# Patient Record
Sex: FEMALE | Race: WHITE | Employment: OTHER | URBAN - METROPOLITAN AREA
[De-identification: names, ages, dates, MRNs, and addresses within clinical notes are randomized per-mention and may not be internally consistent; named-entity substitution may affect disease eponyms.]

---

## 2018-04-13 RX ORDER — ERGOCALCIFEROL (VITAMIN D2) 1250 MCG
50000 CAPSULE ORAL WEEKLY
Qty: 12 CAPSULE | Refills: 3 | Status: SHIPPED | OUTPATIENT
Start: 2018-04-13 | End: 2019-03-15 | Stop reason: SDUPTHER

## 2018-04-30 ENCOUNTER — TELEPHONE (OUTPATIENT)
Dept: ADMINISTRATIVE | Age: 68
End: 2018-04-30

## 2018-05-09 ENCOUNTER — OFFICE VISIT (OUTPATIENT)
Dept: INTERNAL MEDICINE CLINIC | Age: 68
End: 2018-05-09

## 2018-05-09 VITALS
SYSTOLIC BLOOD PRESSURE: 132 MMHG | DIASTOLIC BLOOD PRESSURE: 80 MMHG | RESPIRATION RATE: 14 BRPM | BODY MASS INDEX: 32.02 KG/M2 | HEIGHT: 65 IN | WEIGHT: 192.2 LBS | HEART RATE: 78 BPM

## 2018-05-09 DIAGNOSIS — G89.29 CHRONIC PAIN OF BOTH KNEES: ICD-10-CM

## 2018-05-09 DIAGNOSIS — E55.9 VITAMIN D DEFICIENCY: ICD-10-CM

## 2018-05-09 DIAGNOSIS — F32.A ANXIETY AND DEPRESSION: Primary | ICD-10-CM

## 2018-05-09 DIAGNOSIS — R73.03 PRE-DIABETES: ICD-10-CM

## 2018-05-09 DIAGNOSIS — F41.9 ANXIETY AND DEPRESSION: Primary | ICD-10-CM

## 2018-05-09 DIAGNOSIS — G47.33 OSA (OBSTRUCTIVE SLEEP APNEA): ICD-10-CM

## 2018-05-09 DIAGNOSIS — M25.562 CHRONIC PAIN OF BOTH KNEES: ICD-10-CM

## 2018-05-09 DIAGNOSIS — M79.10 MUSCLE PAIN: ICD-10-CM

## 2018-05-09 DIAGNOSIS — I10 ESSENTIAL HYPERTENSION: ICD-10-CM

## 2018-05-09 DIAGNOSIS — M25.561 CHRONIC PAIN OF BOTH KNEES: ICD-10-CM

## 2018-05-09 DIAGNOSIS — E78.5 HYPERLIPIDEMIA LDL GOAL <100: ICD-10-CM

## 2018-05-09 DIAGNOSIS — M51.36 DDD (DEGENERATIVE DISC DISEASE), LUMBAR: ICD-10-CM

## 2018-05-09 PROBLEM — M51.369 DDD (DEGENERATIVE DISC DISEASE), LUMBAR: Status: ACTIVE | Noted: 2018-05-09

## 2018-05-09 PROCEDURE — G8417 CALC BMI ABV UP PARAM F/U: HCPCS | Performed by: INTERNAL MEDICINE

## 2018-05-09 PROCEDURE — 1123F ACP DISCUSS/DSCN MKR DOCD: CPT | Performed by: INTERNAL MEDICINE

## 2018-05-09 PROCEDURE — 99215 OFFICE O/P EST HI 40 MIN: CPT | Performed by: INTERNAL MEDICINE

## 2018-05-09 PROCEDURE — 1090F PRES/ABSN URINE INCON ASSESS: CPT | Performed by: INTERNAL MEDICINE

## 2018-05-09 PROCEDURE — G8400 PT W/DXA NO RESULTS DOC: HCPCS | Performed by: INTERNAL MEDICINE

## 2018-05-09 PROCEDURE — 1036F TOBACCO NON-USER: CPT | Performed by: INTERNAL MEDICINE

## 2018-05-09 PROCEDURE — 4040F PNEUMOC VAC/ADMIN/RCVD: CPT | Performed by: INTERNAL MEDICINE

## 2018-05-09 PROCEDURE — G8428 CUR MEDS NOT DOCUMENT: HCPCS | Performed by: INTERNAL MEDICINE

## 2018-05-09 PROCEDURE — 3017F COLORECTAL CA SCREEN DOC REV: CPT | Performed by: INTERNAL MEDICINE

## 2018-05-09 RX ORDER — TELMISARTAN 80 MG/1
80 TABLET ORAL DAILY
COMMUNITY
End: 2018-08-15 | Stop reason: SDUPTHER

## 2018-05-09 RX ORDER — AMLODIPINE BESYLATE 10 MG/1
10 TABLET ORAL DAILY
Qty: 30 TABLET | Refills: 3 | Status: SHIPPED | OUTPATIENT
Start: 2018-05-09 | End: 2018-05-09 | Stop reason: SDUPTHER

## 2018-05-09 RX ORDER — AMLODIPINE BESYLATE 10 MG/1
10 TABLET ORAL DAILY
Qty: 90 TABLET | Refills: 0 | Status: SHIPPED | OUTPATIENT
Start: 2018-05-09 | End: 2018-12-10 | Stop reason: SDUPTHER

## 2018-05-09 RX ORDER — ESCITALOPRAM OXALATE 20 MG/1
20 TABLET ORAL DAILY
Qty: 90 TABLET | Refills: 2 | Status: SHIPPED | OUTPATIENT
Start: 2018-05-09 | End: 2018-12-04 | Stop reason: ALTCHOICE

## 2018-05-09 RX ORDER — AMLODIPINE BESYLATE AND ATORVASTATIN CALCIUM 10; 20 MG/1; MG/1
1 TABLET, FILM COATED ORAL DAILY
COMMUNITY
End: 2018-05-09 | Stop reason: ALTCHOICE

## 2018-05-09 RX ORDER — LANOLIN ALCOHOL/MO/W.PET/CERES
500 CREAM (GRAM) TOPICAL NIGHTLY
COMMUNITY

## 2018-05-09 RX ORDER — METHOCARBAMOL 750 MG/1
750 TABLET, FILM COATED ORAL 2 TIMES DAILY PRN
Qty: 30 TABLET | Refills: 0 | Status: SHIPPED | OUTPATIENT
Start: 2018-05-09 | End: 2018-05-19

## 2018-05-09 RX ORDER — NICOTINE POLACRILEX 4 MG/1
40 GUM, CHEWING ORAL DAILY
COMMUNITY
End: 2018-07-06 | Stop reason: SDUPTHER

## 2018-05-09 RX ORDER — ESCITALOPRAM OXALATE 20 MG/1
20 TABLET ORAL DAILY
COMMUNITY
End: 2018-05-09 | Stop reason: SDUPTHER

## 2018-05-09 ASSESSMENT — PATIENT HEALTH QUESTIONNAIRE - PHQ9
2. FEELING DOWN, DEPRESSED OR HOPELESS: 1
SUM OF ALL RESPONSES TO PHQ9 QUESTIONS 1 & 2: 2
SUM OF ALL RESPONSES TO PHQ QUESTIONS 1-9: 2
1. LITTLE INTEREST OR PLEASURE IN DOING THINGS: 1

## 2018-05-10 PROBLEM — M79.10 MUSCLE PAIN: Status: ACTIVE | Noted: 2018-05-10

## 2018-05-10 PROBLEM — G89.29 CHRONIC PAIN OF BOTH KNEES: Status: ACTIVE | Noted: 2018-05-10

## 2018-05-10 PROBLEM — M25.562 CHRONIC PAIN OF BOTH KNEES: Status: ACTIVE | Noted: 2018-05-10

## 2018-05-10 PROBLEM — M25.561 CHRONIC PAIN OF BOTH KNEES: Status: ACTIVE | Noted: 2018-05-10

## 2018-05-10 ASSESSMENT — ENCOUNTER SYMPTOMS
BACK PAIN: 1
CONSTIPATION: 0
CHEST TIGHTNESS: 0
DIARRHEA: 0
SHORTNESS OF BREATH: 0

## 2018-06-22 ENCOUNTER — TELEPHONE (OUTPATIENT)
Dept: RHEUMATOLOGY | Age: 68
End: 2018-06-22

## 2018-06-22 DIAGNOSIS — M25.561 CHRONIC PAIN OF RIGHT KNEE: Primary | ICD-10-CM

## 2018-06-22 DIAGNOSIS — G89.29 CHRONIC PAIN OF RIGHT KNEE: Primary | ICD-10-CM

## 2018-07-06 ENCOUNTER — OFFICE VISIT (OUTPATIENT)
Dept: INTERNAL MEDICINE CLINIC | Age: 68
End: 2018-07-06

## 2018-07-06 VITALS
HEART RATE: 68 BPM | BODY MASS INDEX: 32.38 KG/M2 | WEIGHT: 194.6 LBS | SYSTOLIC BLOOD PRESSURE: 134 MMHG | RESPIRATION RATE: 14 BRPM | DIASTOLIC BLOOD PRESSURE: 72 MMHG

## 2018-07-06 DIAGNOSIS — G47.33 OSA (OBSTRUCTIVE SLEEP APNEA): ICD-10-CM

## 2018-07-06 DIAGNOSIS — G89.29 CHRONIC PAIN OF BOTH KNEES: Primary | ICD-10-CM

## 2018-07-06 DIAGNOSIS — M25.561 CHRONIC PAIN OF BOTH KNEES: Primary | ICD-10-CM

## 2018-07-06 DIAGNOSIS — E78.5 HYPERLIPIDEMIA LDL GOAL <100: ICD-10-CM

## 2018-07-06 DIAGNOSIS — E55.9 VITAMIN D DEFICIENCY: ICD-10-CM

## 2018-07-06 DIAGNOSIS — R73.03 PRE-DIABETES: ICD-10-CM

## 2018-07-06 DIAGNOSIS — I10 ESSENTIAL HYPERTENSION: ICD-10-CM

## 2018-07-06 DIAGNOSIS — K21.9 GASTROESOPHAGEAL REFLUX DISEASE WITHOUT ESOPHAGITIS: ICD-10-CM

## 2018-07-06 DIAGNOSIS — M25.562 CHRONIC PAIN OF BOTH KNEES: Primary | ICD-10-CM

## 2018-07-06 PROCEDURE — 1036F TOBACCO NON-USER: CPT | Performed by: INTERNAL MEDICINE

## 2018-07-06 PROCEDURE — G8427 DOCREV CUR MEDS BY ELIG CLIN: HCPCS | Performed by: INTERNAL MEDICINE

## 2018-07-06 PROCEDURE — G8417 CALC BMI ABV UP PARAM F/U: HCPCS | Performed by: INTERNAL MEDICINE

## 2018-07-06 PROCEDURE — 4040F PNEUMOC VAC/ADMIN/RCVD: CPT | Performed by: INTERNAL MEDICINE

## 2018-07-06 PROCEDURE — 3017F COLORECTAL CA SCREEN DOC REV: CPT | Performed by: INTERNAL MEDICINE

## 2018-07-06 PROCEDURE — G8400 PT W/DXA NO RESULTS DOC: HCPCS | Performed by: INTERNAL MEDICINE

## 2018-07-06 PROCEDURE — 1090F PRES/ABSN URINE INCON ASSESS: CPT | Performed by: INTERNAL MEDICINE

## 2018-07-06 PROCEDURE — 1123F ACP DISCUSS/DSCN MKR DOCD: CPT | Performed by: INTERNAL MEDICINE

## 2018-07-06 PROCEDURE — 99214 OFFICE O/P EST MOD 30 MIN: CPT | Performed by: INTERNAL MEDICINE

## 2018-07-06 RX ORDER — NICOTINE POLACRILEX 4 MG/1
20 GUM, CHEWING ORAL DAILY
Qty: 90 TABLET | Refills: 3 | Status: SHIPPED | OUTPATIENT
Start: 2018-07-06 | End: 2019-06-14 | Stop reason: SDUPTHER

## 2018-07-06 ASSESSMENT — ENCOUNTER SYMPTOMS
DIARRHEA: 0
CHEST TIGHTNESS: 0
BACK PAIN: 1
CONSTIPATION: 0
SHORTNESS OF BREATH: 0

## 2018-07-06 NOTE — PROGRESS NOTES
Patient: Jose Padron is a 76 y.o. female who presents today with the following Chief Complaint(s):  Chief Complaint   Patient presents with    Follow-up     2 month f/u    Results     labs and xray       Here today to for follow up:  Labs done and reviewed; cre 1.2, fasting glucose 100 , HbA1c was 5.8%, Vit D 58.7, TSH 1.180, Total Cholesterol  199, , HDL 44,     Saw back surgeon recently d/t increased back pain and not responding to epidural injections, did not really feel that she would benefit from another surgery. Recommendation to was to get a \"good brace\" which has helped a little. Is wondering if she should get a second opinion. Does not want surgery. Does not want pain medication. Did not try Robaxin. Did have an x-ray of her knee done. Done at the end of June at [facility]. Is going to try Hemp [place]v on her knee. Feeling well. HTN- bp usually runs in 130's/70's. No lightheadedness  CANDELARIA-does wear her CPAP. Does wake up with a headache. Does see her sleep specialist about q 6 months. Pre-diabetes- HbA1C 5.8%. No low sugars; no high sugars. CKD 3- creatinine 1.2, no NSAID's  GERD- controlled with omperazole 40 mg po qd.          Allergies   Allergen Reactions    Compazine [Prochlorperazine Maleate] Shortness Of Breath     dyspnea    Morphine Nausea And Vomiting     N&V      Past Medical History:   Diagnosis Date    Back pain, chronic     Depression     Joint pain     Sleep apnea       Past Surgical History:   Procedure Laterality Date    LUMBAR FUSION  2016    PARTIAL HYSTERECTOMY  2002      Social History     Social History    Marital status:      Spouse name: N/A    Number of children: N/A    Years of education: N/A     Occupational History    retired      Social History Main Topics    Smoking status: Never Smoker    Smokeless tobacco: Never Used    Alcohol use No    Drug use: No    Sexual activity: Not on file     Other Topics Concern    Not on file Social History Narrative    No narrative on file     Family History   Problem Relation Age of Onset    Heart Failure Mother     COPD Father         Outpatient Medications Prior to Visit   Medication Sig Dispense Refill    escitalopram (LEXAPRO) 20 MG tablet Take 1 tablet by mouth daily 90 tablet 2    telmisartan (MICARDIS) 80 MG tablet Take 80 mg by mouth daily      niacin 500 MG extended release capsule Take 500 mg by mouth nightly      Magnesium Hydroxide (MAGNESIA PO) Take by mouth      CPAP Machine MISC by Does not apply route      amLODIPine (NORVASC) 10 MG tablet Take 1 tablet by mouth daily 90 tablet 0    ergocalciferol (ERGOCALCIFEROL) 05641 units capsule Take 1 capsule by mouth once a week 12 capsule 3    omeprazole 20 MG EC tablet Take 40 mg by mouth daily       No facility-administered medications prior to visit. Patient's past medical history, surgical history, family history, medications,  and allergies  were all reviewed and updated as appropriate today. Review of Systems   Constitutional: Negative for appetite change, fatigue and fever. Respiratory: Negative for chest tightness and shortness of breath. Cardiovascular: Negative for chest pain. Gastrointestinal: Negative for constipation and diarrhea. Musculoskeletal: Positive for back pain, gait problem (related to back pain) and joint swelling. Skin: Negative for rash. Psychiatric/Behavioral: Negative for dysphoric mood. The patient is not nervous/anxious. /72 (Site: Left Arm, Position: Sitting)   Pulse 68   Resp 14   Wt 194 lb 9.6 oz (88.3 kg)   BMI 32.38 kg/m²   Physical Exam   Constitutional: She is oriented to person, place, and time. She appears well-developed and well-nourished. She is cooperative. She does not appear ill. No distress. HENT:   Head: Normocephalic.    Right Ear: Tympanic membrane, external ear and ear canal normal.   Left Ear: Tympanic membrane, external ear and ear canal normal.   Nose: Nose normal. No mucosal edema or rhinorrhea. Mouth/Throat: Oropharynx is clear and moist and mucous membranes are normal.   Eyes: Conjunctivae and EOM are normal. Pupils are equal, round, and reactive to light. Right eye exhibits no discharge. Left eye exhibits no discharge. No scleral icterus. Neck: Normal range of motion. Neck supple. Carotid bruit is not present. No thyroid mass and no thyromegaly present. Cardiovascular: Normal rate, regular rhythm, normal heart sounds and intact distal pulses. No murmur heard. Trace edema b/l ankles   Pulmonary/Chest: Effort normal. She has no wheezes. She has no rales. She exhibits no tenderness. Abdominal: Soft. Bowel sounds are normal. She exhibits no mass. There is no tenderness. Musculoskeletal: She exhibits no edema. Right knee: She exhibits normal range of motion and no swelling. Tenderness (generalized) found. Left knee: She exhibits decreased range of motion. She exhibits no swelling and no effusion. Tenderness (generalized) found. Lumbar back: She exhibits decreased range of motion, tenderness, pain and spasm. She exhibits no swelling. Increased muscle tone b/l paraspinals. Has several tender points in upper arms and upper back. Lymphadenopathy:     She has no cervical adenopathy. Neurological: She is alert and oriented to person, place, and time. Skin: Skin is warm and dry. No pallor. Psychiatric: She has a normal mood and affect. Her behavior is normal. Judgment and thought content normal.       ASSESSMENT/PLAN:    Problem List Items Addressed This Visit     Chronic pain of both knees - Primary     Had x-rays done at [facility] last week. No results available. Suspect arthritis. Is going to try [employer]. Would be willing to see ortho (Dr. Von Ozuna) if does not work. Essential hypertension     Well controlled. Continue Micardis 80 mg po qd and amlodipine 10 mg po qd.           Gastroesophageal reflux

## 2018-07-07 NOTE — ASSESSMENT & PLAN NOTE
Borderline. May benefit from statin. Will see what her labs look like in 6 months. Would try Crestor secondary to fewer side effects. Lipitor was discontinued in the spring to see if that helped her pain.

## 2018-07-07 NOTE — ASSESSMENT & PLAN NOTE
Continues to be compliant with CPAP. Does wake up with headaches frequently. Advised to discuss with her sleep specialist and make sure that her settings do not need to be adjusted.

## 2018-08-15 ENCOUNTER — TELEPHONE (OUTPATIENT)
Dept: INTERNAL MEDICINE CLINIC | Age: 68
End: 2018-08-15

## 2018-08-15 DIAGNOSIS — I10 ESSENTIAL HYPERTENSION: Primary | ICD-10-CM

## 2018-08-15 RX ORDER — METOPROLOL SUCCINATE 100 MG/1
100 TABLET, EXTENDED RELEASE ORAL DAILY
Qty: 90 TABLET | Refills: 3 | Status: SHIPPED | OUTPATIENT
Start: 2018-08-15 | End: 2018-12-10 | Stop reason: SDUPTHER

## 2018-08-15 RX ORDER — METOPROLOL SUCCINATE 100 MG/1
100 TABLET, EXTENDED RELEASE ORAL 2 TIMES DAILY
COMMUNITY
End: 2018-08-15 | Stop reason: SDUPTHER

## 2018-08-15 RX ORDER — TELMISARTAN 80 MG/1
80 TABLET ORAL DAILY
Qty: 90 TABLET | Refills: 3 | Status: SHIPPED | OUTPATIENT
Start: 2018-08-15 | End: 2019-07-28 | Stop reason: SDUPTHER

## 2018-11-07 ENCOUNTER — OFFICE VISIT (OUTPATIENT)
Dept: INTERNAL MEDICINE CLINIC | Age: 68
End: 2018-11-07
Payer: MEDICARE

## 2018-11-07 VITALS
BODY MASS INDEX: 32.15 KG/M2 | HEART RATE: 64 BPM | OXYGEN SATURATION: 96 % | TEMPERATURE: 97.6 F | SYSTOLIC BLOOD PRESSURE: 138 MMHG | DIASTOLIC BLOOD PRESSURE: 80 MMHG | WEIGHT: 193.2 LBS

## 2018-11-07 DIAGNOSIS — R51.9 LEFT TEMPORAL HEADACHE: ICD-10-CM

## 2018-11-07 DIAGNOSIS — F32.A ANXIETY AND DEPRESSION: ICD-10-CM

## 2018-11-07 DIAGNOSIS — F41.9 ANXIETY AND DEPRESSION: ICD-10-CM

## 2018-11-07 DIAGNOSIS — R53.83 OTHER FATIGUE: ICD-10-CM

## 2018-11-07 DIAGNOSIS — R06.09 DYSPNEA ON EXERTION: ICD-10-CM

## 2018-11-07 DIAGNOSIS — R06.02 SHORTNESS OF BREATH: Primary | ICD-10-CM

## 2018-11-07 DIAGNOSIS — Z23 NEED FOR INFLUENZA VACCINATION: ICD-10-CM

## 2018-11-07 DIAGNOSIS — R06.02 SHORTNESS OF BREATH: ICD-10-CM

## 2018-11-07 DIAGNOSIS — M51.36 DDD (DEGENERATIVE DISC DISEASE), LUMBAR: ICD-10-CM

## 2018-11-07 DIAGNOSIS — F41.9 ANXIETY AND DEPRESSION: Primary | ICD-10-CM

## 2018-11-07 DIAGNOSIS — F32.A ANXIETY AND DEPRESSION: Primary | ICD-10-CM

## 2018-11-07 LAB
BASOPHILS ABSOLUTE: 0.1 K/UL (ref 0–0.2)
BASOPHILS RELATIVE PERCENT: 0.5 %
EOSINOPHILS ABSOLUTE: 0.3 K/UL (ref 0–0.6)
EOSINOPHILS RELATIVE PERCENT: 2.3 %
HCT VFR BLD CALC: 39.9 % (ref 36–48)
HEMOGLOBIN: 13.1 G/DL (ref 12–16)
LYMPHOCYTES ABSOLUTE: 2.9 K/UL (ref 1–5.1)
LYMPHOCYTES RELATIVE PERCENT: 23.7 %
MCH RBC QN AUTO: 28.3 PG (ref 26–34)
MCHC RBC AUTO-ENTMCNC: 33 G/DL (ref 31–36)
MCV RBC AUTO: 85.7 FL (ref 80–100)
MONOCYTES ABSOLUTE: 1.2 K/UL (ref 0–1.3)
MONOCYTES RELATIVE PERCENT: 9.7 %
NEUTROPHILS ABSOLUTE: 7.7 K/UL (ref 1.7–7.7)
NEUTROPHILS RELATIVE PERCENT: 63.8 %
PDW BLD-RTO: 14.2 % (ref 12.4–15.4)
PLATELET # BLD: 339 K/UL (ref 135–450)
PMV BLD AUTO: 8.4 FL (ref 5–10.5)
RBC # BLD: 4.65 M/UL (ref 4–5.2)
SEDIMENTATION RATE, ERYTHROCYTE: 34 MM/HR (ref 0–30)
WBC # BLD: 12.1 K/UL (ref 4–11)

## 2018-11-07 PROCEDURE — G8484 FLU IMMUNIZE NO ADMIN: HCPCS | Performed by: INTERNAL MEDICINE

## 2018-11-07 PROCEDURE — G8400 PT W/DXA NO RESULTS DOC: HCPCS | Performed by: INTERNAL MEDICINE

## 2018-11-07 PROCEDURE — 93000 ELECTROCARDIOGRAM COMPLETE: CPT | Performed by: INTERNAL MEDICINE

## 2018-11-07 PROCEDURE — 1101F PT FALLS ASSESS-DOCD LE1/YR: CPT | Performed by: INTERNAL MEDICINE

## 2018-11-07 PROCEDURE — G8427 DOCREV CUR MEDS BY ELIG CLIN: HCPCS | Performed by: INTERNAL MEDICINE

## 2018-11-07 PROCEDURE — 4040F PNEUMOC VAC/ADMIN/RCVD: CPT | Performed by: INTERNAL MEDICINE

## 2018-11-07 PROCEDURE — 1090F PRES/ABSN URINE INCON ASSESS: CPT | Performed by: INTERNAL MEDICINE

## 2018-11-07 PROCEDURE — 1123F ACP DISCUSS/DSCN MKR DOCD: CPT | Performed by: INTERNAL MEDICINE

## 2018-11-07 PROCEDURE — 1036F TOBACCO NON-USER: CPT | Performed by: INTERNAL MEDICINE

## 2018-11-07 PROCEDURE — G8417 CALC BMI ABV UP PARAM F/U: HCPCS | Performed by: INTERNAL MEDICINE

## 2018-11-07 PROCEDURE — 99214 OFFICE O/P EST MOD 30 MIN: CPT | Performed by: INTERNAL MEDICINE

## 2018-11-07 PROCEDURE — 3017F COLORECTAL CA SCREEN DOC REV: CPT | Performed by: INTERNAL MEDICINE

## 2018-11-07 RX ORDER — DULOXETIN HYDROCHLORIDE 30 MG/1
30 CAPSULE, DELAYED RELEASE ORAL DAILY
Qty: 30 CAPSULE | Refills: 0 | Status: SHIPPED | OUTPATIENT
Start: 2018-11-07 | End: 2018-12-04 | Stop reason: SDUPTHER

## 2018-11-07 RX ORDER — DULOXETIN HYDROCHLORIDE 30 MG/1
30 CAPSULE, DELAYED RELEASE ORAL DAILY
Qty: 30 CAPSULE | Refills: 0 | Status: SHIPPED | OUTPATIENT
Start: 2018-11-07 | End: 2018-11-07 | Stop reason: SDUPTHER

## 2018-11-07 ASSESSMENT — ENCOUNTER SYMPTOMS
SHORTNESS OF BREATH: 1
CONSTIPATION: 0
CHEST TIGHTNESS: 0
DIARRHEA: 0

## 2018-11-07 NOTE — PATIENT INSTRUCTIONS
Decrease Lexapro to 1/2 pill daily for 1 week then stop. In 1 week start Cymbalta 30 mg daily. This dose may need to be increased.

## 2018-11-07 NOTE — PROGRESS NOTES
light. Conjunctivae and EOM are normal. Right eye exhibits no discharge. Left eye exhibits no discharge. No scleral icterus. Neck: Normal range of motion. Neck supple. Carotid bruit is not present. No thyroid mass and no thyromegaly present. Cardiovascular: Normal rate, regular rhythm, normal heart sounds and intact distal pulses. No murmur heard. Pulses:       Dorsalis pedis pulses are 2+ on the right side, and 2+ on the left side. No LE edema   Pulmonary/Chest: Effort normal. She has no wheezes. She has no rales. She exhibits no tenderness. Lymphadenopathy:     She has no cervical adenopathy. Neurological: She is alert and oriented to person, place, and time. Skin: Skin is warm and dry. No pallor. Psychiatric: She has a normal mood and affect. Her behavior is normal. Judgment and thought content normal.       ASSESSMENT/PLAN:    Problem List Items Addressed This Visit     Anxiety and depression - Primary     Has done very well on Lexapro for many years now but no longer seems to be working for her. Will try changing Lexapro to Cymbalta (after Thanksgiving) as Cymbalta may also help with her pain. Decrease Lexapro to 10 mg qd x 1 week then stop and start Cymbalta 30 mg po qd. DDD (degenerative disc disease), lumbar     Pain is interfering the her quality of life. Does not want to take pain medication d/t her son's history of narcotics addiction. Is no longer able to take NSAID's d/t CKD. Discussed referral to pain management for possible procedures/spine stimulator to see if that will help with her low back pain. She is followed by [facility]. Advised to make appointment with them to see if she may benefit from spinal cord stimulator. Dyspnea on exertion     Likely related to deconditioning but does have risk factors for CAD (HTN, Hyperlipidemia, pre-diabetes- all controlled).  EKG in office today is normal. Check stress test.          Relevant Orders    CBC Auto Differential

## 2018-12-04 ENCOUNTER — OFFICE VISIT (OUTPATIENT)
Dept: INTERNAL MEDICINE CLINIC | Age: 68
End: 2018-12-04
Payer: MEDICARE

## 2018-12-04 VITALS
RESPIRATION RATE: 14 BRPM | BODY MASS INDEX: 32.48 KG/M2 | SYSTOLIC BLOOD PRESSURE: 128 MMHG | HEART RATE: 64 BPM | DIASTOLIC BLOOD PRESSURE: 68 MMHG | WEIGHT: 195.2 LBS

## 2018-12-04 DIAGNOSIS — F32.A ANXIETY AND DEPRESSION: Primary | ICD-10-CM

## 2018-12-04 DIAGNOSIS — M51.36 DDD (DEGENERATIVE DISC DISEASE), LUMBAR: ICD-10-CM

## 2018-12-04 DIAGNOSIS — F41.9 ANXIETY AND DEPRESSION: Primary | ICD-10-CM

## 2018-12-04 DIAGNOSIS — R51.9 LEFT TEMPORAL HEADACHE: ICD-10-CM

## 2018-12-04 DIAGNOSIS — R41.3 MEMORY LOSS OR IMPAIRMENT: ICD-10-CM

## 2018-12-04 PROCEDURE — 3017F COLORECTAL CA SCREEN DOC REV: CPT | Performed by: INTERNAL MEDICINE

## 2018-12-04 PROCEDURE — G8484 FLU IMMUNIZE NO ADMIN: HCPCS | Performed by: INTERNAL MEDICINE

## 2018-12-04 PROCEDURE — 99214 OFFICE O/P EST MOD 30 MIN: CPT | Performed by: INTERNAL MEDICINE

## 2018-12-04 PROCEDURE — 1123F ACP DISCUSS/DSCN MKR DOCD: CPT | Performed by: INTERNAL MEDICINE

## 2018-12-04 PROCEDURE — 4040F PNEUMOC VAC/ADMIN/RCVD: CPT | Performed by: INTERNAL MEDICINE

## 2018-12-04 PROCEDURE — 1101F PT FALLS ASSESS-DOCD LE1/YR: CPT | Performed by: INTERNAL MEDICINE

## 2018-12-04 PROCEDURE — G8427 DOCREV CUR MEDS BY ELIG CLIN: HCPCS | Performed by: INTERNAL MEDICINE

## 2018-12-04 PROCEDURE — G8417 CALC BMI ABV UP PARAM F/U: HCPCS | Performed by: INTERNAL MEDICINE

## 2018-12-04 PROCEDURE — 1090F PRES/ABSN URINE INCON ASSESS: CPT | Performed by: INTERNAL MEDICINE

## 2018-12-04 PROCEDURE — G8400 PT W/DXA NO RESULTS DOC: HCPCS | Performed by: INTERNAL MEDICINE

## 2018-12-04 PROCEDURE — 1036F TOBACCO NON-USER: CPT | Performed by: INTERNAL MEDICINE

## 2018-12-04 NOTE — PROGRESS NOTES
Patient: Macy Shaw is a 76 y.o. female who presents today with the following Chief Complaint(s):  Chief Complaint   Patient presents with    1 Month Follow-Up       Here today for follow up on Cymbalta. Feels well on at this dose. Does not feel like it needs to be increased. Has not really helped with pain. Depression is much better. Is sweating more since starting Cymbalta. Is planning on seeing pain management after the first of the year. Did try CBD oil, did not help her. Has been advised to see someone else and try to dose it differently. Has also been taking Tylenol Arthritis and ibuprofen 600 mg. Will set up her stress test soon as well. Is very worried about her memory- notices that she has difficulty with word finding. Put something in the microwave that she should not have. Couldn't remember what went on the table when her  removed a plant to water it. Noticed if over the past few months. Is compliant with CPAP and has been checked recently. Does not feel fatigued. No family h/o dementia. Good long term memory. Headaches have improved. Has only had a few episodes since her last appointment. 25 minutes were spent with patient today in face to face encounter, more than 50% of it was counseling regarding depression/memory loss/pain options .            Allergies   Allergen Reactions    Compazine [Prochlorperazine Maleate] Shortness Of Breath     dyspnea    Morphine Nausea And Vomiting     N&V      Past Medical History:   Diagnosis Date    Back pain, chronic     Depression     Joint pain     Sleep apnea       Past Surgical History:   Procedure Laterality Date    LUMBAR FUSION  2016    PARTIAL HYSTERECTOMY  2002      Social History     Social History    Marital status:      Spouse name: N/A    Number of children: N/A    Years of education: N/A     Occupational History    retired      Social History Main Topics    Smoking status: Never Smoker    Smokeless tobacco: Never Used    Alcohol use No    Drug use: No    Sexual activity: Not on file     Other Topics Concern    Not on file     Social History Narrative    No narrative on file     Family History   Problem Relation Age of Onset    Heart Failure Mother     COPD Father         Outpatient Medications Prior to Visit   Medication Sig Dispense Refill    DULoxetine (CYMBALTA) 30 MG extended release capsule Take 1 capsule by mouth daily 30 capsule 0    telmisartan (MICARDIS) 80 MG tablet Take 1 tablet by mouth daily 90 tablet 3    metoprolol succinate (TOPROL XL) 100 MG extended release tablet Take 1 tablet by mouth daily 90 tablet 3    omeprazole 20 MG EC tablet Take 1 tablet by mouth daily 90 tablet 3    niacin 500 MG extended release capsule Take 500 mg by mouth nightly      Magnesium Hydroxide (MAGNESIA PO) Take by mouth      CPAP Machine MISC by Does not apply route      amLODIPine (NORVASC) 10 MG tablet Take 1 tablet by mouth daily 90 tablet 0    ergocalciferol (ERGOCALCIFEROL) 93300 units capsule Take 1 capsule by mouth once a week 12 capsule 3    escitalopram (LEXAPRO) 20 MG tablet Take 1 tablet by mouth daily 90 tablet 2     No facility-administered medications prior to visit. Patient'spast medical history, surgical history, family history, medications,  and allergies  were all reviewed and updated as appropriate today. Review of Systems   Constitutional: Negative for appetite change, fatigue and fever. Respiratory: Positive for shortness of breath. Negative for chest tightness. Cardiovascular: Negative for chest pain. Gastrointestinal: Negative for constipation and diarrhea. Skin: Negative for rash. /68 (Site: Left Upper Arm)   Pulse 64   Resp 14   Wt 195 lb 3.2 oz (88.5 kg)   BMI 32.48 kg/m²   Physical Exam   Constitutional: She is oriented to person, place, and time. She appears well-developed and well-nourished. She is cooperative. She does not appear ill.

## 2018-12-10 ENCOUNTER — HOSPITAL ENCOUNTER (OUTPATIENT)
Dept: CT IMAGING | Age: 68
Discharge: HOME | End: 2018-12-10
Payer: MEDICARE

## 2018-12-10 ENCOUNTER — TELEPHONE (OUTPATIENT)
Dept: INTERNAL MEDICINE CLINIC | Age: 68
End: 2018-12-10

## 2018-12-10 DIAGNOSIS — R51.9 LEFT TEMPORAL HEADACHE: ICD-10-CM

## 2018-12-10 DIAGNOSIS — I10 ESSENTIAL HYPERTENSION: ICD-10-CM

## 2018-12-10 DIAGNOSIS — R41.3 MEMORY LOSS OR IMPAIRMENT: ICD-10-CM

## 2018-12-10 PROCEDURE — 70450 CT HEAD/BRAIN W/O DYE: CPT

## 2018-12-10 RX ORDER — AMLODIPINE BESYLATE 10 MG/1
10 TABLET ORAL DAILY
Qty: 90 TABLET | Refills: 3 | Status: SHIPPED | OUTPATIENT
Start: 2018-12-10 | End: 2019-11-13 | Stop reason: SDUPTHER

## 2018-12-10 RX ORDER — METOPROLOL SUCCINATE 100 MG/1
100 TABLET, EXTENDED RELEASE ORAL DAILY
Qty: 90 TABLET | Refills: 3 | Status: SHIPPED | OUTPATIENT
Start: 2018-12-10 | End: 2019-03-04 | Stop reason: SDUPTHER

## 2018-12-11 ASSESSMENT — ENCOUNTER SYMPTOMS
DIARRHEA: 0
CHEST TIGHTNESS: 0
SHORTNESS OF BREATH: 1
CONSTIPATION: 0

## 2018-12-27 DIAGNOSIS — F32.A ANXIETY AND DEPRESSION: ICD-10-CM

## 2018-12-27 DIAGNOSIS — F41.9 ANXIETY AND DEPRESSION: ICD-10-CM

## 2018-12-27 RX ORDER — DULOXETIN HYDROCHLORIDE 30 MG/1
CAPSULE, DELAYED RELEASE ORAL
Qty: 30 CAPSULE | Refills: 0 | Status: SHIPPED | OUTPATIENT
Start: 2018-12-27 | End: 2019-01-08 | Stop reason: SDUPTHER

## 2019-02-05 ENCOUNTER — OFFICE VISIT (OUTPATIENT)
Dept: INTERNAL MEDICINE CLINIC | Age: 69
End: 2019-02-05
Payer: MEDICARE

## 2019-02-05 VITALS
HEART RATE: 80 BPM | BODY MASS INDEX: 31.95 KG/M2 | WEIGHT: 192 LBS | RESPIRATION RATE: 12 BRPM | SYSTOLIC BLOOD PRESSURE: 142 MMHG | DIASTOLIC BLOOD PRESSURE: 82 MMHG

## 2019-02-05 DIAGNOSIS — F32.A ANXIETY AND DEPRESSION: Primary | ICD-10-CM

## 2019-02-05 DIAGNOSIS — F32.A DEPRESSIVE DISORDER: ICD-10-CM

## 2019-02-05 DIAGNOSIS — E55.9 VITAMIN D DEFICIENCY: ICD-10-CM

## 2019-02-05 DIAGNOSIS — M25.562 CHRONIC PAIN OF BOTH KNEES: ICD-10-CM

## 2019-02-05 DIAGNOSIS — F41.9 ANXIETY AND DEPRESSION: Primary | ICD-10-CM

## 2019-02-05 DIAGNOSIS — G47.33 OSA (OBSTRUCTIVE SLEEP APNEA): ICD-10-CM

## 2019-02-05 DIAGNOSIS — G89.29 CHRONIC PAIN OF BOTH KNEES: ICD-10-CM

## 2019-02-05 DIAGNOSIS — R73.03 PRE-DIABETES: ICD-10-CM

## 2019-02-05 DIAGNOSIS — M25.561 CHRONIC PAIN OF BOTH KNEES: ICD-10-CM

## 2019-02-05 DIAGNOSIS — M51.36 DDD (DEGENERATIVE DISC DISEASE), LUMBAR: ICD-10-CM

## 2019-02-05 DIAGNOSIS — E78.5 HYPERLIPIDEMIA LDL GOAL <100: ICD-10-CM

## 2019-02-05 DIAGNOSIS — R41.3 MEMORY LOSS OR IMPAIRMENT: ICD-10-CM

## 2019-02-05 DIAGNOSIS — I10 ESSENTIAL HYPERTENSION: ICD-10-CM

## 2019-02-05 DIAGNOSIS — Z78.0 MENOPAUSE: ICD-10-CM

## 2019-02-05 PROCEDURE — 1036F TOBACCO NON-USER: CPT | Performed by: INTERNAL MEDICINE

## 2019-02-05 PROCEDURE — 1123F ACP DISCUSS/DSCN MKR DOCD: CPT | Performed by: INTERNAL MEDICINE

## 2019-02-05 PROCEDURE — G8427 DOCREV CUR MEDS BY ELIG CLIN: HCPCS | Performed by: INTERNAL MEDICINE

## 2019-02-05 PROCEDURE — 1101F PT FALLS ASSESS-DOCD LE1/YR: CPT | Performed by: INTERNAL MEDICINE

## 2019-02-05 PROCEDURE — 99214 OFFICE O/P EST MOD 30 MIN: CPT | Performed by: INTERNAL MEDICINE

## 2019-02-05 PROCEDURE — G8417 CALC BMI ABV UP PARAM F/U: HCPCS | Performed by: INTERNAL MEDICINE

## 2019-02-05 PROCEDURE — 3017F COLORECTAL CA SCREEN DOC REV: CPT | Performed by: INTERNAL MEDICINE

## 2019-02-05 PROCEDURE — 4040F PNEUMOC VAC/ADMIN/RCVD: CPT | Performed by: INTERNAL MEDICINE

## 2019-02-05 PROCEDURE — G8484 FLU IMMUNIZE NO ADMIN: HCPCS | Performed by: INTERNAL MEDICINE

## 2019-02-05 PROCEDURE — 1090F PRES/ABSN URINE INCON ASSESS: CPT | Performed by: INTERNAL MEDICINE

## 2019-02-05 PROCEDURE — G8400 PT W/DXA NO RESULTS DOC: HCPCS | Performed by: INTERNAL MEDICINE

## 2019-02-05 ASSESSMENT — ENCOUNTER SYMPTOMS
CHEST TIGHTNESS: 0
DIARRHEA: 0
CONSTIPATION: 0
SHORTNESS OF BREATH: 0
BACK PAIN: 1

## 2019-02-14 DIAGNOSIS — E78.5 HYPERLIPIDEMIA LDL GOAL <100: Primary | ICD-10-CM

## 2019-02-14 DIAGNOSIS — E78.5 HYPERLIPIDEMIA LDL GOAL <100: ICD-10-CM

## 2019-02-14 DIAGNOSIS — I10 ESSENTIAL HYPERTENSION: ICD-10-CM

## 2019-02-14 DIAGNOSIS — R73.03 PRE-DIABETES: ICD-10-CM

## 2019-02-14 DIAGNOSIS — E55.9 VITAMIN D DEFICIENCY: ICD-10-CM

## 2019-02-14 LAB
A/G RATIO: 1.1 (ref 1.1–2.2)
ALBUMIN SERPL-MCNC: 4.1 G/DL (ref 3.4–5)
ALP BLD-CCNC: 72 U/L (ref 40–129)
ALT SERPL-CCNC: 12 U/L (ref 10–40)
ANION GAP SERPL CALCULATED.3IONS-SCNC: 17 MMOL/L (ref 3–16)
AST SERPL-CCNC: 17 U/L (ref 15–37)
BILIRUB SERPL-MCNC: 0.5 MG/DL (ref 0–1)
BUN BLDV-MCNC: 14 MG/DL (ref 7–20)
CALCIUM SERPL-MCNC: 9.7 MG/DL (ref 8.3–10.6)
CHLORIDE BLD-SCNC: 100 MMOL/L (ref 99–110)
CHOLESTEROL, TOTAL: 245 MG/DL (ref 0–199)
CO2: 20 MMOL/L (ref 21–32)
CREAT SERPL-MCNC: 0.8 MG/DL (ref 0.6–1.2)
ESTIMATED AVERAGE GLUCOSE: 114 MG/DL
GFR AFRICAN AMERICAN: >60
GFR NON-AFRICAN AMERICAN: >60
GLOBULIN: 3.6 G/DL
GLUCOSE BLD-MCNC: 111 MG/DL (ref 70–99)
HBA1C MFR BLD: 5.6 %
HDLC SERPL-MCNC: 40 MG/DL (ref 40–60)
LDL CHOLESTEROL CALCULATED: ABNORMAL MG/DL
LDL CHOLESTEROL DIRECT: 165 MG/DL
POTASSIUM SERPL-SCNC: 4 MMOL/L (ref 3.5–5.1)
SODIUM BLD-SCNC: 137 MMOL/L (ref 136–145)
TOTAL PROTEIN: 7.7 G/DL (ref 6.4–8.2)
TRIGL SERPL-MCNC: 353 MG/DL (ref 0–150)
VLDLC SERPL CALC-MCNC: ABNORMAL MG/DL

## 2019-02-14 RX ORDER — ROSUVASTATIN CALCIUM 10 MG/1
10 TABLET, COATED ORAL NIGHTLY
Qty: 90 TABLET | Refills: 1 | Status: SHIPPED | OUTPATIENT
Start: 2019-02-14 | End: 2019-07-28 | Stop reason: SDUPTHER

## 2019-03-01 DIAGNOSIS — F32.A ANXIETY AND DEPRESSION: ICD-10-CM

## 2019-03-01 DIAGNOSIS — F41.9 ANXIETY AND DEPRESSION: ICD-10-CM

## 2019-03-01 RX ORDER — DULOXETIN HYDROCHLORIDE 30 MG/1
CAPSULE, DELAYED RELEASE ORAL
Qty: 30 CAPSULE | Refills: 0 | Status: SHIPPED | OUTPATIENT
Start: 2019-03-01 | End: 2019-07-26 | Stop reason: SDUPTHER

## 2019-03-04 DIAGNOSIS — I10 ESSENTIAL HYPERTENSION: ICD-10-CM

## 2019-03-04 RX ORDER — METOPROLOL SUCCINATE 100 MG/1
100 TABLET, EXTENDED RELEASE ORAL DAILY
Qty: 30 TABLET | Refills: 0 | Status: SHIPPED | OUTPATIENT
Start: 2019-03-04 | End: 2019-04-01 | Stop reason: SDUPTHER

## 2019-03-15 RX ORDER — ERGOCALCIFEROL 1.25 MG/1
CAPSULE ORAL
Qty: 12 CAPSULE | Refills: 3 | Status: SHIPPED | OUTPATIENT
Start: 2019-03-15 | End: 2020-02-14

## 2019-03-19 ENCOUNTER — TELEPHONE (OUTPATIENT)
Dept: RHEUMATOLOGY | Age: 69
End: 2019-03-19

## 2019-04-01 DIAGNOSIS — I10 ESSENTIAL HYPERTENSION: ICD-10-CM

## 2019-04-01 RX ORDER — METOPROLOL SUCCINATE 100 MG/1
TABLET, EXTENDED RELEASE ORAL
Qty: 30 TABLET | Refills: 0 | Status: SHIPPED | OUTPATIENT
Start: 2019-04-01 | End: 2019-05-10 | Stop reason: SDUPTHER

## 2019-05-10 ENCOUNTER — TELEPHONE (OUTPATIENT)
Dept: INTERNAL MEDICINE CLINIC | Age: 69
End: 2019-05-10

## 2019-05-10 DIAGNOSIS — I10 ESSENTIAL HYPERTENSION: ICD-10-CM

## 2019-05-10 RX ORDER — METOPROLOL SUCCINATE 100 MG/1
TABLET, EXTENDED RELEASE ORAL
Qty: 10 TABLET | Refills: 0 | Status: SHIPPED | OUTPATIENT
Start: 2019-05-10 | End: 2019-06-21 | Stop reason: SDUPTHER

## 2019-05-10 RX ORDER — METOPROLOL SUCCINATE 100 MG/1
TABLET, EXTENDED RELEASE ORAL
Qty: 90 TABLET | Refills: 3 | Status: SHIPPED | OUTPATIENT
Start: 2019-05-10 | End: 2019-05-10 | Stop reason: SDUPTHER

## 2019-06-14 RX ORDER — OMEPRAZOLE 20 MG/1
CAPSULE, DELAYED RELEASE ORAL
Qty: 90 CAPSULE | Refills: 3 | Status: SHIPPED | OUTPATIENT
Start: 2019-06-14 | End: 2020-10-09 | Stop reason: SDUPTHER

## 2019-06-21 ENCOUNTER — TELEPHONE (OUTPATIENT)
Dept: INTERNAL MEDICINE CLINIC | Age: 69
End: 2019-06-21

## 2019-06-21 DIAGNOSIS — I10 ESSENTIAL HYPERTENSION: ICD-10-CM

## 2019-06-21 RX ORDER — METOPROLOL SUCCINATE 100 MG/1
200 TABLET, EXTENDED RELEASE ORAL DAILY
Qty: 180 TABLET | Refills: 3 | Status: SHIPPED | OUTPATIENT
Start: 2019-06-21 | End: 2019-06-27 | Stop reason: SDUPTHER

## 2019-06-21 NOTE — TELEPHONE ENCOUNTER
I have sent an updated rx to [facility] for 200 mg qd.  If she does not get a refill in time, I will send in a local supply as well!  saw

## 2019-06-21 NOTE — TELEPHONE ENCOUNTER
Pt has been taking 2 metoprolol succinate (TOPROL XL) 100 MG extended release tablet everyday b/c that is how her previous PCP prescribed them. She only has 18 pills left and [facility] will not send her anymore until July. She wants to clarify how she is supposed to take the med and what she should do regarding a refill.

## 2019-06-27 ENCOUNTER — TELEPHONE (OUTPATIENT)
Dept: INTERNAL MEDICINE CLINIC | Age: 69
End: 2019-06-27

## 2019-06-27 DIAGNOSIS — I10 ESSENTIAL HYPERTENSION: ICD-10-CM

## 2019-06-27 RX ORDER — METOPROLOL SUCCINATE 100 MG/1
200 TABLET, EXTENDED RELEASE ORAL DAILY
Qty: 180 TABLET | Refills: 3 | Status: SHIPPED | OUTPATIENT
Start: 2019-06-27 | End: 2019-06-28 | Stop reason: SDUPTHER

## 2019-06-27 NOTE — TELEPHONE ENCOUNTER
Pt calling about Metoprolol script---[facility] said they got it but it was pretty much blank---can you resend this script to them? Please let pt know when sent again. Thanks.

## 2019-06-28 ENCOUNTER — TELEPHONE (OUTPATIENT)
Dept: INTERNAL MEDICINE CLINIC | Age: 69
End: 2019-06-28

## 2019-06-28 DIAGNOSIS — I10 ESSENTIAL HYPERTENSION: ICD-10-CM

## 2019-06-28 RX ORDER — METOPROLOL SUCCINATE 100 MG/1
100 TABLET, EXTENDED RELEASE ORAL 2 TIMES DAILY
Qty: 180 TABLET | Refills: 3 | Status: SHIPPED | OUTPATIENT
Start: 2019-06-28 | End: 2020-05-28

## 2019-06-28 RX ORDER — METOPROLOL TARTRATE 100 MG/1
100 TABLET ORAL 2 TIMES DAILY
Qty: 20 TABLET | Refills: 0 | Status: SHIPPED | OUTPATIENT
Start: 2019-06-28 | End: 2019-07-08 | Stop reason: SDUPTHER

## 2019-06-28 NOTE — TELEPHONE ENCOUNTER
Script corrected and sent to [facility] and 10 day supply sent to [facility] in [place]. Left message for pt. Pharmacy will call pt when the script is ready.

## 2019-06-28 NOTE — TELEPHONE ENCOUNTER
Pt calling about the Metoprolol script---2 sets of directions on one line--needs fixed and resent to [facility]---won't get in time please send a 10 day supply to [facility] in [address] and let pt know when sent. Thanks.

## 2019-07-08 ENCOUNTER — OFFICE VISIT (OUTPATIENT)
Dept: INTERNAL MEDICINE CLINIC | Age: 69
End: 2019-07-08
Payer: MEDICARE

## 2019-07-08 VITALS
SYSTOLIC BLOOD PRESSURE: 118 MMHG | BODY MASS INDEX: 31.62 KG/M2 | HEART RATE: 60 BPM | HEIGHT: 65 IN | WEIGHT: 189.8 LBS | DIASTOLIC BLOOD PRESSURE: 72 MMHG

## 2019-07-08 DIAGNOSIS — M51.36 DDD (DEGENERATIVE DISC DISEASE), LUMBAR: ICD-10-CM

## 2019-07-08 DIAGNOSIS — G47.33 OSA (OBSTRUCTIVE SLEEP APNEA): ICD-10-CM

## 2019-07-08 DIAGNOSIS — I10 ESSENTIAL HYPERTENSION: ICD-10-CM

## 2019-07-08 DIAGNOSIS — R73.03 PRE-DIABETES: ICD-10-CM

## 2019-07-08 DIAGNOSIS — F41.9 ANXIETY AND DEPRESSION: ICD-10-CM

## 2019-07-08 DIAGNOSIS — E78.5 HYPERLIPIDEMIA LDL GOAL <100: ICD-10-CM

## 2019-07-08 DIAGNOSIS — F32.A ANXIETY AND DEPRESSION: ICD-10-CM

## 2019-07-08 DIAGNOSIS — Z01.818 PRE-OP EXAMINATION: Primary | ICD-10-CM

## 2019-07-08 PROCEDURE — G8417 CALC BMI ABV UP PARAM F/U: HCPCS | Performed by: NURSE PRACTITIONER

## 2019-07-08 PROCEDURE — 1036F TOBACCO NON-USER: CPT | Performed by: NURSE PRACTITIONER

## 2019-07-08 PROCEDURE — 93000 ELECTROCARDIOGRAM COMPLETE: CPT | Performed by: NURSE PRACTITIONER

## 2019-07-08 PROCEDURE — 3017F COLORECTAL CA SCREEN DOC REV: CPT | Performed by: NURSE PRACTITIONER

## 2019-07-08 PROCEDURE — 1123F ACP DISCUSS/DSCN MKR DOCD: CPT | Performed by: NURSE PRACTITIONER

## 2019-07-08 PROCEDURE — G8400 PT W/DXA NO RESULTS DOC: HCPCS | Performed by: NURSE PRACTITIONER

## 2019-07-08 PROCEDURE — 99214 OFFICE O/P EST MOD 30 MIN: CPT | Performed by: NURSE PRACTITIONER

## 2019-07-08 PROCEDURE — 4040F PNEUMOC VAC/ADMIN/RCVD: CPT | Performed by: NURSE PRACTITIONER

## 2019-07-08 PROCEDURE — 1090F PRES/ABSN URINE INCON ASSESS: CPT | Performed by: NURSE PRACTITIONER

## 2019-07-08 PROCEDURE — G8427 DOCREV CUR MEDS BY ELIG CLIN: HCPCS | Performed by: NURSE PRACTITIONER

## 2019-07-08 ASSESSMENT — ENCOUNTER SYMPTOMS
RHINORRHEA: 0
APNEA: 0
EYE PAIN: 0
CONSTIPATION: 0
VOMITING: 0
NAUSEA: 0
SHORTNESS OF BREATH: 0
SINUS PRESSURE: 0
CHEST TIGHTNESS: 0
WHEEZING: 0
EYE REDNESS: 0
COUGH: 0
BLOOD IN STOOL: 0
BACK PAIN: 0
DIARRHEA: 0
ABDOMINAL DISTENTION: 0
ABDOMINAL PAIN: 0

## 2019-07-09 LAB
A/G RATIO: 1.5 (ref 1.1–2.2)
ALBUMIN SERPL-MCNC: 4.6 G/DL (ref 3.4–5)
ALP BLD-CCNC: 73 U/L (ref 40–129)
ALT SERPL-CCNC: 15 U/L (ref 10–40)
ANION GAP SERPL CALCULATED.3IONS-SCNC: 16 MMOL/L (ref 3–16)
AST SERPL-CCNC: 15 U/L (ref 15–37)
BILIRUB SERPL-MCNC: 0.5 MG/DL (ref 0–1)
BUN BLDV-MCNC: 20 MG/DL (ref 7–20)
CALCIUM SERPL-MCNC: 10.4 MG/DL (ref 8.3–10.6)
CHLORIDE BLD-SCNC: 104 MMOL/L (ref 99–110)
CHOLESTEROL, TOTAL: 185 MG/DL (ref 0–199)
CO2: 22 MMOL/L (ref 21–32)
CREAT SERPL-MCNC: 1 MG/DL (ref 0.6–1.2)
GFR AFRICAN AMERICAN: >60
GFR NON-AFRICAN AMERICAN: 55
GLOBULIN: 3 G/DL
GLUCOSE BLD-MCNC: 111 MG/DL (ref 70–99)
HDLC SERPL-MCNC: 53 MG/DL (ref 40–60)
LDL CHOLESTEROL CALCULATED: 82 MG/DL
POTASSIUM SERPL-SCNC: 4.4 MMOL/L (ref 3.5–5.1)
SODIUM BLD-SCNC: 142 MMOL/L (ref 136–145)
TOTAL PROTEIN: 7.6 G/DL (ref 6.4–8.2)
TRIGL SERPL-MCNC: 250 MG/DL (ref 0–150)
VLDLC SERPL CALC-MCNC: 50 MG/DL

## 2019-07-28 DIAGNOSIS — E78.5 HYPERLIPIDEMIA LDL GOAL <100: ICD-10-CM

## 2019-07-28 DIAGNOSIS — I10 ESSENTIAL HYPERTENSION: ICD-10-CM

## 2019-07-29 RX ORDER — TELMISARTAN 80 MG/1
TABLET ORAL
Qty: 90 TABLET | Refills: 3 | Status: SHIPPED | OUTPATIENT
Start: 2019-07-29 | End: 2020-07-22

## 2019-07-29 RX ORDER — ROSUVASTATIN CALCIUM 10 MG/1
TABLET, COATED ORAL
Qty: 90 TABLET | Refills: 1 | Status: SHIPPED | OUTPATIENT
Start: 2019-07-29 | End: 2020-01-24

## 2019-08-19 ENCOUNTER — OFFICE VISIT (OUTPATIENT)
Dept: INTERNAL MEDICINE CLINIC | Age: 69
End: 2019-08-19
Payer: MEDICARE

## 2019-08-19 VITALS
SYSTOLIC BLOOD PRESSURE: 132 MMHG | BODY MASS INDEX: 31.72 KG/M2 | HEART RATE: 60 BPM | DIASTOLIC BLOOD PRESSURE: 74 MMHG | OXYGEN SATURATION: 96 % | WEIGHT: 190.6 LBS

## 2019-08-19 DIAGNOSIS — E78.5 HYPERLIPIDEMIA LDL GOAL <100: ICD-10-CM

## 2019-08-19 DIAGNOSIS — F41.9 ANXIETY AND DEPRESSION: ICD-10-CM

## 2019-08-19 DIAGNOSIS — M51.36 DDD (DEGENERATIVE DISC DISEASE), LUMBAR: ICD-10-CM

## 2019-08-19 DIAGNOSIS — E55.9 VITAMIN D DEFICIENCY: ICD-10-CM

## 2019-08-19 DIAGNOSIS — R73.03 PRE-DIABETES: ICD-10-CM

## 2019-08-19 DIAGNOSIS — I10 ESSENTIAL HYPERTENSION: ICD-10-CM

## 2019-08-19 DIAGNOSIS — F32.A ANXIETY AND DEPRESSION: ICD-10-CM

## 2019-08-19 DIAGNOSIS — G44.209 TENSION HEADACHE: ICD-10-CM

## 2019-08-19 DIAGNOSIS — G47.33 OSA (OBSTRUCTIVE SLEEP APNEA): ICD-10-CM

## 2019-08-19 LAB
A/G RATIO: 1.4 (ref 1.1–2.2)
ALBUMIN SERPL-MCNC: 4.9 G/DL (ref 3.4–5)
ALP BLD-CCNC: 76 U/L (ref 40–129)
ALT SERPL-CCNC: 11 U/L (ref 10–40)
ANION GAP SERPL CALCULATED.3IONS-SCNC: 19 MMOL/L (ref 3–16)
AST SERPL-CCNC: 13 U/L (ref 15–37)
BASOPHILS ABSOLUTE: 0.1 K/UL (ref 0–0.2)
BASOPHILS RELATIVE PERCENT: 1 %
BILIRUB SERPL-MCNC: 0.4 MG/DL (ref 0–1)
BUN BLDV-MCNC: 26 MG/DL (ref 7–20)
CALCIUM SERPL-MCNC: 10.3 MG/DL (ref 8.3–10.6)
CHLORIDE BLD-SCNC: 100 MMOL/L (ref 99–110)
CO2: 22 MMOL/L (ref 21–32)
CREAT SERPL-MCNC: 1 MG/DL (ref 0.6–1.2)
EOSINOPHILS ABSOLUTE: 0.2 K/UL (ref 0–0.6)
EOSINOPHILS RELATIVE PERCENT: 3.3 %
GFR AFRICAN AMERICAN: >60
GFR NON-AFRICAN AMERICAN: 55
GLOBULIN: 3.5 G/DL
GLUCOSE BLD-MCNC: 110 MG/DL (ref 70–99)
HCT VFR BLD CALC: 39.9 % (ref 36–48)
HEMOGLOBIN: 13.2 G/DL (ref 12–16)
LYMPHOCYTES ABSOLUTE: 1.7 K/UL (ref 1–5.1)
LYMPHOCYTES RELATIVE PERCENT: 23.3 %
MCH RBC QN AUTO: 29.3 PG (ref 26–34)
MCHC RBC AUTO-ENTMCNC: 33.1 G/DL (ref 31–36)
MCV RBC AUTO: 88.5 FL (ref 80–100)
MONOCYTES ABSOLUTE: 0.7 K/UL (ref 0–1.3)
MONOCYTES RELATIVE PERCENT: 8.7 %
NEUTROPHILS ABSOLUTE: 4.8 K/UL (ref 1.7–7.7)
NEUTROPHILS RELATIVE PERCENT: 63.7 %
PDW BLD-RTO: 14.4 % (ref 12.4–15.4)
PLATELET # BLD: 283 K/UL (ref 135–450)
PMV BLD AUTO: 8.5 FL (ref 5–10.5)
POTASSIUM SERPL-SCNC: 4.8 MMOL/L (ref 3.5–5.1)
RBC # BLD: 4.51 M/UL (ref 4–5.2)
SODIUM BLD-SCNC: 141 MMOL/L (ref 136–145)
TOTAL PROTEIN: 8.4 G/DL (ref 6.4–8.2)
TSH REFLEX: 1.76 UIU/ML (ref 0.27–4.2)
VITAMIN D 25-HYDROXY: 45 NG/ML
WBC # BLD: 7.5 K/UL (ref 4–11)

## 2019-08-19 PROCEDURE — G8400 PT W/DXA NO RESULTS DOC: HCPCS | Performed by: INTERNAL MEDICINE

## 2019-08-19 PROCEDURE — 1123F ACP DISCUSS/DSCN MKR DOCD: CPT | Performed by: INTERNAL MEDICINE

## 2019-08-19 PROCEDURE — 1036F TOBACCO NON-USER: CPT | Performed by: INTERNAL MEDICINE

## 2019-08-19 PROCEDURE — G8417 CALC BMI ABV UP PARAM F/U: HCPCS | Performed by: INTERNAL MEDICINE

## 2019-08-19 PROCEDURE — 3017F COLORECTAL CA SCREEN DOC REV: CPT | Performed by: INTERNAL MEDICINE

## 2019-08-19 PROCEDURE — 1090F PRES/ABSN URINE INCON ASSESS: CPT | Performed by: INTERNAL MEDICINE

## 2019-08-19 PROCEDURE — G8427 DOCREV CUR MEDS BY ELIG CLIN: HCPCS | Performed by: INTERNAL MEDICINE

## 2019-08-19 PROCEDURE — 99214 OFFICE O/P EST MOD 30 MIN: CPT | Performed by: INTERNAL MEDICINE

## 2019-08-19 PROCEDURE — 4040F PNEUMOC VAC/ADMIN/RCVD: CPT | Performed by: INTERNAL MEDICINE

## 2019-08-19 ASSESSMENT — ENCOUNTER SYMPTOMS
DIARRHEA: 0
SHORTNESS OF BREATH: 0
CHEST TIGHTNESS: 0
CONSTIPATION: 0

## 2019-08-19 NOTE — PROGRESS NOTES
for constipation and diarrhea. Skin: Negative for rash. /74   Pulse 60   Wt 190 lb 9.6 oz (86.5 kg)   SpO2 96%   BMI 31.72 kg/m²   Physical Exam   Constitutional: She appears well-developed and well-nourished. She is cooperative. Non-toxic appearance. HENT:   Head: Normocephalic. Right Ear: Tympanic membrane, external ear and ear canal normal.   Left Ear: Tympanic membrane, external ear and ear canal normal.   Nose: Nose normal.   Mouth/Throat: Oropharynx is clear and moist and mucous membranes are normal.   Neck: Carotid bruit is not present. No thyroid mass and no thyromegaly present. Cardiovascular: Normal rate, regular rhythm, normal heart sounds and intact distal pulses. No murmur heard. Pulses:       Dorsalis pedis pulses are 2+ on the right side, and 2+ on the left side. No LE edema   Pulmonary/Chest: Effort normal and breath sounds normal.   Lymphadenopathy:     She has no cervical adenopathy. Neurological: She is alert. Psychiatric: She has a normal mood and affect. ASSESSMENT/PLAN:    Problem List Items Addressed This Visit     Anxiety and depression     Continues to do well on Cymbalta 30 mg qd. Does not feel that the dose needs to be increased at this time. DDD (degenerative disc disease), lumbar     Had spinal stimulator placed recently. Has had improvement in pain and increased activity. Is still working on adjusting the current. Essential hypertension     Well controlled. Continue Micardis 80 mg po qd and amlodipine 10 mg po qd. Relevant Orders    CBC Auto Differential    Hyperlipidemia LDL goal <100     Check labs. Is now on Crestor 10 mg qd. Did not tolerate Lipitor. Relevant Orders    CBC Auto Differential    Comprehensive Metabolic Panel    Lipid Panel    TSH with Reflex    CANDELARIA (obstructive sleep apnea)     Continues to be compliant with CPAP. Pre-diabetes     Check labs. No medication.           Relevant Orders

## 2019-08-20 LAB
ESTIMATED AVERAGE GLUCOSE: 125.5 MG/DL
HBA1C MFR BLD: 6 %

## 2019-08-24 PROBLEM — G44.209 TENSION HEADACHE: Status: ACTIVE | Noted: 2019-08-24

## 2019-08-25 NOTE — ASSESSMENT & PLAN NOTE
Possibly related to recent blepharoplasty. Will follow up with Dr. Cristiane Ellis who offered treatment at her post-op f/u.

## 2019-11-13 DIAGNOSIS — I10 ESSENTIAL HYPERTENSION: ICD-10-CM

## 2019-11-13 RX ORDER — AMLODIPINE BESYLATE 10 MG/1
TABLET ORAL
Qty: 90 TABLET | Refills: 4 | Status: SHIPPED | OUTPATIENT
Start: 2019-11-13 | End: 2021-02-05

## 2020-01-24 RX ORDER — ROSUVASTATIN CALCIUM 10 MG/1
TABLET, COATED ORAL
Qty: 90 TABLET | Refills: 4 | Status: SHIPPED | OUTPATIENT
Start: 2020-01-24 | End: 2021-02-26 | Stop reason: SDUPTHER

## 2020-02-14 RX ORDER — ERGOCALCIFEROL 1.25 MG/1
CAPSULE ORAL
Qty: 12 CAPSULE | Refills: 4 | Status: SHIPPED | OUTPATIENT
Start: 2020-02-14 | End: 2021-02-26 | Stop reason: SDUPTHER

## 2020-02-21 ENCOUNTER — OFFICE VISIT (OUTPATIENT)
Dept: INTERNAL MEDICINE CLINIC | Age: 70
End: 2020-02-21
Payer: MEDICARE

## 2020-02-21 VITALS
BODY MASS INDEX: 32.05 KG/M2 | OXYGEN SATURATION: 97 % | WEIGHT: 192.6 LBS | DIASTOLIC BLOOD PRESSURE: 82 MMHG | HEART RATE: 56 BPM | SYSTOLIC BLOOD PRESSURE: 132 MMHG

## 2020-02-21 PROCEDURE — 4040F PNEUMOC VAC/ADMIN/RCVD: CPT | Performed by: INTERNAL MEDICINE

## 2020-02-21 PROCEDURE — G8484 FLU IMMUNIZE NO ADMIN: HCPCS | Performed by: INTERNAL MEDICINE

## 2020-02-21 PROCEDURE — G8400 PT W/DXA NO RESULTS DOC: HCPCS | Performed by: INTERNAL MEDICINE

## 2020-02-21 PROCEDURE — 99214 OFFICE O/P EST MOD 30 MIN: CPT | Performed by: INTERNAL MEDICINE

## 2020-02-21 PROCEDURE — 3017F COLORECTAL CA SCREEN DOC REV: CPT | Performed by: INTERNAL MEDICINE

## 2020-02-21 PROCEDURE — G8427 DOCREV CUR MEDS BY ELIG CLIN: HCPCS | Performed by: INTERNAL MEDICINE

## 2020-02-21 PROCEDURE — G8417 CALC BMI ABV UP PARAM F/U: HCPCS | Performed by: INTERNAL MEDICINE

## 2020-02-21 PROCEDURE — 1036F TOBACCO NON-USER: CPT | Performed by: INTERNAL MEDICINE

## 2020-02-21 PROCEDURE — 1090F PRES/ABSN URINE INCON ASSESS: CPT | Performed by: INTERNAL MEDICINE

## 2020-02-21 PROCEDURE — 1123F ACP DISCUSS/DSCN MKR DOCD: CPT | Performed by: INTERNAL MEDICINE

## 2020-02-21 RX ORDER — PREDNISONE 10 MG/1
TABLET ORAL
Qty: 30 TABLET | Refills: 1 | Status: SHIPPED | OUTPATIENT
Start: 2020-02-21 | End: 2020-04-29 | Stop reason: ALTCHOICE

## 2020-02-21 RX ORDER — DULOXETIN HYDROCHLORIDE 30 MG/1
60 CAPSULE, DELAYED RELEASE ORAL DAILY
Qty: 90 CAPSULE | Refills: 3
Start: 2020-02-21 | End: 2020-07-06

## 2020-02-21 RX ORDER — METHOCARBAMOL 750 MG/1
750 TABLET, FILM COATED ORAL 2 TIMES DAILY PRN
Qty: 60 TABLET | Refills: 1 | Status: SHIPPED | OUTPATIENT
Start: 2020-02-21 | End: 2020-04-01

## 2020-02-21 ASSESSMENT — ENCOUNTER SYMPTOMS
CHEST TIGHTNESS: 0
SHORTNESS OF BREATH: 0
DIARRHEA: 0
CONSTIPATION: 0
BACK PAIN: 1

## 2020-02-21 NOTE — PROGRESS NOTES
02/14/2019     Lab Results   Component Value Date    LABVLDL 50 07/08/2019    LABVLDL see below 02/14/2019     No results found for: [facility]  Lab Results   Component Value Date    LABA1C 6.0 08/19/2019     Lab Results   Component Value Date    .5 08/19/2019     Lab Results   Component Value Date    LABA1C 6.0 08/19/2019    LABA1C 5.6 02/14/2019     Lab Results   Component Value Date    LDLCALC 82 07/08/2019    CREATININE 1.0 08/19/2019           Allergies   Allergen Reactions    Compazine [Prochlorperazine Maleate] Shortness Of Breath     dyspnea    Morphine Nausea And Vomiting     N&V      Past Medical History:   Diagnosis Date    Back pain, chronic     Depression     Joint pain     Sleep apnea       Past Surgical History:   Procedure Laterality Date    LUMBAR FUSION  2016    PARTIAL HYSTERECTOMY  2002      Social History     Socioeconomic History    Marital status:      Spouse name: Not on file    Number of children: Not on file    Years of education: Not on file    Highest education level: Not on file   Occupational History    Occupation: retired   Social Needs    Financial resource strain: Not on file    Food insecurity:     Worry: Not on file     Inability: Not on file   emere needs:     Medical: Not on file     Non-medical: Not on file   Tobacco Use    Smoking status: Never Smoker    Smokeless tobacco: Never Used   Substance and Sexual Activity    Alcohol use: No    Drug use: No    Sexual activity: Not on file   Lifestyle    Physical activity:     Days per week: Not on file     Minutes per session: Not on file    Stress: Not on file   Relationships    Social connections:     Talks on phone: Not on file     Gets together: Not on file     Attends [employer] service: Not on file     Active member of club or organization: Not on file     Attends meetings of clubs or organizations: Not on file     Relationship status: Not on file    Intimate partner violence: Hyperlipidemia LDL goal <100     Due for labs- check lipid panel and CMP. Remains on Crestor 10 mg qd. Relevant Orders    Lipid Panel    TSH with Reflex    CANDELARIA (obstructive sleep apnea)     Remains compliant with CPAP. Pre-diabetes     Due for labs. Check HbA1c. Relevant Orders    Hemoglobin A1C    Vitamin D deficiency     Check labs. Continue weekly Ergocalciferol. Relevant Orders    Vitamin D 25 Hydroxy          Current Outpatient Medications   Medication Sig Dispense Refill    predniSONE (DELTASONE) 10 MG tablet 1-2 po qd prn severe pain 30 tablet 1    methocarbamol (ROBAXIN) 750 MG tablet Take 1 tablet by mouth 2 times daily as needed (pain and spasm) 60 tablet 1    DULoxetine (CYMBALTA) 30 MG extended release capsule Take 2 capsules by mouth daily 90 capsule 3    vitamin D (ERGOCALCIFEROL) 1.25 MG (07148 UT) CAPS capsule TAKE 1 CAPSULE ONCE A WEEK 12 capsule 4    rosuvastatin (CRESTOR) 10 MG tablet TAKE 1 TABLET NIGHTLY 90 tablet 4    amLODIPine (NORVASC) 10 MG tablet TAKE 1 TABLET DAILY 90 tablet 4    telmisartan (MICARDIS) 80 MG tablet TAKE 1 TABLET DAILY 90 tablet 3    metoprolol succinate (TOPROL XL) 100 MG extended release tablet Take 1 tablet by mouth 2 times daily 180 tablet 3    omeprazole (PRILOSEC) 20 MG delayed release capsule TAKE 1 CAPSULE DAILY (NEW DOSE) 90 capsule 3    niacin 500 MG extended release capsule Take 500 mg by mouth nightly      Magnesium Hydroxide (MAGNESIA PO) Take by mouth      CPAP Machine MISC by Does not apply route       No current facility-administered medications for this visit. No follow-ups on file.

## 2020-03-10 DIAGNOSIS — E78.5 HYPERLIPIDEMIA LDL GOAL <100: ICD-10-CM

## 2020-03-10 DIAGNOSIS — I10 ESSENTIAL HYPERTENSION: ICD-10-CM

## 2020-03-10 DIAGNOSIS — E55.9 VITAMIN D DEFICIENCY: ICD-10-CM

## 2020-03-10 DIAGNOSIS — R73.03 PRE-DIABETES: ICD-10-CM

## 2020-03-10 LAB
A/G RATIO: 0.9 (ref 1.1–2.2)
ALBUMIN SERPL-MCNC: 3.7 G/DL (ref 3.4–5)
ALP BLD-CCNC: 97 U/L (ref 40–129)
ALT SERPL-CCNC: 14 U/L (ref 10–40)
ANION GAP SERPL CALCULATED.3IONS-SCNC: 14 MMOL/L (ref 3–16)
AST SERPL-CCNC: 16 U/L (ref 15–37)
BASOPHILS ABSOLUTE: 0.1 K/UL (ref 0–0.2)
BASOPHILS RELATIVE PERCENT: 0.9 %
BILIRUB SERPL-MCNC: 0.3 MG/DL (ref 0–1)
BUN BLDV-MCNC: 21 MG/DL (ref 7–20)
CALCIUM SERPL-MCNC: 9.8 MG/DL (ref 8.3–10.6)
CHLORIDE BLD-SCNC: 99 MMOL/L (ref 99–110)
CHOLESTEROL, TOTAL: 159 MG/DL (ref 0–199)
CO2: 24 MMOL/L (ref 21–32)
CREAT SERPL-MCNC: 1.2 MG/DL (ref 0.6–1.2)
EOSINOPHILS ABSOLUTE: 0.4 K/UL (ref 0–0.6)
EOSINOPHILS RELATIVE PERCENT: 4.9 %
ESTIMATED AVERAGE GLUCOSE: 145.6 MG/DL
GFR AFRICAN AMERICAN: 54
GFR NON-AFRICAN AMERICAN: 44
GLOBULIN: 4.1 G/DL
GLUCOSE BLD-MCNC: 122 MG/DL (ref 70–99)
HBA1C MFR BLD: 6.7 %
HCT VFR BLD CALC: 40.1 % (ref 36–48)
HDLC SERPL-MCNC: 37 MG/DL (ref 40–60)
HEMOGLOBIN: 13.3 G/DL (ref 12–16)
LDL CHOLESTEROL CALCULATED: 85 MG/DL
LYMPHOCYTES ABSOLUTE: 2.2 K/UL (ref 1–5.1)
LYMPHOCYTES RELATIVE PERCENT: 26.5 %
MCH RBC QN AUTO: 27.9 PG (ref 26–34)
MCHC RBC AUTO-ENTMCNC: 33.2 G/DL (ref 31–36)
MCV RBC AUTO: 84 FL (ref 80–100)
MONOCYTES ABSOLUTE: 0.6 K/UL (ref 0–1.3)
MONOCYTES RELATIVE PERCENT: 7.7 %
NEUTROPHILS ABSOLUTE: 5 K/UL (ref 1.7–7.7)
NEUTROPHILS RELATIVE PERCENT: 60 %
PDW BLD-RTO: 13.7 % (ref 12.4–15.4)
PLATELET # BLD: 280 K/UL (ref 135–450)
PMV BLD AUTO: 8.5 FL (ref 5–10.5)
POTASSIUM SERPL-SCNC: 5.3 MMOL/L (ref 3.5–5.1)
RBC # BLD: 4.78 M/UL (ref 4–5.2)
SODIUM BLD-SCNC: 137 MMOL/L (ref 136–145)
TOTAL PROTEIN: 7.8 G/DL (ref 6.4–8.2)
TRIGL SERPL-MCNC: 184 MG/DL (ref 0–150)
TSH REFLEX: 2.12 UIU/ML (ref 0.27–4.2)
VITAMIN D 25-HYDROXY: 46.1 NG/ML
VLDLC SERPL CALC-MCNC: 37 MG/DL
WBC # BLD: 8.4 K/UL (ref 4–11)

## 2020-04-06 DIAGNOSIS — N18.30 STAGE 3 CHRONIC KIDNEY DISEASE (HCC): ICD-10-CM

## 2020-04-06 DIAGNOSIS — E55.9 VITAMIN D DEFICIENCY: ICD-10-CM

## 2020-04-06 DIAGNOSIS — E78.5 HYPERLIPIDEMIA LDL GOAL <100: ICD-10-CM

## 2020-04-06 DIAGNOSIS — R73.03 PRE-DIABETES: ICD-10-CM

## 2020-04-06 DIAGNOSIS — I10 ESSENTIAL HYPERTENSION: ICD-10-CM

## 2020-04-06 LAB
A/G RATIO: 1.3 (ref 1.1–2.2)
ALBUMIN SERPL-MCNC: 4.1 G/DL (ref 3.4–5)
ALP BLD-CCNC: 90 U/L (ref 40–129)
ALT SERPL-CCNC: 12 U/L (ref 10–40)
ANION GAP SERPL CALCULATED.3IONS-SCNC: 16 MMOL/L (ref 3–16)
AST SERPL-CCNC: 14 U/L (ref 15–37)
BASOPHILS ABSOLUTE: 0.1 K/UL (ref 0–0.2)
BASOPHILS RELATIVE PERCENT: 0.7 %
BILIRUB SERPL-MCNC: 0.3 MG/DL (ref 0–1)
BUN BLDV-MCNC: 16 MG/DL (ref 7–20)
CALCIUM SERPL-MCNC: 9.5 MG/DL (ref 8.3–10.6)
CHLORIDE BLD-SCNC: 105 MMOL/L (ref 99–110)
CHOLESTEROL, TOTAL: 166 MG/DL (ref 0–199)
CO2: 20 MMOL/L (ref 21–32)
CREAT SERPL-MCNC: 1 MG/DL (ref 0.6–1.2)
EOSINOPHILS ABSOLUTE: 0.5 K/UL (ref 0–0.6)
EOSINOPHILS RELATIVE PERCENT: 6 %
GFR AFRICAN AMERICAN: >60
GFR NON-AFRICAN AMERICAN: 55
GLOBULIN: 3.1 G/DL
GLUCOSE BLD-MCNC: 128 MG/DL (ref 70–99)
HCT VFR BLD CALC: 39.6 % (ref 36–48)
HDLC SERPL-MCNC: 45 MG/DL (ref 40–60)
HEMOGLOBIN: 13 G/DL (ref 12–16)
LDL CHOLESTEROL CALCULATED: 86 MG/DL
LYMPHOCYTES ABSOLUTE: 2.2 K/UL (ref 1–5.1)
LYMPHOCYTES RELATIVE PERCENT: 29 %
MCH RBC QN AUTO: 27.6 PG (ref 26–34)
MCHC RBC AUTO-ENTMCNC: 32.8 G/DL (ref 31–36)
MCV RBC AUTO: 84.1 FL (ref 80–100)
MONOCYTES ABSOLUTE: 0.6 K/UL (ref 0–1.3)
MONOCYTES RELATIVE PERCENT: 8.3 %
NEUTROPHILS ABSOLUTE: 4.2 K/UL (ref 1.7–7.7)
NEUTROPHILS RELATIVE PERCENT: 56 %
PDW BLD-RTO: 14.4 % (ref 12.4–15.4)
PLATELET # BLD: 283 K/UL (ref 135–450)
PMV BLD AUTO: 8.9 FL (ref 5–10.5)
POTASSIUM SERPL-SCNC: 4 MMOL/L (ref 3.5–5.1)
RBC # BLD: 4.71 M/UL (ref 4–5.2)
SODIUM BLD-SCNC: 141 MMOL/L (ref 136–145)
TOTAL PROTEIN: 7.2 G/DL (ref 6.4–8.2)
TRIGL SERPL-MCNC: 174 MG/DL (ref 0–150)
TSH REFLEX: 1.75 UIU/ML (ref 0.27–4.2)
VITAMIN D 25-HYDROXY: 39.2 NG/ML
VLDLC SERPL CALC-MCNC: 35 MG/DL
WBC # BLD: 7.6 K/UL (ref 4–11)

## 2020-04-07 LAB
ESTIMATED AVERAGE GLUCOSE: 134.1 MG/DL
HBA1C MFR BLD: 6.3 %

## 2020-04-29 ENCOUNTER — VIRTUAL VISIT (OUTPATIENT)
Dept: INTERNAL MEDICINE CLINIC | Age: 70
End: 2020-04-29
Payer: MEDICARE

## 2020-04-29 PROBLEM — M54.2 ACUTE NECK PAIN: Status: ACTIVE | Noted: 2020-04-29

## 2020-04-29 PROBLEM — M54.12 CERVICAL RADICULOPATHY: Status: ACTIVE | Noted: 2020-04-29

## 2020-04-29 PROBLEM — H53.9 VISION DISTURBANCE: Status: ACTIVE | Noted: 2020-04-29

## 2020-04-29 PROCEDURE — G8400 PT W/DXA NO RESULTS DOC: HCPCS | Performed by: INTERNAL MEDICINE

## 2020-04-29 PROCEDURE — G8428 CUR MEDS NOT DOCUMENT: HCPCS | Performed by: INTERNAL MEDICINE

## 2020-04-29 PROCEDURE — 1123F ACP DISCUSS/DSCN MKR DOCD: CPT | Performed by: INTERNAL MEDICINE

## 2020-04-29 PROCEDURE — 4040F PNEUMOC VAC/ADMIN/RCVD: CPT | Performed by: INTERNAL MEDICINE

## 2020-04-29 PROCEDURE — 3017F COLORECTAL CA SCREEN DOC REV: CPT | Performed by: INTERNAL MEDICINE

## 2020-04-29 PROCEDURE — 99214 OFFICE O/P EST MOD 30 MIN: CPT | Performed by: INTERNAL MEDICINE

## 2020-04-29 PROCEDURE — 1090F PRES/ABSN URINE INCON ASSESS: CPT | Performed by: INTERNAL MEDICINE

## 2020-04-29 RX ORDER — GABAPENTIN 100 MG/1
CAPSULE ORAL
Qty: 90 CAPSULE | Refills: 0 | Status: SHIPPED | OUTPATIENT
Start: 2020-04-29 | End: 2020-05-12 | Stop reason: SDUPTHER

## 2020-04-29 RX ORDER — PREDNISONE 10 MG/1
TABLET ORAL
Qty: 30 TABLET | Refills: 0 | Status: SHIPPED | OUTPATIENT
Start: 2020-04-29 | End: 2020-06-26

## 2020-04-29 RX ORDER — TIZANIDINE 2 MG/1
2 TABLET ORAL NIGHTLY PRN
Qty: 10 TABLET | Refills: 0 | Status: SHIPPED | OUTPATIENT
Start: 2020-04-29 | End: 2020-08-12 | Stop reason: ALTCHOICE

## 2020-05-03 NOTE — ASSESSMENT & PLAN NOTE
Intermittent double vision in left eye. Not likely related to neck pain. Encouraged to contact Dr. Skye Pizano, her ophthalmologist. May need MRI of brain.

## 2020-05-03 NOTE — ASSESSMENT & PLAN NOTE
Suspect bulging disc +/- arthritis. Unable to use NSAID's d/t CKD. Treat with prednisone taper. Trial of gabapentin 100 mg qhs, triturating up to 300 mg if needed. Recommend trial of Salon Pas patches. Stretches given to patient. Consider PT and MRI once COVID allows.

## 2020-05-05 ENCOUNTER — TELEPHONE (OUTPATIENT)
Dept: INTERNAL MEDICINE CLINIC | Age: 70
End: 2020-05-05

## 2020-05-05 NOTE — TELEPHONE ENCOUNTER
Can you please see where she is trying to get this done and see if you can clear up what we need to do for her. Thank you.  saw

## 2020-05-05 NOTE — TELEPHONE ENCOUNTER
PT STATE THAT MRI NEEDS APPROVAL FROM RADIOLOGIST. DIDN'T SAY WHY, SHE JUST NEEDS IT DONE.  DOES HAVE A PAIN STIMULATOR IN BACK.     PLEASE ADVISE

## 2020-05-05 NOTE — TELEPHONE ENCOUNTER
Spoke with pt and she stated that they told her that her pain stimulator is not compatible with there machine. Pt is going to call [facility] and see if she can have it there.

## 2020-05-08 NOTE — TELEPHONE ENCOUNTER
I also would have her check with [facility] as they may have a specific location where they send patients with spine stimulators for MRI's. saw

## 2020-05-11 ENCOUNTER — TELEPHONE (OUTPATIENT)
Dept: INTERNAL MEDICINE CLINIC | Age: 70
End: 2020-05-11

## 2020-05-11 DIAGNOSIS — M54.12 CERVICAL RADICULOPATHY: ICD-10-CM

## 2020-05-11 DIAGNOSIS — M54.2 ACUTE NECK PAIN: ICD-10-CM

## 2020-05-11 NOTE — TELEPHONE ENCOUNTER
Patient Is calling in to speak to Foster Alonzo only if needed.     Patient is calling in with fax number : [phone]

## 2020-05-12 RX ORDER — GABAPENTIN 300 MG/1
300 CAPSULE ORAL NIGHTLY
Qty: 90 CAPSULE | Refills: 1 | Status: SHIPPED | OUTPATIENT
Start: 2020-05-12 | End: 2020-10-26

## 2020-05-28 RX ORDER — METOPROLOL SUCCINATE 100 MG
TABLET, EXTENDED RELEASE 24 HR ORAL
Qty: 180 TABLET | Refills: 3 | Status: SHIPPED | OUTPATIENT
Start: 2020-05-28 | End: 2021-02-26 | Stop reason: SDUPTHER

## 2020-06-08 ENCOUNTER — TELEPHONE (OUTPATIENT)
Dept: INTERNAL MEDICINE CLINIC | Age: 70
End: 2020-06-08

## 2020-06-08 RX ORDER — PREDNISONE 20 MG/1
20 TABLET ORAL DAILY
Qty: 14 TABLET | Refills: 0 | Status: SHIPPED | OUTPATIENT
Start: 2020-06-08 | End: 2020-06-22

## 2020-06-08 RX ORDER — METHOCARBAMOL 750 MG/1
750 TABLET, FILM COATED ORAL 2 TIMES DAILY PRN
Qty: 30 TABLET | Refills: 0 | Status: SHIPPED | OUTPATIENT
Start: 2020-06-08 | End: 2020-06-30

## 2020-06-08 NOTE — TELEPHONE ENCOUNTER
Yes, I will call in more prednisone and methocarbanol for her to [facility] in [place].   saw

## 2020-06-10 ENCOUNTER — TELEPHONE (OUTPATIENT)
Dept: INTERNAL MEDICINE CLINIC | Age: 70
End: 2020-06-10

## 2020-06-12 ENCOUNTER — TELEPHONE (OUTPATIENT)
Dept: INTERNAL MEDICINE CLINIC | Age: 70
End: 2020-06-12

## 2020-06-26 ENCOUNTER — TELEPHONE (OUTPATIENT)
Dept: INTERNAL MEDICINE CLINIC | Age: 70
End: 2020-06-26

## 2020-06-26 RX ORDER — PREDNISONE 10 MG/1
TABLET ORAL
Qty: 30 TABLET | Refills: 0 | Status: SHIPPED | OUTPATIENT
Start: 2020-06-26 | End: 2020-08-12 | Stop reason: ALTCHOICE

## 2020-06-30 RX ORDER — METHOCARBAMOL 750 MG/1
750 TABLET, FILM COATED ORAL 2 TIMES DAILY PRN
Qty: 30 TABLET | Refills: 0 | Status: SHIPPED | OUTPATIENT
Start: 2020-06-30 | End: 2020-08-12 | Stop reason: ALTCHOICE

## 2020-07-06 RX ORDER — DULOXETIN HYDROCHLORIDE 30 MG/1
CAPSULE, DELAYED RELEASE ORAL
Qty: 90 CAPSULE | Refills: 3 | Status: SHIPPED | OUTPATIENT
Start: 2020-07-06 | End: 2020-08-21 | Stop reason: SDUPTHER

## 2020-07-22 RX ORDER — TELMISARTAN 80 MG/1
TABLET ORAL
Qty: 90 TABLET | Refills: 3 | Status: SHIPPED | OUTPATIENT
Start: 2020-07-22 | End: 2021-02-26 | Stop reason: SDUPTHER

## 2020-08-12 ENCOUNTER — VIRTUAL VISIT (OUTPATIENT)
Dept: INTERNAL MEDICINE CLINIC | Age: 70
End: 2020-08-12
Payer: MEDICARE

## 2020-08-12 VITALS — HEIGHT: 65 IN | BODY MASS INDEX: 32.05 KG/M2

## 2020-08-12 PROCEDURE — 3017F COLORECTAL CA SCREEN DOC REV: CPT | Performed by: INTERNAL MEDICINE

## 2020-08-12 PROCEDURE — G0439 PPPS, SUBSEQ VISIT: HCPCS | Performed by: INTERNAL MEDICINE

## 2020-08-12 PROCEDURE — 1123F ACP DISCUSS/DSCN MKR DOCD: CPT | Performed by: INTERNAL MEDICINE

## 2020-08-12 PROCEDURE — G0444 DEPRESSION SCREEN ANNUAL: HCPCS | Performed by: INTERNAL MEDICINE

## 2020-08-12 PROCEDURE — 4040F PNEUMOC VAC/ADMIN/RCVD: CPT | Performed by: INTERNAL MEDICINE

## 2020-08-12 SDOH — ECONOMIC STABILITY: TRANSPORTATION INSECURITY
IN THE PAST 12 MONTHS, HAS LACK OF TRANSPORTATION KEPT YOU FROM MEETINGS, WORK, OR FROM GETTING THINGS NEEDED FOR DAILY LIVING?: NO

## 2020-08-12 SDOH — ECONOMIC STABILITY: FOOD INSECURITY: WITHIN THE PAST 12 MONTHS, THE FOOD YOU BOUGHT JUST DIDN'T LAST AND YOU DIDN'T HAVE MONEY TO GET MORE.: NEVER TRUE

## 2020-08-12 SDOH — ECONOMIC STABILITY: TRANSPORTATION INSECURITY
IN THE PAST 12 MONTHS, HAS THE LACK OF TRANSPORTATION KEPT YOU FROM MEDICAL APPOINTMENTS OR FROM GETTING MEDICATIONS?: NO

## 2020-08-12 SDOH — ECONOMIC STABILITY: FOOD INSECURITY: WITHIN THE PAST 12 MONTHS, YOU WORRIED THAT YOUR FOOD WOULD RUN OUT BEFORE YOU GOT MONEY TO BUY MORE.: NEVER TRUE

## 2020-08-12 SDOH — ECONOMIC STABILITY: INCOME INSECURITY: HOW HARD IS IT FOR YOU TO PAY FOR THE VERY BASICS LIKE FOOD, HOUSING, MEDICAL CARE, AND HEATING?: NOT HARD AT ALL

## 2020-08-12 ASSESSMENT — LIFESTYLE VARIABLES: HOW OFTEN DO YOU HAVE A DRINK CONTAINING ALCOHOL: 0

## 2020-08-12 ASSESSMENT — PATIENT HEALTH QUESTIONNAIRE - PHQ9: SUM OF ALL RESPONSES TO PHQ QUESTIONS 1-9: 8

## 2020-08-12 NOTE — PROGRESS NOTES
Medicare Annual Wellness Visit  Name: Iantha Severs Date: 2020   MRN: <[id number]> Sex: Female   Age: 79 y.o. Ethnicity: Unavailable/Unknown   : 1950 Race: Fredo Helm is here for Medicare AWV    Screenings for behavioral, psychosocial and functional/safety risks, and cognitive dysfunction are all negative except as indicated below. These results, as well as other patient data from the [address] form, are documented in Flowsheets linked to this Encounter. Allergies   Allergen Reactions    Compazine [Prochlorperazine Maleate] Shortness Of Breath     dyspnea    Morphine Nausea And Vomiting     N&V       Prior to Visit Medications    Medication Sig Taking? Authorizing Provider   telmisartan (MICARDIS) 80 MG tablet TAKE 1 TABLET DAILY Yes Rufino George DO   DULoxetine (CYMBALTA) 30 MG extended release capsule TAKE 1 CAPSULE DAILY Yes Rufino George, DO   TOPROL  MG extended release tablet TAKE 1 TABLET TWICE A DAY Yes Rufino George DO   gabapentin (NEURONTIN) 300 MG capsule Take 1 capsule by mouth nightly for 180 days.  1 po qhs x 3 days -> 2 po qhs x 3 days -> 3 po qhs Yes Rufino George, DO   vitamin D (ERGOCALCIFEROL) 1.25 MG (48430 UT) CAPS capsule TAKE 1 CAPSULE ONCE A WEEK Yes Rufino George DO   rosuvastatin (CRESTOR) 10 MG tablet TAKE 1 TABLET NIGHTLY Yes Rufino George DO   omeprazole (PRILOSEC) 20 MG delayed release capsule TAKE 1 CAPSULE DAILY (NEW DOSE) Yes Rufino George DO   niacin 500 MG extended release capsule Take 500 mg by mouth nightly Yes Historical Provider, MD   Magnesium Hydroxide (MAGNESIA PO) Take 50 mg by mouth daily  Yes Historical Provider, MD   amLODIPine (NORVASC) 10 MG tablet TAKE 1 TABLET DAILY  Rufino George DO   CPAP Machine MISC by Does not apply route  Historical Provider, MD       Past Medical History:   Diagnosis Date    Back pain, chronic     Depression     Joint pain     Sleep apnea Past Surgical History:   Procedure Laterality Date    LUMBAR FUSION  2016    PARTIAL HYSTERECTOMY  2002    SPINAL CORD STIMULATOR SURGERY      Transmit receive head coil only       Family History   Problem Relation Age of Onset    Heart Failure Mother     COPD Father        CareTeam (Including outside providers/suppliers regularly involved in providing care):   Patient Care Team:  Brooklyn Mandujano, DO as PCP - General (Internal Medicine)  Brooklyn Course, DO as PCP - [facility] Empaneled Provider    Wt Readings from Last 3 Encounters:   02/21/20 192 lb 9.6 oz (87.4 kg)   08/19/19 190 lb 9.6 oz (86.5 kg)   07/08/19 189 lb 12.8 oz (86.1 kg)     There were no vitals filed for this visit. There is no height or weight on file to calculate BMI. Based upon direct observation of the patient, evaluation of cognition reveals recent and remote memory intact. Patient's complete Health Risk Assessment and screening values have been reviewed and are found in Flowsheets. The following problems were reviewed today and where indicated follow up appointments were made and/or referrals ordered. Positive Risk Factor Screenings with Interventions:     Depression:  PHQ-2 Score: 6  PHQ-9 Total Score: 8  Depression Screening Interpretation: (!) PHQ-9 Score 5-9: Mild Depression  Depression Interventions:  · Patient advised to follow-up in this office for further evaluation and treatment within 1 week. Patient will address during appointment on 8/21/20. Declined referral to psychiatry. Health Habits/Nutrition:  Health Habits/Nutrition  Do you exercise for at least 20 minutes 2-3 times per week?: (!) No  Have you lost any weight without trying in the past 3 months?: No  Do you eat fewer than 2 meals per day?: No  Have you seen a dentist within the past year?: (!) No  There is no height or weight on file to calculate BMI.   Health Habits/Nutrition Interventions:  · Inadequate physical activity:  patient agrees to exercise for at least 150 minutes/week. Patient will consider aqua therapy once restrictions are lifted due to pandemic. ADL:  ADLs  In the past 7 days, did you need help from others to perform any of the following everyday activities? Eating, dressing, grooming, bathing, toileting, or walking/balance?: None  In the past 7 days, did you need help from others to take care of any of the following? Laundry, housekeeping, banking/finances, shopping, telephone use, food preparation, transportation, or taking medications?: [facility]  ADL Interventions:  · Patient declines any further evaluation/treatment for this issue. Patient has supportive spouse who assists with ADLs as needed. Personalized Preventive Plan   Current Health Maintenance Status  There is no immunization history for the selected administration types on file for this patient. Health Maintenance   Topic Date Due    Hepatitis C screen  1950    DTaP/Tdap/Td vaccine (1 - Tdap) 03/28/1969    Breast cancer screen  03/28/2000    Shingles Vaccine (1 of 2) 03/28/2000    Colon cancer screen colonoscopy  03/28/2000    DEXA (modify frequency per FRAX score)  03/28/2005    Pneumococcal 65+ years Vaccine (1 of 1 - PPSV23) 03/28/2015    Annual Wellness Visit (AWV)  05/29/2019    Flu vaccine (1) 09/01/2020    A1C test (Diabetic or Prediabetic)  04/06/2021    Lipid screen  04/06/2021    Potassium monitoring  04/06/2021    Creatinine monitoring  04/06/2021    Hepatitis A vaccine  Aged Out    Hepatitis B vaccine  Aged Out    Hib vaccine  Aged Out    Meningococcal (ACWY) vaccine  Aged Out     Recommendations for [place] Due: see orders and patient instructions/AVS.  .   Recommended screening schedule for the next 5-10 years is provided to the patient in written form: see Patient Instructions/AVS    Chinyere Spain is a 79 y.o. female being evaluated by a Virtual Visit (video visit) encounter to address concerns as mentioned above. A caregiver was present when appropriate. Due to this being a TeleHealth encounter (During OJJNU-75 public health emergency), evaluation of the following organ systems was limited: Vitals/Constitutional/EENT/Resp/CV/GI//MS/Neuro/Skin/Heme-Lymph-Imm. Pursuant to the emergency declaration under the [address] [place] [address], [address] [place] [address] and the [employer] Act, this Virtual Visit was conducted with patient's (and/or legal guardian's) consent, to reduce the patient's risk of exposure to COVID-19 and provide necessary medical care. The patient (and/or legal guardian) has also been advised to contact this office for worsening conditions or problems, and seek emergency medical treatment and/or call 911 if deemed necessary. Patient identification was verified at the start of the visit: Yes    Total time spent for this encounter: 10 minutes    Services were provided through a video synchronous discussion virtually to substitute for in-person clinic visit. Patient and provider were located at their individual homes. --Sharon Almazan RN on 8/12/2020 at 11:56 AM    An electronic signature was used to authenticate this note. This encounter was performed under Angelo latham DOs, direct supervision, 8/12/2020.

## 2020-08-12 NOTE — PATIENT INSTRUCTIONS
diet is one that limits sodium , certain types of fat , and cholesterol . This type of diet is recommended for:   People with any form of cardiovascular disease (eg, coronary heart disease , peripheral vascular disease , previous heart attack , previous stroke )   People with risk factors for cardiovascular disease, such as high blood pressure , high cholesterol , or diabetes   Anyone who wants to lower their risk of developing cardiovascular disease   Sodium    Sodium is a mineral found in many foods. In general, most people consume much more sodium than they need. Diets high in sodium can increase blood pressure and lead to edema (water retention). On a heart-healthy diet, you should consume no more than 2,300 mg (milligrams) of sodium per dayabout the amount in one teaspoon of table salt. The foods highest in sodium include table salt (about 50% sodium), processed foods, convenience foods, and preserved foods. Cholesterol    Cholesterol is a fat-like, waxy substance in your blood. Our bodies make some cholesterol. It is also found in animal products, with the highest amounts in fatty meat, egg yolks, whole milk, cheese, shellfish, and organ meats. On a heart-healthy diet, you should limit your cholesterol intake to less than 200 mg per day. It is normal and important to have some cholesterol in your bloodstream. But too much cholesterol can cause plaque to build up within your arteries, which can eventually lead to a heart attack or stroke. The two types of cholesterol that are most commonly referred to are:   Low-density lipoprotein (LDL) cholesterol  Also known as bad cholesterol, this is the cholesterol that tends to build up along your arteries. Bad cholesterol levels are increased by eating fats that are saturated or hydrogenated. Optimal level of this cholesterol is less than 100. Over 130 starts to get risky for heart disease.    High-density lipoprotein (HDL) cholesterol  Also known as good cholesterol, this type of cholesterol actually carries cholesterol away from your arteries and may, therefore, help lower your risk of having a heart attack. You want this level to be high (ideally greater than 60). It is a risk to have a level less than 40. You can raise this good cholesterol by eating olive oil, canola oil, avocados, or nuts. Exercise raises this level, too. Fat    Fat is calorie dense and packs a lot of calories into a small amount of food. Even though fats should be limited due to their high calorie content, not all fats are bad. In fact, some fats are quite healthful. Fat can be broken down into four main types. The good-for-you fats are:   Monounsaturated fat  found in oils such as olive and canola, avocados, and nuts and natural nut butters; can decrease cholesterol levels, while keeping levels of HDL cholesterol high   Polyunsaturated fat  found in oils such as safflower, sunflower, soybean, corn, and sesame; can decrease total cholesterol and LDL cholesterol   Omega-3 fatty acids  particularly those found in fatty fish (such as salmon, trout, tuna, mackerel, herring, and sardines); can decrease risk of arrhythmias, decrease triglyceride levels, and slightly lower blood pressure   The fats that you want to limit are:   Saturated fat  found in animal products, many fast foods, and a few vegetables; increases total blood cholesterol, including LDL levels   Animal fats that are saturated include: butter, lard, whole-milk dairy products, meat fat, and poultry skin   Vegetable fats that are saturated include: hydrogenated shortening, palm oil, coconut oil, cocoa butter   Hydrogenated or trans fat  found in margarine and vegetable shortening, most shelf stable snack foods, and fried foods; increases LDL and decreases HDL     It is generally recommended that you limit your total fat for the day to less than 30% of your total calories.  If you follow an 1800-calorie heart healthy diet, for example, this would mean 60 grams of fat or less per day. Saturated fat and trans fat in your diet raises your blood cholesterol the most, much more than dietary cholesterol does. For this reason, on a heart-healthy diet, less than 7% of your calories should come from saturated fat and ideally 0% from trans fat. On an 1800-calorie diet, this translates into less than 14 grams of saturated fat per day, leaving 46 grams of fat to come from mono- and polyunsaturated fats.    Food Choices on a Heart Healthy Diet   Food Category   Foods Recommended   Foods to Avoid   Grains   Breads and rolls without salted tops Most dry and cooked cereals Unsalted crackers and breadsticks Low-sodium or homemade breadcrumbs or stuffing All rice and pastas   Breads, rolls, and crackers with salted tops High-fat baked goods (eg, muffins, donuts, pastries) Quick breads, self-rising flour, and biscuit mixes Regular bread crumbs Instant hot cereals Commercially prepared rice, pasta, or stuffing mixes   Vegetables   Most fresh, frozen, and low-sodium canned vegetables Low-sodium and salt-free vegetable juices Canned vegetables if unsalted or rinsed   Regular canned vegetables and juices, including sauerkraut and pickled vegetables Frozen vegetables with sauces Commercially prepared potato and vegetable mixes   Fruits   Most fresh, frozen, and canned fruits All fruit juices   Fruits processed with salt or sodium   Milk   Nonfat or low-fat (1%) milk Nonfat or low-fat yogurt Cottage cheese, low-fat ricotta, cheeses labeled as low-fat and low-sodium   Whole milk Reduced-fat (2%) milk Malted and chocolate milk Full fat yogurt Most cheeses (unless low-fat and low salt) Buttermilk (no more than 1 cup per week)   Meats and Beans   Lean cuts of fresh or frozen beef, veal, lamb, or pork (look for the word loin) Fresh or frozen poultry without the skin Fresh or frozen fish and some shellfish Egg whites and egg substitutes (Limit whole eggs to three per week) Tofu Nuts or seeds (unsalted, dry-roasted), low-sodium peanut butter Dried peas, beans, and lentils   Any smoked, cured, salted, or canned meat, fish, or poultry (including garcia, chipped beef, cold cuts, hot dogs, sausages, sardines, and anchovies) Poultry skins Breaded and/or fried fish or meats Canned peas, beans, and lentils Salted nuts   Fats and Oils   Olive oil and canola oil Low-sodium, low-fat salad dressings and mayonnaise   Butter, margarine, coconut and palm oils, garcia fat   Snacks, Sweets, and Condiments   Low-sodium or unsalted versions of broths, soups, soy sauce, and condiments Pepper, herbs, and spices; vinegar, lemon, or lime juice Low-fat frozen desserts (yogurt, sherbet, fruit bars) Sugar, cocoa powder, honey, syrup, jam, and preserves Low-fat, trans-fat free cookies, cakes, and pies Favio and animal crackers, fig bars, jeanie snaps   High-fat desserts Broth, soups, gravies, and sauces, made from instant mixes or other high-sodium ingredients Salted snack foods Canned olives Meat tenderizers, seasoning salt, and most flavored vinegars   Beverages   Low-sodium carbonated beverages Tea and coffee in moderation Soy milk   Commercially softened water   Suggestions   Make whole grains, fruits, and vegetables the base of your diet. Choose heart-healthy fats such as canola, olive, and flaxseed oil, and foods high in heart-healthy fats, such as nuts, seeds, soybeans, tofu, and fish. Eat fish at least twice per week; the fish highest in omega-3 fatty acids and lowest in mercury include salmon, herring, mackerel, sardines, and canned chunk light tuna. If you eat fish less than twice per week or have high triglycerides, talk to your doctor about taking fish oil supplements. Read food labels.    For products low in fat and cholesterol, look for fat free, low-fat, cholesterol free, saturated fat free, and trans fat freeAlso scan the Nutrition Facts Label, which lists saturated fat, trans fat, and cholesterol amounts. For products low in sodium, look for sodium free, very low sodium, low sodium, no added salt, and unsalted   Skip the salt when cooking or at the table; if food needs more flavor, get creative and try out different herbs and spices. Garlic and onion also add substantial flavor to foods. Trim any visible fat off meat and poultry before cooking, and drain the fat off after randolph. Use cooking methods that require little or no added fat, such as grilling, boiling, baking, poaching, broiling, roasting, steaming, stir-frying, and sauting. Avoid fast food and convenience food. They tend to be high in saturated and trans fat and have a lot of added salt. Talk to a registered dietitian for individualized diet advice. Last Reviewed: March 2011 Xuan Keen MS, MPH, RD   Updated: 3/29/2011   ·     DASH Diet: After Your Visit  Your Care Instructions  The DASH diet is an eating plan that can help lower your blood pressure. DASH stands for Dietary Approaches to Stop Hypertension. Hypertension is high blood pressure. The DASH diet focuses on eating foods that are high in calcium, potassium, and magnesium. These nutrients can lower blood pressure. The foods that are highest in these nutrients are fruits, vegetables, low-fat dairy products, nuts, seeds, and legumes. But taking calcium, potassium, and magnesium supplements instead of eating foods that are high in those nutrients does not have the same effect. The DASH diet also includes whole grains, fish, and poultry. The DASH diet is one of several lifestyle changes your doctor may recommend to lower your high blood pressure. Your doctor may also want you to decrease the amount of sodium in your diet. Lowering sodium while following the DASH diet can lower blood pressure even further than just the DASH diet alone. Follow-up care is a key part of your treatment and safety.  Be sure to make and go to all appointments, and call your a snack or healthy dessert. Add lettuce, tomato, cucumber, and onion to sandwiches. Combine a ready-made pizza crust with low-fat mozzarella cheese and lots of vegetable toppings. Try using tomatoes, squash, spinach, broccoli, carrots, cauliflower, and onions. Have a variety of cut-up vegetables with a low-fat dip as an appetizer instead of chips and dip. Sprinkle sunflower seeds or chopped almonds over salads. Or try adding chopped walnuts or almonds to cooked vegetables. Try some vegetarian meals using beans and peas. Add garbanzo or kidney beans to salads. Make burritos and tacos with mashed peña beans or black beans    © 1298-8276 [employer], [employer]. Care instructions adapted under license by [facility]. This care instruction is for use with your licensed healthcare professional. If you have questions about a medical condition or this instruction, always ask your healthcare professional. [place] [address] any warranty or liability for your use of this information. Content Version: 9.4.83526; Last Revised: March 15, 2012              ·     Keep Your Memory Phillip Corpus       Many factors can affect your ability to remembera hectic lifestyle, aging, stress, chronic disease, and certain medicines. But, there are steps you can take to sharpen your mind and help preserve your memory. Challenge Your Brain   Regularly challenging your mind may help keeps it in top shape. Good mental exercises include:   Crossword puzzlesUse a dictionary if you need it; you will learn more that way. Brainteasers Try some! Crafts, such as wood working and sewing   Hobbies, such as gardening and building model airplanes   SocializingVisit old friends or join groups to meet new ones.    Reading   Learning a new language   Taking a class, whether it be art history or wiliam chi   TravelingExperience the food, history, and culture of your destination   Learning to use a computer   Going to [employer], the theater, or thought-provoking movies   Changing things in your daily life, such as reversing your pattern in the grocery store or brushing your teeth using your nondominant hand   Use Memory Aids   There is no need to remember every detail on your own. These memory aids can help:   Calendars and day planners   Electronic organizers to store all sorts of helpful informationThese devices can \"beep\" to remind you of appointments. A book of days to record birthdays, anniversaries, and other occasions that occur on the same date every year   Detailed \"to-do\" lists and strategically placed sticky notes   Quick \"study\" sessionsBefore a gathering, review who will be there so their names will be fresh in your mind. Establish routinesFor example, keep your keys, wallet, and umbrella in the same place all the time or take medicine with your 8:00 AM glass of juice   Live a Healthy Life   Many actions that will keep your body strong will do the same for your mind. For example:   Talk to Your Doctor About Herbs and Supplements    Malnutrition and vitamin deficiencies can impair your mental function. For example, vitamin B12 deficiency can cause a range of symptoms, including confusion. But, what if your nutritional needs are being met? Can herbs and supplements still offer a benefit? Researchers have investigated a range of natural remedies, such as ginkgo , ginseng , and the supplement phosphatidylserine (PS). So far, though, the evidence is inconsistent as to whether these products can improve memory or thinking. If you are interested in taking herbs and supplements, talk to your doctor first because they may interact with other medicines that you are taking. Exercise Regularly    Among the many benefits of regular exercise are increased blood flow to the brain and decreased risk of certain diseases that can interfere with memory function. One study found that even moderate exercise has a beneficial effect.  Examples of \"moderate\" exercise include:   Playing 18 holes of golf once a week, without a cart   Playing tennis twice a week   Walking one mile per day   Manage Stress    It can be tough to remember what is important when your mind is cluttered. Make time for relaxation. Choose activities that calm you down, and make it routine. Manage Chronic Conditions    Side effects of high blood pressure , diabetes, and heart disease can interfere with mental function. Many of the lifestyle steps discussed here can help manage these conditions. Strive to eat a healthy diet, exercise regularly, get stress under control, and follow your doctor's advice for your condition. Minimize Medications    Talk to your doctor about the medicines that you take. Some may be unnecessary. Also, healthy lifestyle habits may lower the need for certain drugs. Last Reviewed: April 2010 Rafael Hernandez MD   Updated: 4/13/2010   ·   Personalized Preventive Plan for [facility] - 8/12/2020  Medicare offers a range of preventive health benefits. Some of the tests and screenings are paid in full while other may be subject to a deductible, co-insurance, and/or copay. Some of these benefits include a comprehensive review of your medical history including lifestyle, illnesses that may run in your family, and various assessments and screenings as appropriate. After reviewing your medical record and screening and assessments performed today your provider may have ordered immunizations, labs, imaging, and/or referrals for you. A list of these orders (if applicable) as well as your Preventive Care list are included within your After Visit Summary for your review. Other Preventive Recommendations:    A preventive eye exam performed by an eye specialist is recommended every 1-2 years to screen for glaucoma; cataracts, macular degeneration, and other eye disorders. A preventive dental visit is recommended every 6 months.   Try to get at least 150 minutes of exercise per week or 10,000 steps per day on a pedometer . Order or download the FREE \"Exercise & Physical Activity: Your Everyday Guide\" from [employer]. Call 2-319.174.6731 or search [employer] online. You need 5042-9990 mg of calcium and 5057-9651 IU of vitamin D per day. It is possible to meet your calcium requirement with diet alone, but a vitamin D supplement is usually necessary to meet this goal.  When exposed to the sun, use a sunscreen that protects against both UVA and UVB radiation with an SPF of 30 or greater. Reapply every 2 to 3 hours or after sweating, drying off with a towel, or swimming. Always wear a seat belt when traveling in a car. Always wear a helmet when riding a bicycle or motorcycle.

## 2020-08-21 ENCOUNTER — OFFICE VISIT (OUTPATIENT)
Dept: INTERNAL MEDICINE CLINIC | Age: 70
End: 2020-08-21
Payer: MEDICARE

## 2020-08-21 VITALS
BODY MASS INDEX: 32.52 KG/M2 | TEMPERATURE: 98.1 F | WEIGHT: 195.4 LBS | HEART RATE: 68 BPM | SYSTOLIC BLOOD PRESSURE: 128 MMHG | DIASTOLIC BLOOD PRESSURE: 74 MMHG | OXYGEN SATURATION: 98 %

## 2020-08-21 DIAGNOSIS — E11.9 TYPE 2 DIABETES MELLITUS WITHOUT COMPLICATION, WITHOUT LONG-TERM CURRENT USE OF INSULIN (HCC): ICD-10-CM

## 2020-08-21 PROCEDURE — G8400 PT W/DXA NO RESULTS DOC: HCPCS | Performed by: INTERNAL MEDICINE

## 2020-08-21 PROCEDURE — 1123F ACP DISCUSS/DSCN MKR DOCD: CPT | Performed by: INTERNAL MEDICINE

## 2020-08-21 PROCEDURE — 1036F TOBACCO NON-USER: CPT | Performed by: INTERNAL MEDICINE

## 2020-08-21 PROCEDURE — 4040F PNEUMOC VAC/ADMIN/RCVD: CPT | Performed by: INTERNAL MEDICINE

## 2020-08-21 PROCEDURE — 3017F COLORECTAL CA SCREEN DOC REV: CPT | Performed by: INTERNAL MEDICINE

## 2020-08-21 PROCEDURE — 1090F PRES/ABSN URINE INCON ASSESS: CPT | Performed by: INTERNAL MEDICINE

## 2020-08-21 PROCEDURE — 2022F DILAT RTA XM EVC RTNOPTHY: CPT | Performed by: INTERNAL MEDICINE

## 2020-08-21 PROCEDURE — 99214 OFFICE O/P EST MOD 30 MIN: CPT | Performed by: INTERNAL MEDICINE

## 2020-08-21 PROCEDURE — 3044F HG A1C LEVEL LT 7.0%: CPT | Performed by: INTERNAL MEDICINE

## 2020-08-21 PROCEDURE — G8427 DOCREV CUR MEDS BY ELIG CLIN: HCPCS | Performed by: INTERNAL MEDICINE

## 2020-08-21 PROCEDURE — G8417 CALC BMI ABV UP PARAM F/U: HCPCS | Performed by: INTERNAL MEDICINE

## 2020-08-21 RX ORDER — PREDNISONE 10 MG/1
TABLET ORAL
Qty: 30 TABLET | Refills: 0 | Status: SHIPPED | OUTPATIENT
Start: 2020-08-21 | End: 2021-01-06 | Stop reason: SDUPTHER

## 2020-08-21 RX ORDER — DULOXETIN HYDROCHLORIDE 30 MG/1
90 CAPSULE, DELAYED RELEASE ORAL DAILY
Qty: 270 CAPSULE | Refills: 3 | Status: SHIPPED | OUTPATIENT
Start: 2020-08-21 | End: 2021-02-26 | Stop reason: SDUPTHER

## 2020-08-21 ASSESSMENT — ENCOUNTER SYMPTOMS
CHEST TIGHTNESS: 0
DIARRHEA: 0
BACK PAIN: 1
CONSTIPATION: 0
SHORTNESS OF BREATH: 0

## 2020-08-21 NOTE — ASSESSMENT & PLAN NOTE
Has not followed with [address] [place] [address]. Has not responded to surgery or epidurals for her low back. Refer to pain management at [facility] for treatment options, she has been resistant to pain medications d/t son's history of substance abuse. Unable to take NSAID's d/t CKD. Continue with Voltaren gel prn. Has had relief with prednisone in the past. Will give her a prescription for prednisone to take prn.

## 2020-08-21 NOTE — PROGRESS NOTES
Patient: Chinyere Spain is a 79 y.o. female who presents today with the following Chief Complaint(s):  Chief Complaint   Patient presents with    Check-Up       HPI     Here today for depression. Is attributing to her pain. Has tried epidurals, spinal stimulator, back surgery. Has pain in her neck and her low back. Voltaren gel does help her neck and hands. Can no longer take NSAID's d/t CKD. Is also worried about her memory. Notices that she forgets names but they eventually come back to her. Her friends are similar to her. Is not misplacing things. Is not forgetting medications, forgetting to pay bills. No issues with blood pressure, blood sugars. No side effects with amlodipine, Micardis, or Crestor. Does need to have blood work done. Patient states that she is sleeping well with the combination of CPAP and gabapentin. Does not feel that increasing the dose of gabapentin will be helpful for her pain.         Allergies   Allergen Reactions    Compazine [Prochlorperazine Maleate] Shortness Of Breath     dyspnea    Morphine Nausea And Vomiting     N&V      Past Medical History:   Diagnosis Date    Back pain, chronic     Depression     Joint pain     Sleep apnea       Past Surgical History:   Procedure Laterality Date    LUMBAR FUSION  2016    PARTIAL HYSTERECTOMY  2002    SPINAL CORD STIMULATOR SURGERY      Transmit receive head coil only      Social History     Socioeconomic History    Marital status:      Spouse name: Not on file    Number of children: Not on file    Years of education: Not on file    Highest education level: Not on file   Occupational History    Occupation: retired   Social Needs    Financial resource strain: Not hard at all   Shawanda-Angelica insecurity     Worry: Never true     Inability: Never true   [employer] needs     Medical: No     Non-medical: No   Tobacco Use    Smoking status: Never Smoker    Smokeless tobacco: Never Used   Substance and Sexual Activity    Alcohol use: No    Drug use: No    Sexual activity: Not on file   Lifestyle    Physical activity     Days per week: Not on file     Minutes per session: Not on file    Stress: Not on file   Relationships    Social connections     Talks on phone: Not on file     Gets together: Not on file     Attends Protestant service: Not on file     Active member of club or organization: Not on file     Attends meetings of clubs or organizations: Not on file     Relationship status: Not on file    Intimate partner violence     Fear of current or ex partner: Not on file     Emotionally abused: Not on file     Physically abused: Not on file     Forced sexual activity: Not on file   Other Topics Concern    Not on file   Social History Narrative    Not on file     Family History   Problem Relation Age of Onset    Heart Failure Mother     COPD Father         Outpatient Medications Prior to Visit   Medication Sig Dispense Refill    telmisartan (MICARDIS) 80 MG tablet TAKE 1 TABLET DAILY 90 tablet 3    TOPROL  MG extended release tablet TAKE 1 TABLET TWICE A  tablet 3    gabapentin (NEURONTIN) 300 MG capsule Take 1 capsule by mouth nightly for 180 days. 1 po qhs x 3 days -> 2 po qhs x 3 days -> 3 po qhs 90 capsule 1    vitamin D (ERGOCALCIFEROL) 1.25 MG (05277 UT) CAPS capsule TAKE 1 CAPSULE ONCE A WEEK 12 capsule 4    rosuvastatin (CRESTOR) 10 MG tablet TAKE 1 TABLET NIGHTLY 90 tablet 4    amLODIPine (NORVASC) 10 MG tablet TAKE 1 TABLET DAILY 90 tablet 4    omeprazole (PRILOSEC) 20 MG delayed release capsule TAKE 1 CAPSULE DAILY (NEW DOSE) 90 capsule 3    niacin 500 MG extended release capsule Take 500 mg by mouth nightly      Magnesium Hydroxide (MAGNESIA PO) Take 50 mg by mouth daily       CPAP Machine MISC by Does not apply route      DULoxetine (CYMBALTA) 30 MG extended release capsule TAKE 1 CAPSULE DAILY 90 capsule 3     No facility-administered medications prior to visit. Patient'spast medical history, surgical history, family history, medications,  and allergies  were all reviewed and updated as appropriate today. Review of Systems   Constitutional: Negative for appetite change, fatigue and fever. Respiratory: Negative for chest tightness and shortness of breath. Cardiovascular: Negative for chest pain. Gastrointestinal: Negative for constipation and diarrhea. Musculoskeletal: Positive for back pain and neck pain. Skin: Negative for rash. Psychiatric/Behavioral: Positive for dysphoric mood. Negative for sleep disturbance. The patient is not nervous/anxious. /74   Pulse 68   Temp 98.1 °F (36.7 °C)   Wt 195 lb 6.4 oz (88.6 kg)   SpO2 98%   BMI 32.52 kg/m²   Physical Exam  Vitals signs and nursing note reviewed. Constitutional:       Appearance: She is well-developed. She is not toxic-appearing. HENT:      Head: Normocephalic. Right Ear: Tympanic membrane, ear canal and external ear normal.      Left Ear: Tympanic membrane, ear canal and external ear normal.   Eyes:      Extraocular Movements: Extraocular movements intact. Conjunctiva/sclera: Conjunctivae normal.      Pupils: Pupils are equal, round, and reactive to light. Neck:      Thyroid: No thyroid mass or thyromegaly. Vascular: No carotid bruit. Cardiovascular:      Rate and Rhythm: Normal rate and regular rhythm. Pulses:           Dorsalis pedis pulses are 2+ on the right side and 2+ on the left side. Heart sounds: Normal heart sounds. No murmur. Comments: No LE edema  Pulmonary:      Effort: Pulmonary effort is normal.      Breath sounds: Normal breath sounds. Lymphadenopathy:      Cervical: No cervical adenopathy. Neurological:      General: No focal deficit present. Mental Status: She is alert and oriented to person, place, and time.    Psychiatric:         Mood and Affect: Mood normal.         Behavior: Behavior normal. Behavior is cooperative. ASSESSMENT/PLAN:    Problem List Items Addressed This Visit     Anxiety and depression     Likely due to pain. Increase Cymbalta to 90 mg qd. Recommend counseling and she is agreeable. Recommend checking with pain management to see if there are any psychologists who deal specifically with chronic pain. Relevant Medications    DULoxetine (CYMBALTA) 30 MG extended release capsule    Cervical radiculopathy - Primary     Has not followed with [facility]. Has not responded to surgery or epidurals for her low back. Refer to pain management at [facility] for treatment options, she has been resistant to pain medications d/t son's history of substance abuse. Unable to take NSAID's d/t CKD. Continue with Voltaren gel prn. Has had relief with prednisone in the past. Will give her a prescription for prednisone to take prn. Relevant Medications    DULoxetine (CYMBALTA) 30 MG extended release capsule    predniSONE (DELTASONE) 10 MG tablet    Other Relevant Orders    Kash Sigala MD, Orthopedic Surgery, [facility] [place]    DDD (degenerative disc disease), lumbar     Minimal improvement with surgery and spinal stimulator. Has not had relief with epidurals in the past. Is asking to see pain management. Has been resistant to pain medications d/t her son's history of substance abuse (clean x 9 years). Refer to pain management to discuss treatment options. MRI's of low back were done through [facility]. Relevant Medications    predniSONE (DELTASONE) 10 MG tablet    Other Relevant Orders    Kash Sigala MD, Orthopedic Surgery, [facility] [place]    Essential hypertension     Well controlled. Remains amlodipine 10 mg qd, Micardis 80 mg qd, metoprolol  mg BID         Hyperlipidemia LDL goal <100     Continue Crestor 10 mg daily.          Relevant Orders    Lipid Panel    TSH with Reflex    Memory loss or impairment     MOCA in February 2019 had a score of 26 out of 30. Suspect some of her memory impairment may be related to pain and depression. Would like to repeat MOCA after pain is under better control. Type 2 diabetes mellitus without complication, without long-term current use of insulin (HCC)     Diet controlled. Check hemoglobin A1c. Relevant Orders    HEMOGLOBIN A1C    COMPREHENSIVE METABOLIC PANEL    Comprehensive Metabolic Panel    Hemoglobin A1C    Microalbumin / Creatinine Urine Ratio    Vitamin D deficiency     Check vitamin D level. On weekly ergocalciferol. Relevant Orders    Vitamin D 25 Hydroxy          Current Outpatient Medications   Medication Sig Dispense Refill    DULoxetine (CYMBALTA) 30 MG extended release capsule Take 3 capsules by mouth daily 270 capsule 3    predniSONE (DELTASONE) 10 MG tablet 4 po qd x 3 days -> 3 po qd x 3 days -> 2 po qd x 3 days -> 1 po qd x 3 days 30 tablet 0    telmisartan (MICARDIS) 80 MG tablet TAKE 1 TABLET DAILY 90 tablet 3    TOPROL  MG extended release tablet TAKE 1 TABLET TWICE A  tablet 3    gabapentin (NEURONTIN) 300 MG capsule Take 1 capsule by mouth nightly for 180 days. 1 po qhs x 3 days -> 2 po qhs x 3 days -> 3 po qhs 90 capsule 1    vitamin D (ERGOCALCIFEROL) 1.25 MG (82328 UT) CAPS capsule TAKE 1 CAPSULE ONCE A WEEK 12 capsule 4    rosuvastatin (CRESTOR) 10 MG tablet TAKE 1 TABLET NIGHTLY 90 tablet 4    amLODIPine (NORVASC) 10 MG tablet TAKE 1 TABLET DAILY 90 tablet 4    omeprazole (PRILOSEC) 20 MG delayed release capsule TAKE 1 CAPSULE DAILY (NEW DOSE) 90 capsule 3    niacin 500 MG extended release capsule Take 500 mg by mouth nightly      Magnesium Hydroxide (MAGNESIA PO) Take 50 mg by mouth daily       CPAP Machine MISC by Does not apply route       No current facility-administered medications for this visit. Return in about 4 months (around 12/21/2020) for labs prior.

## 2020-08-21 NOTE — ASSESSMENT & PLAN NOTE
MOCA in February 2019 had a score of 26 out of 30. Suspect some of her memory impairment may be related to pain and depression. Would like to repeat MOCA after pain is under better control.

## 2020-08-21 NOTE — ASSESSMENT & PLAN NOTE
Minimal improvement with surgery and spinal stimulator. Has not had relief with epidurals in the past. Is asking to see pain management. Has been resistant to pain medications d/t her son's history of substance abuse (clean x 9 years). Refer to pain management to discuss treatment options. MRI's of low back were done through [facility].

## 2020-08-21 NOTE — ASSESSMENT & PLAN NOTE
Likely due to pain. Increase Cymbalta to 90 mg qd. Recommend counseling and she is agreeable. Recommend checking with pain management to see if there are any psychologists who deal specifically with chronic pain.

## 2020-08-22 LAB
A/G RATIO: 1.4 (ref 1.1–2.2)
ALBUMIN SERPL-MCNC: 4.4 G/DL (ref 3.4–5)
ALP BLD-CCNC: 79 U/L (ref 40–129)
ALT SERPL-CCNC: 13 U/L (ref 10–40)
ANION GAP SERPL CALCULATED.3IONS-SCNC: 14 MMOL/L (ref 3–16)
AST SERPL-CCNC: 17 U/L (ref 15–37)
BILIRUB SERPL-MCNC: 0.7 MG/DL (ref 0–1)
BUN BLDV-MCNC: 20 MG/DL (ref 7–20)
CALCIUM SERPL-MCNC: 10.4 MG/DL (ref 8.3–10.6)
CHLORIDE BLD-SCNC: 106 MMOL/L (ref 99–110)
CO2: 24 MMOL/L (ref 21–32)
CREAT SERPL-MCNC: 1.2 MG/DL (ref 0.6–1.2)
ESTIMATED AVERAGE GLUCOSE: 142.7 MG/DL
GFR AFRICAN AMERICAN: 54
GFR NON-AFRICAN AMERICAN: 44
GLOBULIN: 3.1 G/DL
GLUCOSE BLD-MCNC: 114 MG/DL (ref 70–99)
HBA1C MFR BLD: 6.6 %
POTASSIUM SERPL-SCNC: 4.6 MMOL/L (ref 3.5–5.1)
SODIUM BLD-SCNC: 144 MMOL/L (ref 136–145)
TOTAL PROTEIN: 7.5 G/DL (ref 6.4–8.2)

## 2020-09-10 DIAGNOSIS — E55.9 VITAMIN D DEFICIENCY: ICD-10-CM

## 2020-09-10 DIAGNOSIS — E11.9 TYPE 2 DIABETES MELLITUS WITHOUT COMPLICATION, WITHOUT LONG-TERM CURRENT USE OF INSULIN (HCC): ICD-10-CM

## 2020-09-10 DIAGNOSIS — E78.5 HYPERLIPIDEMIA LDL GOAL <100: ICD-10-CM

## 2020-09-10 LAB
A/G RATIO: 1.4 (ref 1.1–2.2)
ALBUMIN SERPL-MCNC: 4.1 G/DL (ref 3.4–5)
ALP BLD-CCNC: 61 U/L (ref 40–129)
ALT SERPL-CCNC: 11 U/L (ref 10–40)
ANION GAP SERPL CALCULATED.3IONS-SCNC: 12 MMOL/L (ref 3–16)
AST SERPL-CCNC: 21 U/L (ref 15–37)
BILIRUB SERPL-MCNC: 0.5 MG/DL (ref 0–1)
BUN BLDV-MCNC: 18 MG/DL (ref 7–20)
CALCIUM SERPL-MCNC: 9.7 MG/DL (ref 8.3–10.6)
CHLORIDE BLD-SCNC: 107 MMOL/L (ref 99–110)
CHOLESTEROL, TOTAL: 144 MG/DL (ref 0–199)
CO2: 21 MMOL/L (ref 21–32)
CREAT SERPL-MCNC: 1.2 MG/DL (ref 0.6–1.2)
CREATININE URINE: 130.6 MG/DL (ref 28–259)
ESTIMATED AVERAGE GLUCOSE: 139.9 MG/DL
GFR AFRICAN AMERICAN: 54
GFR NON-AFRICAN AMERICAN: 44
GLOBULIN: 3 G/DL
GLUCOSE BLD-MCNC: 107 MG/DL (ref 70–99)
HBA1C MFR BLD: 6.5 %
HDLC SERPL-MCNC: 41 MG/DL (ref 40–60)
LDL CHOLESTEROL CALCULATED: 64 MG/DL
MICROALBUMIN UR-MCNC: 3.8 MG/DL
MICROALBUMIN/CREAT UR-RTO: 29.1 MG/G (ref 0–30)
POTASSIUM SERPL-SCNC: 4.6 MMOL/L (ref 3.5–5.1)
SODIUM BLD-SCNC: 140 MMOL/L (ref 136–145)
TOTAL PROTEIN: 7.1 G/DL (ref 6.4–8.2)
TRIGL SERPL-MCNC: 195 MG/DL (ref 0–150)
TSH REFLEX: 1.33 UIU/ML (ref 0.27–4.2)
VITAMIN D 25-HYDROXY: 46.6 NG/ML
VLDLC SERPL CALC-MCNC: 39 MG/DL

## 2020-10-09 ENCOUNTER — TELEPHONE (OUTPATIENT)
Dept: INTERNAL MEDICINE CLINIC | Age: 70
End: 2020-10-09

## 2020-10-09 RX ORDER — OMEPRAZOLE 20 MG/1
CAPSULE, DELAYED RELEASE ORAL
Qty: 90 CAPSULE | Refills: 3 | Status: SHIPPED | OUTPATIENT
Start: 2020-10-09 | End: 2021-02-26 | Stop reason: SDUPTHER

## 2020-10-09 NOTE — TELEPHONE ENCOUNTER
Pt called asking for refills on:  Omeprazole 20 mg   1 qd PRN    #90    [facility]     Next follow up 02/26/2020.      Pended refill

## 2020-10-26 RX ORDER — GABAPENTIN 100 MG/1
CAPSULE ORAL
Qty: 270 CAPSULE | Refills: 3 | Status: SHIPPED | OUTPATIENT
Start: 2020-10-26 | End: 2021-02-26 | Stop reason: SDUPTHER

## 2020-11-05 ENCOUNTER — NURSE TRIAGE (OUTPATIENT)
Dept: OTHER | Facility: CLINIC | Age: 70
End: 2020-11-05

## 2020-11-05 NOTE — TELEPHONE ENCOUNTER
CALL RECEIVED FROM: Brisa  Abdominal pain    PATIENT STATES:   Abdominal pain, high in abdomen, has the H-pylori ulcer. Pain goes around back, happens more at night, takes mylanta. Has treated with antibiotics in past.     PROTOCOL RECOMMENDATION: See today or tomorrow, care advice given and understood. TRANSFER TO: Lupillo Lucio [facility]  PLEASE DO NOT RESPOND TO THIS TRIAGE NOTE THROUGH THIS ENCOUNTER. ANY SUBSEQUENT COMMUNICATION SHOULD BE DIRECTLY WITH THE PATIENT. Reason for Disposition   MILD pain that comes and goes (cramps) lasts > 24 hours    Answer Assessment - Initial Assessment Questions  1. LOCATION: \"Where does it hurt? \"       At the top of the stomach  2. RADIATION: \"Does the pain shoot anywhere else? \" (e.g., chest, back)      Starts high in abdomen, spreads around back. 3. ONSET: \"When did the pain begin? \" (e.g., minutes, hours or days ago)       3-4 days ago  4. SUDDEN: \"Gradual or sudden onset? \"      gradual  5. PATTERN \"Does the pain come and go, or is it constant? \"     - If constant: \"Is it getting better, staying the same, or worsening? \"       (Note: Constant means the pain never goes away completely; most serious pain is constant and it progresses)      - If intermittent: \"How long does it last?\" \"Do you have pain now? \"      (Note: Intermittent means the pain goes away completely between bouts)      Comes and goes, seems like it is worse in the evening. 6. SEVERITY: \"How bad is the pain? \"  (e.g., Scale 1-10; mild, moderate, or severe)     - MILD (1-3): doesn't interfere with normal activities, abdomen soft and not tender to touch      - MODERATE (4-7): interferes with normal activities or awakens from sleep, tender to touch      - SEVERE (8-10): excruciating pain, doubled over, unable to do any normal activities        When the pain is occurring it is 8/10. Takes mylanta and tums   7. RECURRENT SYMPTOM: \"Have you ever had this type of abdominal pain before? \" If so, ask: \"When was the last time? \" and \"What happened that time? \"       Yes, H-Pylori ulcer, has been several years ago. 8. AGGRAVATING FACTORS: \"Does anything seem to cause this pain? \" (e.g., foods, stress, alcohol)      Eating food helps it. 9. CARDIAC SYMPTOMS: \"Do you have any of the following symptoms: chest pain, difficulty breathing, sweating, nausea? \"      none  10. OTHER SYMPTOMS: \"Do you have any other symptoms? \" (e.g., fever, vomiting, diarrhea)        none  11. PREGNANCY: \"Is there any chance you are pregnant? \" \"When was your last menstrual period? \"        na    Protocols used: ABDOMINAL PAIN - UPPER-ADULT-OH

## 2020-11-17 ENCOUNTER — OFFICE VISIT (OUTPATIENT)
Dept: ORTHOPEDIC SURGERY | Age: 70
End: 2020-11-17
Payer: MEDICARE

## 2020-11-17 VITALS — BODY MASS INDEX: 32.49 KG/M2 | HEIGHT: 65 IN | WEIGHT: 195 LBS | TEMPERATURE: 97 F

## 2020-11-17 PROCEDURE — G8427 DOCREV CUR MEDS BY ELIG CLIN: HCPCS | Performed by: ORTHOPAEDIC SURGERY

## 2020-11-17 PROCEDURE — 1090F PRES/ABSN URINE INCON ASSESS: CPT | Performed by: ORTHOPAEDIC SURGERY

## 2020-11-17 PROCEDURE — 99203 OFFICE O/P NEW LOW 30 MIN: CPT | Performed by: ORTHOPAEDIC SURGERY

## 2020-11-17 PROCEDURE — G8417 CALC BMI ABV UP PARAM F/U: HCPCS | Performed by: ORTHOPAEDIC SURGERY

## 2020-11-17 PROCEDURE — G8484 FLU IMMUNIZE NO ADMIN: HCPCS | Performed by: ORTHOPAEDIC SURGERY

## 2020-11-17 NOTE — PROGRESS NOTES
New Patient: CERVICAL SPINE    Referring Provider:  Nai Joseph DO    CHIEF COMPLAINT:    Chief Complaint   Patient presents with    Neck Pain     Patient states that she had neck pain for the last 7 months. Patient complains of neck pain and bilateral arm pain and some numbness in hands. No conservative treatment. HISTORY OF PRESENT ILLNESS:   Ms. Osiris Kirby is a pleasant 79 y.o. old female here for consultation regarding her neck and bilateral arm pain, R=L. She states the pain began insidiously about 7 months ago. Her pain has steadily increased since then. She rates her pain 7/10. She describes the pain as constant and aching. The arm pain radiates to her biceps, forearms, and thumbs bilaterally. She admits occasional numbness and tingling in her thumbs. She denies progressive weakness of her arms. Patient denies difficulty with fine motor skills. She denies lower extremity symptoms, gait abnormality and bowel or bladder dysfunction. The pain moderately interferes with her sleep. Patient is established with [facility] for her lumbar spine which she had spinal cord stimulator implanted about 5 months ago.     Current/Past Treatment:   · Physical Therapy: yes - minimal relief  · Chiropractic:  none  · Injection:  None for her cervical spine   · Medications: gabapentin, oral steroids    Past Medical History:   Past Medical History:   Diagnosis Date    Back pain, chronic     Depression     Joint pain     Sleep apnea       Past Surgical History:     Past Surgical History:   Procedure Laterality Date    LUMBAR FUSION  2016    PARTIAL HYSTERECTOMY  2002    SPINAL CORD STIMULATOR SURGERY      Transmit receive head coil only     Current Medications:     Current Outpatient Medications:     gabapentin (NEURONTIN) 100 MG capsule, AT THE START OF THERAPY, TAKE 1 CAP AT BEDTIME  FOR 3 NIGHTS, THEN 2 CAPS AT BEDTIME FOR 3      NIGHTS, THEN 3 CAPS AT BEDTIME THEREAFTER, Disp: 270 capsule, Rfl: motion is unremarkable. There is no gross instability. There are no rashes, ulcerations or lesions. Strength and tone are normal.    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 Left sided disc protrusion C6-7. Impression:   Cervical stenosis  Cervical radiculopathy    Plan:    Discussed treatment options including observation, additional physical therapy, epidural injections and surgical intervention. She would like to proceed with home cervical traction and epidural injection for her cervical spine.   A cervical epidural injection and a home traction program.

## 2020-12-18 ENCOUNTER — TELEPHONE (OUTPATIENT)
Dept: INTERNAL MEDICINE CLINIC | Age: 70
End: 2020-12-18

## 2020-12-18 NOTE — TELEPHONE ENCOUNTER
Pt called stating that she is covid positive. She went to get tested tues. They reported her heart rate was 44 at that time. Her EKG was fine. She is wondering if this is covid related. She has all the cold symptoms, lose of smell and taste. She did have diarrhea but that has resolved. She is unable to take her pulse at home. She has an oximeter coming. She does get lightheaded when she gets up.

## 2020-12-18 NOTE — TELEPHONE ENCOUNTER
You will need to self-quarantine for 14 days AND 3 days after your symptom improvement, whichever happens later. Anyone that you live with should also self quarantine for 14 days as well. This means that you should not go to the grocery store (use the [employer] at [facility] or have friends or family shop for you and leave on your porch) or go to the pharmacy (if you must, use the drive through and do not go into the store). I do recommend purchasing a pulse oximeter. This can be purchased through [employer]  or at your local pharmacy. This will help to monitor your oxygen levels. Should your oxygen levels drop below 92% you would need to go to the emergency department. If you should develop severe shortness of breath chest pain, please go to the ED. For your symptoms, I recommend managing them as you would a common cold with nasal saline, Coricidin HB , and Tylenol as needed for fevers and discomfort. Offer VV if desired.  saw

## 2021-01-06 DIAGNOSIS — M54.12 CERVICAL RADICULOPATHY: ICD-10-CM

## 2021-01-06 RX ORDER — PREDNISONE 10 MG/1
TABLET ORAL
Qty: 30 TABLET | Refills: 0 | Status: SHIPPED | OUTPATIENT
Start: 2021-01-06 | End: 2021-02-26 | Stop reason: ALTCHOICE

## 2021-02-26 ENCOUNTER — OFFICE VISIT (OUTPATIENT)
Dept: INTERNAL MEDICINE CLINIC | Age: 71
End: 2021-02-26
Payer: MEDICARE

## 2021-02-26 VITALS
WEIGHT: 191.4 LBS | BODY MASS INDEX: 31.85 KG/M2 | OXYGEN SATURATION: 99 % | SYSTOLIC BLOOD PRESSURE: 120 MMHG | HEART RATE: 68 BPM | DIASTOLIC BLOOD PRESSURE: 76 MMHG

## 2021-02-26 DIAGNOSIS — E55.9 VITAMIN D DEFICIENCY: ICD-10-CM

## 2021-02-26 DIAGNOSIS — E11.9 TYPE 2 DIABETES MELLITUS WITHOUT COMPLICATION, WITHOUT LONG-TERM CURRENT USE OF INSULIN (HCC): ICD-10-CM

## 2021-02-26 DIAGNOSIS — F32.A ANXIETY AND DEPRESSION: ICD-10-CM

## 2021-02-26 DIAGNOSIS — R00.1 BRADYCARDIA: ICD-10-CM

## 2021-02-26 DIAGNOSIS — I10 ESSENTIAL HYPERTENSION: ICD-10-CM

## 2021-02-26 DIAGNOSIS — E78.5 HYPERLIPIDEMIA LDL GOAL <100: ICD-10-CM

## 2021-02-26 DIAGNOSIS — Z86.16 HISTORY OF COVID-19: ICD-10-CM

## 2021-02-26 DIAGNOSIS — M54.12 CERVICAL RADICULOPATHY: Primary | ICD-10-CM

## 2021-02-26 DIAGNOSIS — F41.9 ANXIETY AND DEPRESSION: ICD-10-CM

## 2021-02-26 DIAGNOSIS — M25.561 CHRONIC PAIN OF BOTH KNEES: ICD-10-CM

## 2021-02-26 DIAGNOSIS — M25.562 CHRONIC PAIN OF BOTH KNEES: ICD-10-CM

## 2021-02-26 DIAGNOSIS — M51.36 DDD (DEGENERATIVE DISC DISEASE), LUMBAR: ICD-10-CM

## 2021-02-26 DIAGNOSIS — G89.29 CHRONIC PAIN OF BOTH KNEES: ICD-10-CM

## 2021-02-26 LAB
BASOPHILS ABSOLUTE: 0.1 K/UL (ref 0–0.2)
BASOPHILS RELATIVE PERCENT: 0.9 %
EOSINOPHILS ABSOLUTE: 0.3 K/UL (ref 0–0.6)
EOSINOPHILS RELATIVE PERCENT: 3.1 %
HCT VFR BLD CALC: 39.8 % (ref 36–48)
HEMOGLOBIN: 13.2 G/DL (ref 12–16)
LYMPHOCYTES ABSOLUTE: 2.6 K/UL (ref 1–5.1)
LYMPHOCYTES RELATIVE PERCENT: 26.3 %
MCH RBC QN AUTO: 28 PG (ref 26–34)
MCHC RBC AUTO-ENTMCNC: 33.3 G/DL (ref 31–36)
MCV RBC AUTO: 84.1 FL (ref 80–100)
MONOCYTES ABSOLUTE: 0.8 K/UL (ref 0–1.3)
MONOCYTES RELATIVE PERCENT: 8.1 %
NEUTROPHILS ABSOLUTE: 6.1 K/UL (ref 1.7–7.7)
NEUTROPHILS RELATIVE PERCENT: 61.6 %
PDW BLD-RTO: 14.3 % (ref 12.4–15.4)
PLATELET # BLD: 333 K/UL (ref 135–450)
PMV BLD AUTO: 8.9 FL (ref 5–10.5)
RBC # BLD: 4.73 M/UL (ref 4–5.2)
WBC # BLD: 9.9 K/UL (ref 4–11)

## 2021-02-26 PROCEDURE — 3017F COLORECTAL CA SCREEN DOC REV: CPT | Performed by: INTERNAL MEDICINE

## 2021-02-26 PROCEDURE — G8427 DOCREV CUR MEDS BY ELIG CLIN: HCPCS | Performed by: INTERNAL MEDICINE

## 2021-02-26 PROCEDURE — G8400 PT W/DXA NO RESULTS DOC: HCPCS | Performed by: INTERNAL MEDICINE

## 2021-02-26 PROCEDURE — 99214 OFFICE O/P EST MOD 30 MIN: CPT | Performed by: INTERNAL MEDICINE

## 2021-02-26 PROCEDURE — 4040F PNEUMOC VAC/ADMIN/RCVD: CPT | Performed by: INTERNAL MEDICINE

## 2021-02-26 PROCEDURE — 3044F HG A1C LEVEL LT 7.0%: CPT | Performed by: INTERNAL MEDICINE

## 2021-02-26 PROCEDURE — 93000 ELECTROCARDIOGRAM COMPLETE: CPT | Performed by: INTERNAL MEDICINE

## 2021-02-26 PROCEDURE — 2022F DILAT RTA XM EVC RTNOPTHY: CPT | Performed by: INTERNAL MEDICINE

## 2021-02-26 PROCEDURE — G8417 CALC BMI ABV UP PARAM F/U: HCPCS | Performed by: INTERNAL MEDICINE

## 2021-02-26 PROCEDURE — 1036F TOBACCO NON-USER: CPT | Performed by: INTERNAL MEDICINE

## 2021-02-26 PROCEDURE — G8484 FLU IMMUNIZE NO ADMIN: HCPCS | Performed by: INTERNAL MEDICINE

## 2021-02-26 PROCEDURE — 1123F ACP DISCUSS/DSCN MKR DOCD: CPT | Performed by: INTERNAL MEDICINE

## 2021-02-26 PROCEDURE — 1090F PRES/ABSN URINE INCON ASSESS: CPT | Performed by: INTERNAL MEDICINE

## 2021-02-26 RX ORDER — OMEPRAZOLE 20 MG/1
20 CAPSULE, DELAYED RELEASE ORAL DAILY
Qty: 90 CAPSULE | Refills: 3 | Status: SHIPPED | OUTPATIENT
Start: 2021-02-26 | End: 2022-05-11 | Stop reason: SDUPTHER

## 2021-02-26 RX ORDER — GABAPENTIN 300 MG/1
300 CAPSULE ORAL NIGHTLY
Qty: 90 CAPSULE | Refills: 3 | Status: SHIPPED | OUTPATIENT
Start: 2021-02-26 | End: 2022-01-18

## 2021-02-26 RX ORDER — PREDNISONE 1 MG/1
5 TABLET ORAL DAILY
Qty: 90 TABLET | Refills: 1 | Status: SHIPPED | OUTPATIENT
Start: 2021-02-26 | End: 2021-07-23 | Stop reason: SDUPTHER

## 2021-02-26 RX ORDER — AMLODIPINE BESYLATE 10 MG/1
10 TABLET ORAL DAILY
Qty: 90 TABLET | Refills: 3 | Status: SHIPPED | OUTPATIENT
Start: 2021-02-26 | End: 2021-06-08 | Stop reason: ALTCHOICE

## 2021-02-26 RX ORDER — TELMISARTAN 80 MG/1
80 TABLET ORAL DAILY
Qty: 90 TABLET | Refills: 3 | Status: SHIPPED | OUTPATIENT
Start: 2021-02-26 | End: 2022-03-17

## 2021-02-26 RX ORDER — METOPROLOL SUCCINATE 100 MG/1
100 TABLET, EXTENDED RELEASE ORAL 2 TIMES DAILY
Qty: 180 TABLET | Refills: 3 | Status: SHIPPED | OUTPATIENT
Start: 2021-02-26 | End: 2022-04-12

## 2021-02-26 RX ORDER — ROSUVASTATIN CALCIUM 10 MG/1
10 TABLET, COATED ORAL DAILY
Qty: 90 TABLET | Refills: 3 | Status: SHIPPED | OUTPATIENT
Start: 2021-02-26 | End: 2021-03-07 | Stop reason: SDUPTHER

## 2021-02-26 RX ORDER — ERGOCALCIFEROL 1.25 MG/1
50000 CAPSULE ORAL WEEKLY
Qty: 12 CAPSULE | Refills: 3 | Status: SHIPPED | OUTPATIENT
Start: 2021-02-26 | End: 2022-01-27

## 2021-02-26 RX ORDER — DULOXETIN HYDROCHLORIDE 30 MG/1
90 CAPSULE, DELAYED RELEASE ORAL DAILY
Qty: 270 CAPSULE | Refills: 3 | Status: SHIPPED | OUTPATIENT
Start: 2021-02-26 | End: 2021-06-08 | Stop reason: ALTCHOICE

## 2021-02-26 ASSESSMENT — ENCOUNTER SYMPTOMS
CONSTIPATION: 0
CHEST TIGHTNESS: 0
SHORTNESS OF BREATH: 0
DIARRHEA: 0

## 2021-02-26 NOTE — PROGRESS NOTES
Patient: Nash Luevano is a 79 y.o. female who presents today with the following Chief Complaint(s):  Chief Complaint   Patient presents with    6 Month Follow-Up       HPI     Here today for follow up. States that she is in a lot of pain. Does feel better with prednisone but knows that she is unable to take it daily. Is already on Cymbalta 90 mg qd and gabapentin 300 mg qhs. Has not had a lot of relief with spinal stimulator. Due for labs. Is fasting x 4 hours. Did have COVID in December. Was mild. Has not yet gotten her COVID vaccine. When she had COVID, her HR was in the 40's. Noticed that for several weeks after, she had weakness in her legs, blurred vision. Had abnormal mammogram in December, needs to be repeat in May. HTN- no issues. Due for labs. HLD- no issues. Due for labs. DM- no issues. Has lost some weight. Due for labs. Allergies   Allergen Reactions    Compazine [Prochlorperazine Maleate] Shortness Of Breath     dyspnea    Morphine Nausea And Vomiting     N&V      Past Medical History:   Diagnosis Date    Back pain, chronic     Depression     Joint pain     Sleep apnea       Past Surgical History:   Procedure Laterality Date    LUMBAR FUSION  2016    PARTIAL HYSTERECTOMY  2002    SPINAL CORD STIMULATOR SURGERY      Transmit receive head coil only      Social History     Socioeconomic History    Marital status:      Spouse name: Not on file    Number of children: Not on file    Years of education: Not on file    Highest education level: Not on file   Occupational History    Occupation: retired   Social Needs    Financial resource strain: Not hard at all   Shawanda-Angelica insecurity     Worry: Never true     Inability: Never true   [employer] needs     Medical: No     Non-medical: No   Tobacco Use    Smoking status: Never Smoker    Smokeless tobacco: Never Used   Substance and Sexual Activity    Alcohol use: No    Drug use:  No  Sexual activity: Not on file   Lifestyle    Physical activity     Days per week: Not on file     Minutes per session: Not on file    Stress: Not on file   Relationships    Social connections     Talks on phone: Not on file     Gets together: Not on file     Attends Bahai service: Not on file     Active member of club or organization: Not on file     Attends meetings of clubs or organizations: Not on file     Relationship status: Not on file    Intimate partner violence     Fear of current or ex partner: Not on file     Emotionally abused: Not on file     Physically abused: Not on file     Forced sexual activity: Not on file   Other Topics Concern    Not on file   Social History Narrative    Not on file     Family History   Problem Relation Age of Onset    Heart Failure Mother     COPD Father         Outpatient Medications Prior to Visit   Medication Sig Dispense Refill    niacin 500 MG extended release capsule Take 500 mg by mouth nightly      Magnesium Hydroxide (MAGNESIA PO) Take 50 mg by mouth daily       CPAP Machine MISC by Does not apply route      amLODIPine (NORVASC) 10 MG tablet TAKE 1 TABLET DAILY 90 tablet 3    predniSONE (DELTASONE) 10 MG tablet 4 po qd x 3 days -> 3 po qd x 3 days -> 2 po qd x 3 days -> 1 po qd x 3 days (Patient not taking: Reported on 2/26/2021) 30 tablet 0    gabapentin (NEURONTIN) 100 MG capsule AT THE START OF THERAPY, TAKE 1 CAP AT BEDTIME  FOR 3 NIGHTS, THEN 2 CAPS AT BEDTIME FOR 3      NIGHTS, THEN 3 CAPS AT BEDTIME THEREAFTER 270 capsule 3    omeprazole (PRILOSEC) 20 MG delayed release capsule TAKE 1 CAPSULE DAILY (NEW DOSE) 90 capsule 3    DULoxetine (CYMBALTA) 30 MG extended release capsule Take 3 capsules by mouth daily 270 capsule 3    telmisartan (MICARDIS) 80 MG tablet TAKE 1 TABLET DAILY 90 tablet 3    TOPROL  MG extended release tablet TAKE 1 TABLET TWICE A  tablet 3  vitamin D (ERGOCALCIFEROL) 1.25 MG (16281 UT) CAPS capsule TAKE 1 CAPSULE ONCE A WEEK 12 capsule 4    rosuvastatin (CRESTOR) 10 MG tablet TAKE 1 TABLET NIGHTLY 90 tablet 4     No facility-administered medications prior to visit. Patient'spast medical history, surgical history, family history, medications,  and allergies  were all reviewed and updated as appropriate today. Review of Systems   Constitutional: Negative for appetite change, fatigue and fever. Respiratory: Negative for chest tightness and shortness of breath. Cardiovascular: Negative for chest pain. Gastrointestinal: Negative for constipation and diarrhea. Skin: Negative for rash. /76   Pulse 68   Wt 191 lb 6.4 oz (86.8 kg)   SpO2 99%   BMI 31.85 kg/m²   Physical Exam  Vitals signs and nursing note reviewed. Constitutional:       Appearance: She is well-developed. She is not toxic-appearing. HENT:      Head: Normocephalic. Right Ear: Tympanic membrane, ear canal and external ear normal.      Left Ear: Tympanic membrane, ear canal and external ear normal.   Eyes:      General: No scleral icterus. Extraocular Movements: Extraocular movements intact. Conjunctiva/sclera: Conjunctivae normal.      Pupils: Pupils are equal, round, and reactive to light. Neck:      Thyroid: No thyroid mass or thyromegaly. Vascular: No carotid bruit. Cardiovascular:      Rate and Rhythm: Normal rate and regular rhythm. Pulses:           Dorsalis pedis pulses are 2+ on the right side and 2+ on the left side. Heart sounds: Normal heart sounds. No murmur. Comments: No LE edema  Pulmonary:      Effort: Pulmonary effort is normal.      Breath sounds: Normal breath sounds. Lymphadenopathy:      Cervical: No cervical adenopathy. Neurological:      Mental Status: She is alert. Psychiatric:         Mood and Affect: Mood normal.         Behavior: Behavior normal. Behavior is cooperative. ASSESSMENT/PLAN:    Problem List Items Addressed This Visit     Anxiety and depression     Continue Cymbalta 90 mg daily. Working well for her. Relevant Medications    DULoxetine (CYMBALTA) 30 MG extended release capsule    DDD (degenerative disc disease), lumbar     Patient continues to have significant low back and neck pain. She does not wish to take narcotics due to her son's history of opioid addiction. She has not had improvement with surgery or a spinal stimulator nor has she had relief with epidural injections in the past.  She remains on Cymbalta 90 mg daily and gabapentin 300 mg nightly. She is unable to take NSAIDs due to chronic kidney disease. She states that she feels best when she is on prednisone. At this point we did review the risks of long-term prednisone use, including diabetes, osteoporosis, cataracts, and adrenal suppression, and Cushing's syndrome. Based on her improvement in quality of life while taking prednisone she will likely benefit from low-dose daily prednisone use as there really are no other alternative medications for her. Start prednisone 5 mg daily. Relevant Medications    predniSONE (DELTASONE) 5 MG tablet    Essential hypertension     Well controlled. Remains amlodipine 10 mg qd, Micardis 80 mg qd, metoprolol  mg BID. Patient has been noted to be bradycardic and may need to decrease her metoprolol dose if she becomes symptomatic or her heart rate drops into the 40s consistently. Relevant Medications    metoprolol succinate (TOPROL XL) 100 MG extended release tablet    telmisartan (MICARDIS) 80 MG tablet    amLODIPine (NORVASC) 10 MG tablet    Other Relevant Orders    CBC Auto Differential (Completed)    Hyperlipidemia LDL goal <100     Continue Crestor 10 mg daily. Check lipid panel, CMP.          Relevant Medications    rosuvastatin (CRESTOR) 10 MG tablet    metoprolol succinate (TOPROL XL) 100 MG extended release tablet telmisartan (MICARDIS) 80 MG tablet    amLODIPine (NORVASC) 10 MG tablet    Other Relevant Orders    Comprehensive Metabolic Panel (Completed)    Lipid Panel (Completed)    Vitamin D deficiency     Check vitamin D level. Continue weekly ergocalciferol. Relevant Orders    VITAMIN D 25 HYDROXY (Completed)    Chronic pain of both knees     Patient follows with Ortho as needed. Does have improvement in knee pain while on prednisone. She has not eligible for NSAID use due to chronic kidney disease. Starting prednisone 5 mg daily to help with chronic neck and low back pain which significantly impacts her quality of life. This should help with her knee pain as well. Relevant Medications    gabapentin (NEURONTIN) 300 MG capsule    predniSONE (DELTASONE) 5 MG tablet    DULoxetine (CYMBALTA) 30 MG extended release capsule    Type 2 diabetes mellitus without complication, without long-term current use of insulin ([address]     Discussed with patient that diabetes will likely worsen with daily prednisone use. Check hemoglobin A1c. Continue to focus on diet.          Relevant Orders    Hemoglobin A1C (Completed)    Comprehensive Metabolic Panel    Hemoglobin A1C    CBC Auto Differential    Cervical radiculopathy - Primary Patient continues to have significant low back and neck pain. She does not wish to take narcotics due to her son's history of opioid addiction. She has not had improvement with surgery or a spinal stimulator nor has she had relief with epidural injections in the past.  She remains on Cymbalta 90 mg daily and gabapentin 300 mg nightly. She is unable to take NSAIDs due to chronic kidney disease. She states that she feels best when she is on prednisone. At this point we did review the risks of long-term prednisone use, including diabetes, osteoporosis, cataracts, and adrenal suppression, and Cushing's syndrome. Based on her improvement in quality of life while taking prednisone she will likely benefit from low-dose daily prednisone use as there really are no other alternative medications for her. Start prednisone 5 mg daily. Relevant Medications    gabapentin (NEURONTIN) 300 MG capsule    DULoxetine (CYMBALTA) 30 MG extended release capsule    Bradycardia     Patient and to be bradycardic with heart rate in the 40s while she was ill with COVID-19. Her heart rate today is in the 60s. Reviewed with patient that her bradycardia is most likely related to her metoprolol  mg daily. EKG done in office today with bradycardia but was otherwise unremarkable. Relevant Orders    EKG 12 Lead (Completed)    History of COVID-19     Patient had COVID-19 in December. Fortunately, her symptoms were mild. She was seen in urgent care and was found to have a heart rate in the 40s. Has not had any other symptoms of bradycardia. Will check EKG today. Discussed with patient that it is important for her to get her COVID-19 vaccine as she may  again contract COVID-19. Patient is agreeable and will check her Covid vaccine appointments in [place].                Current Outpatient Medications   Medication Sig Dispense Refill  gabapentin (NEURONTIN) 300 MG capsule Take 1 capsule by mouth nightly for 90 days. 90 capsule 3    predniSONE (DELTASONE) 5 MG tablet Take 1 tablet by mouth daily 90 tablet 1    rosuvastatin (CRESTOR) 10 MG tablet Take 1 tablet by mouth daily 90 tablet 3    omeprazole (PRILOSEC) 20 MG delayed release capsule Take 1 capsule by mouth Daily = 90 capsule 3    vitamin D (ERGOCALCIFEROL) 1.25 MG (14654 UT) CAPS capsule Take 1 capsule by mouth once a week 12 capsule 3    metoprolol succinate (TOPROL XL) 100 MG extended release tablet Take 1 tablet by mouth 2 times daily 180 tablet 3    telmisartan (MICARDIS) 80 MG tablet Take 1 tablet by mouth daily 90 tablet 3    amLODIPine (NORVASC) 10 MG tablet Take 1 tablet by mouth daily 90 tablet 3    DULoxetine (CYMBALTA) 30 MG extended release capsule Take 3 capsules by mouth daily 270 capsule 3    niacin 500 MG extended release capsule Take 500 mg by mouth nightly      Magnesium Hydroxide (MAGNESIA PO) Take 50 mg by mouth daily       CPAP Machine MISC by Does not apply route       No current facility-administered medications for this visit. Return in about 4 months (around 6/26/2021).

## 2021-02-27 LAB
A/G RATIO: 1.3 (ref 1.1–2.2)
ALBUMIN SERPL-MCNC: 4.2 G/DL (ref 3.4–5)
ALP BLD-CCNC: 78 U/L (ref 40–129)
ALT SERPL-CCNC: 12 U/L (ref 10–40)
ANION GAP SERPL CALCULATED.3IONS-SCNC: 12 MMOL/L (ref 3–16)
AST SERPL-CCNC: 16 U/L (ref 15–37)
BILIRUB SERPL-MCNC: 0.4 MG/DL (ref 0–1)
BUN BLDV-MCNC: 16 MG/DL (ref 7–20)
CALCIUM SERPL-MCNC: 9.7 MG/DL (ref 8.3–10.6)
CHLORIDE BLD-SCNC: 103 MMOL/L (ref 99–110)
CHOLESTEROL, TOTAL: 172 MG/DL (ref 0–199)
CO2: 24 MMOL/L (ref 21–32)
CREAT SERPL-MCNC: 1 MG/DL (ref 0.6–1.2)
ESTIMATED AVERAGE GLUCOSE: 142.7 MG/DL
GFR AFRICAN AMERICAN: >60
GFR NON-AFRICAN AMERICAN: 55
GLOBULIN: 3.3 G/DL
GLUCOSE BLD-MCNC: 94 MG/DL (ref 70–99)
HBA1C MFR BLD: 6.6 %
HDLC SERPL-MCNC: 42 MG/DL (ref 40–60)
LDL CHOLESTEROL CALCULATED: 93 MG/DL
POTASSIUM SERPL-SCNC: 4.1 MMOL/L (ref 3.5–5.1)
SODIUM BLD-SCNC: 139 MMOL/L (ref 136–145)
TOTAL PROTEIN: 7.5 G/DL (ref 6.4–8.2)
TRIGL SERPL-MCNC: 187 MG/DL (ref 0–150)
VITAMIN D 25-HYDROXY: 58.7 NG/ML
VLDLC SERPL CALC-MCNC: 37 MG/DL

## 2021-02-28 PROBLEM — Z86.16 HISTORY OF COVID-19: Status: ACTIVE | Noted: 2021-02-28

## 2021-03-01 NOTE — ASSESSMENT & PLAN NOTE
Patient and to be bradycardic with heart rate in the 40s while she was ill with COVID-19. Her heart rate today is in the 60s. Reviewed with patient that her bradycardia is most likely related to her metoprolol  mg daily. EKG done in office today with bradycardia but was otherwise unremarkable.

## 2021-03-01 NOTE — ASSESSMENT & PLAN NOTE
Patient follows with Ortho as needed. Does have improvement in knee pain while on prednisone. She has not eligible for NSAID use due to chronic kidney disease. Starting prednisone 5 mg daily to help with chronic neck and low back pain which significantly impacts her quality of life. This should help with her knee pain as well.

## 2021-03-01 NOTE — ASSESSMENT & PLAN NOTE
Patient had COVID-19 in December. Fortunately, her symptoms were mild. She was seen in urgent care and was found to have a heart rate in the 40s. Has not had any other symptoms of bradycardia. Will check EKG today. Discussed with patient that it is important for her to get her COVID-19 vaccine as she may  again contract COVID-19. Patient is agreeable and will check her Covid vaccine appointments in [place].

## 2021-03-01 NOTE — ASSESSMENT & PLAN NOTE
Well controlled. Remains amlodipine 10 mg qd, Micardis 80 mg qd, metoprolol  mg BID. Patient has been noted to be bradycardic and may need to decrease her metoprolol dose if she becomes symptomatic or her heart rate drops into the 40s consistently.

## 2021-03-01 NOTE — ASSESSMENT & PLAN NOTE
Patient continues to have significant low back and neck pain. She does not wish to take narcotics due to her son's history of opioid addiction. She has not had improvement with surgery or a spinal stimulator nor has she had relief with epidural injections in the past.  She remains on Cymbalta 90 mg daily and gabapentin 300 mg nightly. She is unable to take NSAIDs due to chronic kidney disease. She states that she feels best when she is on prednisone. At this point we did review the risks of long-term prednisone use, including diabetes, osteoporosis, cataracts, and adrenal suppression, and Cushing's syndrome. Based on her improvement in quality of life while taking prednisone she will likely benefit from low-dose daily prednisone use as there really are no other alternative medications for her. Start prednisone 5 mg daily.

## 2021-03-07 DIAGNOSIS — E11.9 TYPE 2 DIABETES MELLITUS WITHOUT COMPLICATION, WITHOUT LONG-TERM CURRENT USE OF INSULIN (HCC): Primary | ICD-10-CM

## 2021-03-07 DIAGNOSIS — E78.5 HYPERLIPIDEMIA LDL GOAL <100: ICD-10-CM

## 2021-03-07 RX ORDER — ROSUVASTATIN CALCIUM 20 MG/1
20 TABLET, COATED ORAL DAILY
Qty: 90 TABLET | Refills: 3 | Status: SHIPPED | OUTPATIENT
Start: 2021-03-07 | End: 2022-06-24 | Stop reason: SDUPTHER

## 2021-03-07 RX ORDER — METFORMIN HYDROCHLORIDE 500 MG/1
500 TABLET, EXTENDED RELEASE ORAL 2 TIMES DAILY WITH MEALS
Qty: 180 TABLET | Refills: 1 | Status: SHIPPED | OUTPATIENT
Start: 2021-03-07 | End: 2021-08-19

## 2021-06-03 DIAGNOSIS — E11.9 TYPE 2 DIABETES MELLITUS WITHOUT COMPLICATION, WITHOUT LONG-TERM CURRENT USE OF INSULIN (HCC): ICD-10-CM

## 2021-06-03 LAB
A/G RATIO: 1.4 (ref 1.1–2.2)
ALBUMIN SERPL-MCNC: 4.3 G/DL (ref 3.4–5)
ALP BLD-CCNC: 69 U/L (ref 40–129)
ALT SERPL-CCNC: 12 U/L (ref 10–40)
ANION GAP SERPL CALCULATED.3IONS-SCNC: 15 MMOL/L (ref 3–16)
AST SERPL-CCNC: 13 U/L (ref 15–37)
BASOPHILS ABSOLUTE: 0.1 K/UL (ref 0–0.2)
BASOPHILS RELATIVE PERCENT: 0.6 %
BILIRUB SERPL-MCNC: 0.3 MG/DL (ref 0–1)
BUN BLDV-MCNC: 25 MG/DL (ref 7–20)
CALCIUM SERPL-MCNC: 9.8 MG/DL (ref 8.3–10.6)
CHLORIDE BLD-SCNC: 105 MMOL/L (ref 99–110)
CO2: 22 MMOL/L (ref 21–32)
CREAT SERPL-MCNC: 1.1 MG/DL (ref 0.6–1.2)
EOSINOPHILS ABSOLUTE: 0.3 K/UL (ref 0–0.6)
EOSINOPHILS RELATIVE PERCENT: 3.5 %
GFR AFRICAN AMERICAN: 59
GFR NON-AFRICAN AMERICAN: 49
GLOBULIN: 3.1 G/DL
GLUCOSE BLD-MCNC: 82 MG/DL (ref 70–99)
HCT VFR BLD CALC: 40.8 % (ref 36–48)
HEMOGLOBIN: 13.4 G/DL (ref 12–16)
LYMPHOCYTES ABSOLUTE: 2.6 K/UL (ref 1–5.1)
LYMPHOCYTES RELATIVE PERCENT: 29.9 %
MCH RBC QN AUTO: 29.1 PG (ref 26–34)
MCHC RBC AUTO-ENTMCNC: 32.9 G/DL (ref 31–36)
MCV RBC AUTO: 88.5 FL (ref 80–100)
MONOCYTES ABSOLUTE: 0.7 K/UL (ref 0–1.3)
MONOCYTES RELATIVE PERCENT: 8.6 %
NEUTROPHILS ABSOLUTE: 5 K/UL (ref 1.7–7.7)
NEUTROPHILS RELATIVE PERCENT: 57.4 %
PDW BLD-RTO: 15.3 % (ref 12.4–15.4)
PLATELET # BLD: 300 K/UL (ref 135–450)
PMV BLD AUTO: 8.7 FL (ref 5–10.5)
POTASSIUM SERPL-SCNC: 4.6 MMOL/L (ref 3.5–5.1)
RBC # BLD: 4.61 M/UL (ref 4–5.2)
SODIUM BLD-SCNC: 142 MMOL/L (ref 136–145)
TOTAL PROTEIN: 7.4 G/DL (ref 6.4–8.2)
WBC # BLD: 8.7 K/UL (ref 4–11)

## 2021-06-04 LAB
ESTIMATED AVERAGE GLUCOSE: 125.5 MG/DL
HBA1C MFR BLD: 6 %

## 2021-06-08 ENCOUNTER — OFFICE VISIT (OUTPATIENT)
Dept: INTERNAL MEDICINE CLINIC | Age: 71
End: 2021-06-08
Payer: MEDICARE

## 2021-06-08 VITALS
SYSTOLIC BLOOD PRESSURE: 126 MMHG | DIASTOLIC BLOOD PRESSURE: 86 MMHG | OXYGEN SATURATION: 97 % | WEIGHT: 193 LBS | HEART RATE: 72 BPM | BODY MASS INDEX: 32.12 KG/M2

## 2021-06-08 DIAGNOSIS — I10 ESSENTIAL HYPERTENSION: ICD-10-CM

## 2021-06-08 DIAGNOSIS — M25.561 CHRONIC PAIN OF BOTH KNEES: ICD-10-CM

## 2021-06-08 DIAGNOSIS — E78.5 HYPERLIPIDEMIA LDL GOAL <70: ICD-10-CM

## 2021-06-08 DIAGNOSIS — F32.A ANXIETY AND DEPRESSION: Primary | ICD-10-CM

## 2021-06-08 DIAGNOSIS — M54.12 CERVICAL RADICULOPATHY: ICD-10-CM

## 2021-06-08 DIAGNOSIS — G89.29 CHRONIC PAIN OF BOTH KNEES: ICD-10-CM

## 2021-06-08 DIAGNOSIS — M25.562 CHRONIC PAIN OF BOTH KNEES: ICD-10-CM

## 2021-06-08 DIAGNOSIS — Z86.16 HISTORY OF COVID-19: ICD-10-CM

## 2021-06-08 DIAGNOSIS — E55.9 VITAMIN D DEFICIENCY: ICD-10-CM

## 2021-06-08 DIAGNOSIS — G47.33 OSA (OBSTRUCTIVE SLEEP APNEA): ICD-10-CM

## 2021-06-08 DIAGNOSIS — E11.9 TYPE 2 DIABETES MELLITUS WITHOUT COMPLICATION, WITHOUT LONG-TERM CURRENT USE OF INSULIN (HCC): ICD-10-CM

## 2021-06-08 DIAGNOSIS — R53.83 OTHER FATIGUE: ICD-10-CM

## 2021-06-08 DIAGNOSIS — F41.9 ANXIETY AND DEPRESSION: Primary | ICD-10-CM

## 2021-06-08 PROCEDURE — G8427 DOCREV CUR MEDS BY ELIG CLIN: HCPCS | Performed by: INTERNAL MEDICINE

## 2021-06-08 PROCEDURE — 1036F TOBACCO NON-USER: CPT | Performed by: INTERNAL MEDICINE

## 2021-06-08 PROCEDURE — G8417 CALC BMI ABV UP PARAM F/U: HCPCS | Performed by: INTERNAL MEDICINE

## 2021-06-08 PROCEDURE — 1123F ACP DISCUSS/DSCN MKR DOCD: CPT | Performed by: INTERNAL MEDICINE

## 2021-06-08 PROCEDURE — 1090F PRES/ABSN URINE INCON ASSESS: CPT | Performed by: INTERNAL MEDICINE

## 2021-06-08 PROCEDURE — 99214 OFFICE O/P EST MOD 30 MIN: CPT | Performed by: INTERNAL MEDICINE

## 2021-06-08 PROCEDURE — 3044F HG A1C LEVEL LT 7.0%: CPT | Performed by: INTERNAL MEDICINE

## 2021-06-08 PROCEDURE — 3017F COLORECTAL CA SCREEN DOC REV: CPT | Performed by: INTERNAL MEDICINE

## 2021-06-08 PROCEDURE — G8400 PT W/DXA NO RESULTS DOC: HCPCS | Performed by: INTERNAL MEDICINE

## 2021-06-08 PROCEDURE — 4040F PNEUMOC VAC/ADMIN/RCVD: CPT | Performed by: INTERNAL MEDICINE

## 2021-06-08 PROCEDURE — 2022F DILAT RTA XM EVC RTNOPTHY: CPT | Performed by: INTERNAL MEDICINE

## 2021-06-08 RX ORDER — ESCITALOPRAM OXALATE 20 MG/1
20 TABLET ORAL DAILY
Qty: 90 TABLET | Refills: 3 | Status: SHIPPED | OUTPATIENT
Start: 2021-06-08 | End: 2022-06-03

## 2021-06-08 NOTE — PROGRESS NOTES
Patient: Yuliya Davis is a 70 y.o. female who presents today with the following Chief Complaint(s):  Chief Complaint   Patient presents with    Check-Up       HPI     Here today for follow up. HLD- no issues with Crestor . DM- remains on metformin. HTN- did stop amlodipine after her last appointment. Is taking metoprolol and Micardis. No issues. Cervical radiculopathy- is about the same to a little better. Is taking Prednisone 5 mg qd which does help with her pain. Depression- did not tolerate Cymbalta 90 mg, did decrease back to 60 mg d/t hot flashes. Does not feel well. Is so very tired all of the time. Was previously on Wellbutrin with Lexapro but stopped Wellbutrin as does not recall that it really helped her. Does not feel like Cymbalta helped with the pain. Would like to go back on Lexapro as thinks that she felt better on it. Does have a CPAP that she does wear every night. Does not feel refreshed when she wakes up. Sleeps for 8.5 hours. Does follow with her sleep doctor every November. Knees are still bothering her. Has bone on bone.  Was getting injections with ortho [place],      Labs:  Lab Results   Component Value Date    WBC 8.7 06/03/2021    HGB 13.4 06/03/2021    HCT 40.8 06/03/2021    MCV 88.5 06/03/2021     06/03/2021     Lab Results   Component Value Date     06/03/2021    K 4.6 06/03/2021     06/03/2021    CO2 22 06/03/2021    BUN 25 (H) 06/03/2021    CREATININE 1.1 06/03/2021    GLUCOSE 82 06/03/2021    CALCIUM 9.8 06/03/2021    PROT 7.4 06/03/2021    LABALBU 4.3 06/03/2021    BILITOT 0.3 06/03/2021    ALKPHOS 69 06/03/2021    AST 13 (L) 06/03/2021    ALT 12 06/03/2021    LABGLOM 49 (A) 06/03/2021    GFRAA 59 (A) 06/03/2021    AGRATIO 1.4 06/03/2021    GLOB 3.1 06/03/2021       Lab Results   Component Value Date    LABA1C 6.0 06/03/2021     Lab Results   Component Value Date    .5 06/03/2021     Lab Results   Component Value Date LABA1C 6.0 06/03/2021    LABA1C 6.6 02/26/2021    LABA1C 6.5 09/10/2020     Lab Results   Component Value Date    LABMICR 3.80 (H) 09/10/2020    LDLCALC 93 02/26/2021    CREATININE 1.1 06/03/2021         Allergies   Allergen Reactions    Compazine [Prochlorperazine Maleate] Shortness Of Breath     dyspnea    Morphine Nausea And Vomiting     N&V      Past Medical History:   Diagnosis Date    Back pain, chronic     Depression     Joint pain     Sleep apnea       Past Surgical History:   Procedure Laterality Date    LUMBAR FUSION  2016    PARTIAL HYSTERECTOMY  2002    SPINAL CORD STIMULATOR SURGERY      Transmit receive head coil only      Social History     Socioeconomic History    Marital status:      Spouse name: Not on file    Number of children: Not on file    Years of education: Not on file    Highest education level: Not on file   Occupational History    Occupation: retired   Tobacco Use    Smoking status: Never Smoker    Smokeless tobacco: Never Used   Substance and Sexual Activity    Alcohol use: No    Drug use: No    Sexual activity: Not on file   Other Topics Concern    Not on file   Social History Narrative    Not on file     Social Determinants of Health     Financial Resource Strain: Low Risk     Difficulty of Paying Living Expenses: Not hard at all   Food Insecurity: No Food Insecurity    Worried About Running Out of Food in the Last Year: Never true    Nikia of Food in the Last Year: Never true   Transportation Needs: No Transportation Needs    Lack of Transportation (Medical): No    Lack of Transportation (Non-Medical):  No   Physical Activity:     Days of Exercise per Week:     Minutes of Exercise per Session:    Stress:     Feeling of Stress :    Social Connections:     Frequency of Communication with Friends and Family:     Frequency of Social Gatherings with Friends and Family:     Attends Druze Services:     Active Member of Clubs or Organizations:     Attends Club or Organization Meetings:     Marital Status:    Intimate Partner Violence:     Fear of Current or Ex-Partner:     Emotionally Abused:     Physically Abused:     Sexually Abused:      Family History   Problem Relation Age of Onset    Heart Failure Mother     COPD Father         Outpatient Medications Prior to Visit   Medication Sig Dispense Refill    rosuvastatin (CRESTOR) 20 MG tablet Take 1 tablet by mouth daily 90 tablet 3    metFORMIN (GLUCOPHAGE-XR) 500 MG extended release tablet Take 1 tablet by mouth 2 times daily (with meals) 180 tablet 1    predniSONE (DELTASONE) 5 MG tablet Take 1 tablet by mouth daily 90 tablet 1    omeprazole (PRILOSEC) 20 MG delayed release capsule Take 1 capsule by mouth Daily = 90 capsule 3    vitamin D (ERGOCALCIFEROL) 1.25 MG (21345 UT) CAPS capsule Take 1 capsule by mouth once a week 12 capsule 3    metoprolol succinate (TOPROL XL) 100 MG extended release tablet Take 1 tablet by mouth 2 times daily 180 tablet 3    telmisartan (MICARDIS) 80 MG tablet Take 1 tablet by mouth daily 90 tablet 3    niacin 500 MG extended release capsule Take 500 mg by mouth nightly      Magnesium Hydroxide (MAGNESIA PO) Take 50 mg by mouth daily       CPAP Machine MISC by Does not apply route      gabapentin (NEURONTIN) 300 MG capsule Take 1 capsule by mouth nightly for 90 days. 90 capsule 3    amLODIPine (NORVASC) 10 MG tablet Take 1 tablet by mouth daily 90 tablet 3    DULoxetine (CYMBALTA) 30 MG extended release capsule Take 3 capsules by mouth daily 270 capsule 3     No facility-administered medications prior to visit. Patient'spast medical history, surgical history, family history, medications,  and allergies  were all reviewed and updated as appropriate today. Review of Systems   Constitutional: Negative for appetite change, fatigue and fever. Respiratory: Negative for chest tightness and shortness of breath. Cardiovascular: Negative for chest pain. Metabolic Panel    Vitamin D deficiency     Continue weekly ergocalciferol. Relevant Orders    Vitamin D 25 Hydroxy          Current Outpatient Medications   Medication Sig Dispense Refill    escitalopram (LEXAPRO) 20 MG tablet Take 1 tablet by mouth daily 90 tablet 3    rosuvastatin (CRESTOR) 20 MG tablet Take 1 tablet by mouth daily 90 tablet 3    metFORMIN (GLUCOPHAGE-XR) 500 MG extended release tablet Take 1 tablet by mouth 2 times daily (with meals) 180 tablet 1    predniSONE (DELTASONE) 5 MG tablet Take 1 tablet by mouth daily 90 tablet 1    omeprazole (PRILOSEC) 20 MG delayed release capsule Take 1 capsule by mouth Daily = 90 capsule 3    vitamin D (ERGOCALCIFEROL) 1.25 MG (16612 UT) CAPS capsule Take 1 capsule by mouth once a week 12 capsule 3    metoprolol succinate (TOPROL XL) 100 MG extended release tablet Take 1 tablet by mouth 2 times daily 180 tablet 3    telmisartan (MICARDIS) 80 MG tablet Take 1 tablet by mouth daily 90 tablet 3    niacin 500 MG extended release capsule Take 500 mg by mouth nightly      Magnesium Hydroxide (MAGNESIA PO) Take 50 mg by mouth daily       CPAP Machine MISC by Does not apply route      terbinafine (LAMISIL) 250 MG tablet Take 1 tablet by mouth daily 84 tablet 0    gabapentin (NEURONTIN) 300 MG capsule Take 1 capsule by mouth nightly for 90 days. 90 capsule 3     No current facility-administered medications for this visit. Return in about 6 weeks (around 7/20/2021).

## 2021-06-08 NOTE — PATIENT INSTRUCTIONS
To wean off of Cymbalta and start Lexapro:  Cymbalta 30 mg 1 pill every other day for 2 weeks then stop  Take Lexapro 20 mg 1/2 pill daily for 2 weeks then increase to 1 pill daily (you will be off of Cymbalta by this time).

## 2021-06-14 ENCOUNTER — TELEPHONE (OUTPATIENT)
Dept: INTERNAL MEDICINE CLINIC | Age: 71
End: 2021-06-14

## 2021-06-14 RX ORDER — TERBINAFINE HYDROCHLORIDE 250 MG/1
250 TABLET ORAL DAILY
Qty: 84 TABLET | Refills: 0 | Status: SHIPPED | OUTPATIENT
Start: 2021-06-14 | End: 2021-09-09

## 2021-06-14 ASSESSMENT — ENCOUNTER SYMPTOMS
CHEST TIGHTNESS: 0
DIARRHEA: 0
SHORTNESS OF BREATH: 0
CONSTIPATION: 0

## 2021-06-14 NOTE — ASSESSMENT & PLAN NOTE
Avoid NSAIDs due to chronic kidney disease. Continue prednisone 5 mg daily. May also use topical Voltaren gel. Encouraged to follow-up with orthopedics.

## 2021-06-14 NOTE — ASSESSMENT & PLAN NOTE
Patient is compliant with CPAP. Her blood work is unremarkable. Her blood pressure is well controlled. Suspect fatigue relates to chronic pain. Patient does not wish to take narcotics as her son has struggled with addiction.

## 2021-06-14 NOTE — ASSESSMENT & PLAN NOTE
Patient continues to have significant low back and neck pain. She does not wish to take narcotics due to her son's history of opioid addiction. She has not had improvement with surgery or a spinal stimulator nor has she had relief with epidural injections in the past.  She is unable to take NSAIDs due to chronic kidney disease. She remains on gabapentin 300 mg nightly. Did not feel that Cymbalta was helpful and is weaning off of it. Has had some improvement in symptoms of pain on prednisone 5 mg daily. Patient is aware of the risks of long-term prednisone use.

## 2021-06-14 NOTE — ASSESSMENT & PLAN NOTE
Felt like she did better with Lexapro than Cymbalta. She is currently down to Cymbalta 60 mg daily. Wean Cymbalta 30 mg daily x2 weeks and then 30 mg every other day x2 weeks and then discontinue. Start Lexapro 10 mg daily when on Cymbalta 30 mg every other day and then increase Lexapro to 20 mg daily when off of Cymbalta.

## 2021-06-14 NOTE — TELEPHONE ENCOUNTER
Patient states she had appointment with Dr Chinyere Moreland last week and she thought Dr Chinyere Moreland was going to prescribe a medication for toenail fungus for her. Pharmacy has not received. She states she uses [facility] in [place], [place].

## 2021-06-15 ENCOUNTER — TELEPHONE (OUTPATIENT)
Dept: INTERNAL MEDICINE CLINIC | Age: 71
End: 2021-06-15

## 2021-06-15 RX ORDER — CEPHALEXIN 250 MG/1
250 CAPSULE ORAL 3 TIMES DAILY
Qty: 21 CAPSULE | Refills: 0 | Status: SHIPPED | OUTPATIENT
Start: 2021-06-15 | End: 2021-06-22

## 2021-06-15 NOTE — TELEPHONE ENCOUNTER
Pt calling because last night when she went to the bathroom she had some burning when she went. This morning her urine was cloudy and she has the urgent se to go. She think its a start to a UTI and she is leaving out of time Friday. Wanted to know if she came come in to do a urine spec. Or if she has to be seen. Please advise.

## 2021-07-23 ENCOUNTER — OFFICE VISIT (OUTPATIENT)
Dept: INTERNAL MEDICINE CLINIC | Age: 71
End: 2021-07-23
Payer: MEDICARE

## 2021-07-23 VITALS
BODY MASS INDEX: 32.58 KG/M2 | SYSTOLIC BLOOD PRESSURE: 140 MMHG | HEART RATE: 64 BPM | OXYGEN SATURATION: 95 % | DIASTOLIC BLOOD PRESSURE: 92 MMHG | WEIGHT: 195.8 LBS

## 2021-07-23 DIAGNOSIS — M54.12 CERVICAL RADICULOPATHY: ICD-10-CM

## 2021-07-23 DIAGNOSIS — Z86.16 HISTORY OF COVID-19: ICD-10-CM

## 2021-07-23 DIAGNOSIS — F41.9 ANXIETY AND DEPRESSION: Primary | ICD-10-CM

## 2021-07-23 DIAGNOSIS — G89.4 CHRONIC PAIN DISORDER: ICD-10-CM

## 2021-07-23 DIAGNOSIS — M25.562 CHRONIC PAIN OF BOTH KNEES: ICD-10-CM

## 2021-07-23 DIAGNOSIS — G89.29 CHRONIC PAIN OF BOTH KNEES: ICD-10-CM

## 2021-07-23 DIAGNOSIS — M51.36 DDD (DEGENERATIVE DISC DISEASE), LUMBAR: ICD-10-CM

## 2021-07-23 DIAGNOSIS — M25.561 CHRONIC PAIN OF BOTH KNEES: ICD-10-CM

## 2021-07-23 DIAGNOSIS — F32.A ANXIETY AND DEPRESSION: Primary | ICD-10-CM

## 2021-07-23 PROCEDURE — G8427 DOCREV CUR MEDS BY ELIG CLIN: HCPCS | Performed by: INTERNAL MEDICINE

## 2021-07-23 PROCEDURE — 1036F TOBACCO NON-USER: CPT | Performed by: INTERNAL MEDICINE

## 2021-07-23 PROCEDURE — G8400 PT W/DXA NO RESULTS DOC: HCPCS | Performed by: INTERNAL MEDICINE

## 2021-07-23 PROCEDURE — 1123F ACP DISCUSS/DSCN MKR DOCD: CPT | Performed by: INTERNAL MEDICINE

## 2021-07-23 PROCEDURE — 4040F PNEUMOC VAC/ADMIN/RCVD: CPT | Performed by: INTERNAL MEDICINE

## 2021-07-23 PROCEDURE — G8417 CALC BMI ABV UP PARAM F/U: HCPCS | Performed by: INTERNAL MEDICINE

## 2021-07-23 PROCEDURE — 1090F PRES/ABSN URINE INCON ASSESS: CPT | Performed by: INTERNAL MEDICINE

## 2021-07-23 PROCEDURE — 99214 OFFICE O/P EST MOD 30 MIN: CPT | Performed by: INTERNAL MEDICINE

## 2021-07-23 PROCEDURE — 3017F COLORECTAL CA SCREEN DOC REV: CPT | Performed by: INTERNAL MEDICINE

## 2021-07-23 RX ORDER — PREDNISONE 1 MG/1
5 TABLET ORAL DAILY
Qty: 90 TABLET | Refills: 1 | Status: SHIPPED | OUTPATIENT
Start: 2021-07-23 | End: 2021-12-28

## 2021-07-23 NOTE — ASSESSMENT & PLAN NOTE
Continue Lexapro 20 mg daily. Patient is suffering from depression primarily because of pain. Referral placed to Dr. Leonor Damon for biofeedback which will hopefully help with her pain as well as her depression. Also encourage patient to reach out to Dr. Pilar Miller at [address] [place] [address], who placed her spinal stimulator, to see if she could be referred back to the same psychologist that she saw prior to having stimulator placed.

## 2021-07-23 NOTE — ASSESSMENT & PLAN NOTE
Patient had COVID-19 in December 2020. Discussed COVID-19 vaccination and need for COVID-19 vaccination despite having had COVID-19. Patient may consider.

## 2021-07-23 NOTE — PROGRESS NOTES
Patient: Mariam Magallanes is a 70 y.o. female who presents today with the following Chief Complaint(s):  No chief complaint on file. HPI     Here today for follow up:    Depression- Changed from Cymbalta to Lexapro. Does think that her depression is doing a little better but is struggling because of pain. Did see a psychologist prior to getting her spinal stimulator. Would like to work with them again if possible. Is not sure that the prednisone is helping her with her pain. Is open to seeing pain management but really does not want anything \"very addictive\" for pain d/t son's h/o addiction. Would like to see ortho for her knees. 30 minutes were spent with patient today in face to face encounter.       Allergies   Allergen Reactions    Compazine [Prochlorperazine Maleate] Shortness Of Breath     dyspnea    Morphine Nausea And Vomiting     N&V      Past Medical History:   Diagnosis Date    Back pain, chronic     Depression     Joint pain     Sleep apnea       Past Surgical History:   Procedure Laterality Date    LUMBAR FUSION  2016    PARTIAL HYSTERECTOMY  2002    SPINAL CORD STIMULATOR SURGERY      Transmit receive head coil only      Social History     Socioeconomic History    Marital status:      Spouse name: Not on file    Number of children: Not on file    Years of education: Not on file    Highest education level: Not on file   Occupational History    Occupation: retired   Tobacco Use    Smoking status: Never Smoker    Smokeless tobacco: Never Used   Substance and Sexual Activity    Alcohol use: No    Drug use: No    Sexual activity: Not on file   Other Topics Concern    Not on file   Social History Narrative    Not on file     Social Determinants of Health     Financial Resource Strain: Low Risk     Difficulty of Paying Living Expenses: Not hard at all   Food Insecurity: No Food Insecurity    Worried About [address] [email or web address] in the Last Year: Never true   [employer] in the Last Year: Never true   Transportation Needs: No Transportation Needs    Lack of Transportation (Medical): No    Lack of Transportation (Non-Medical): No   Physical Activity:     Days of Exercise per Week:     Minutes of Exercise per Session:    Stress:     Feeling of Stress :    Social Connections:     Frequency of Communication with Friends and Family:     Frequency of Social Gatherings with Friends and Family:     Attends Alevism Services:     Active Member of Clubs or Organizations:     Attends Club or Organization Meetings:     Marital Status:    Intimate Partner Violence:     Fear of Current or Ex-Partner:     Emotionally Abused:     Physically Abused:     Sexually Abused:      Family History   Problem Relation Age of Onset    Heart Failure Mother     COPD Father         Outpatient Medications Prior to Visit   Medication Sig Dispense Refill    terbinafine (LAMISIL) 250 MG tablet Take 1 tablet by mouth daily 84 tablet 0    escitalopram (LEXAPRO) 20 MG tablet Take 1 tablet by mouth daily 90 tablet 3    rosuvastatin (CRESTOR) 20 MG tablet Take 1 tablet by mouth daily 90 tablet 3    metFORMIN (GLUCOPHAGE-XR) 500 MG extended release tablet Take 1 tablet by mouth 2 times daily (with meals) 180 tablet 1    omeprazole (PRILOSEC) 20 MG delayed release capsule Take 1 capsule by mouth Daily = 90 capsule 3    vitamin D (ERGOCALCIFEROL) 1.25 MG (86207 UT) CAPS capsule Take 1 capsule by mouth once a week 12 capsule 3    metoprolol succinate (TOPROL XL) 100 MG extended release tablet Take 1 tablet by mouth 2 times daily 180 tablet 3    telmisartan (MICARDIS) 80 MG tablet Take 1 tablet by mouth daily 90 tablet 3    niacin 500 MG extended release capsule Take 500 mg by mouth nightly      Magnesium Hydroxide (MAGNESIA PO) Take 50 mg by mouth daily       CPAP Machine MISC by Does not apply route      gabapentin (NEURONTIN) 300 MG capsule Take 1 capsule by mouth nightly for 90 days. 90 capsule 3    predniSONE (DELTASONE) 5 MG tablet Take 1 tablet by mouth daily 90 tablet 1     No facility-administered medications prior to visit. Patient'spast medical history, surgical history, family history, medications,  and allergies  were all reviewed and updated as appropriate today. Review of Systems    BP (!) 140/92   Pulse 64   Wt 195 lb 12.8 oz (88.8 kg)   SpO2 95%   BMI 32.58 kg/m²   Physical Exam  Constitutional:       General: She is not in acute distress. Appearance: Normal appearance. She is not ill-appearing. Neurological:      Mental Status: She is alert. Psychiatric:         Attention and Perception: Attention normal.         Mood and Affect: Mood is depressed. Mood is not anxious. Affect is not tearful. Speech: Speech normal.         Behavior: Behavior normal.         Thought Content: Thought content normal.         Cognition and Memory: Cognition and memory normal.         ASSESSMENT/PLAN:    Problem List Items Addressed This Visit     Anxiety and depression - Primary     Continue Lexapro 20 mg daily. Patient is suffering from depression primarily because of pain. Referral placed to Dr. Jessie Howard for biofeedback which will hopefully help with her pain as well as her depression. Also encourage patient to reach out to Dr. Hany Ramirez at [facility] [place], who placed her spinal stimulator, to see if she could be referred back to the same psychologist that she saw prior to having stimulator placed. Relevant Orders    External Referral To Psychology    Cervical radiculopathy      Patient continues to have significant low back and neck pain. She is now agreeable to referral to pain management to see if she would get relief from a low-dose narcotic with lower addiction potential.  She is very concerned as her son has a history of narcotics addiction and she runs an [employer] program.  Patient is going to continue on prednisone 5 mg daily.   She is unfortunately unable to take NSAIDs due to her chronic kidney disease. Referral placed to Dr. Travis Tong. Relevant Orders    External Referral To Psychology    AD Gonsalez MD, Pain Management, [facility]    Chronic pain disorder       Patient continues to have significant low back and neck pain. She is now agreeable to referral to pain management to see if she would get relief from a low-dose narcotic with lower addiction potential.  She is very concerned as her son has a history of narcotics addiction and she runs an [employer] program.  Patient is going to continue on prednisone 5 mg daily. She is unfortunately unable to take NSAIDs due to her chronic kidney disease. Referral placed to Dr. Travis Tong for pain management and Dr. Tate Benavides for biofeedback training. Relevant Medications    predniSONE (DELTASONE) 5 MG tablet    Other Relevant Orders    [facility]    Chronic pain of both knees     Referral placed to Dr. Jaida Butts per patient request.          Relevant Medications    predniSONE (DELTASONE) 5 MG tablet    Other Relevant Orders    [facility]    External Referral To Orthopedic Surgery    DDD (degenerative disc disease), lumbar      Patient continues to have significant low back and neck pain. She is now agreeable to referral to pain management to see if she would get relief from a low-dose narcotic with lower addiction potential.  She is very concerned as her son has a history of narcotics addiction and she runs an Al-[employer] program.  Patient is going to continue on prednisone 5 mg daily. She is unfortunately unable to take NSAIDs due to her chronic kidney disease. Referral placed to Dr. Travis Tong.           Relevant Medications    predniSONE (DELTASONE) 5 MG tablet    Other Relevant Orders    External Referral To Psychology    [facility] History of COVID-19     Patient had COVID-19 in December 2020. Discussed COVID-19 vaccination and need for COVID-19 vaccination despite having had COVID-19. Patient may consider. Current Outpatient Medications   Medication Sig Dispense Refill    predniSONE (DELTASONE) 5 MG tablet Take 1 tablet by mouth daily 90 tablet 1    terbinafine (LAMISIL) 250 MG tablet Take 1 tablet by mouth daily 84 tablet 0    escitalopram (LEXAPRO) 20 MG tablet Take 1 tablet by mouth daily 90 tablet 3    rosuvastatin (CRESTOR) 20 MG tablet Take 1 tablet by mouth daily 90 tablet 3    metFORMIN (GLUCOPHAGE-XR) 500 MG extended release tablet Take 1 tablet by mouth 2 times daily (with meals) 180 tablet 1    omeprazole (PRILOSEC) 20 MG delayed release capsule Take 1 capsule by mouth Daily = 90 capsule 3    vitamin D (ERGOCALCIFEROL) 1.25 MG (50019 UT) CAPS capsule Take 1 capsule by mouth once a week 12 capsule 3    metoprolol succinate (TOPROL XL) 100 MG extended release tablet Take 1 tablet by mouth 2 times daily 180 tablet 3    telmisartan (MICARDIS) 80 MG tablet Take 1 tablet by mouth daily 90 tablet 3    niacin 500 MG extended release capsule Take 500 mg by mouth nightly      Magnesium Hydroxide (MAGNESIA PO) Take 50 mg by mouth daily       CPAP Machine MISC by Does not apply route      gabapentin (NEURONTIN) 300 MG capsule Take 1 capsule by mouth nightly for 90 days. 90 capsule 3     No current facility-administered medications for this visit. Return in about 12 weeks (around 10/15/2021).

## 2021-07-23 NOTE — ASSESSMENT & PLAN NOTE
Patient continues to have significant low back and neck pain. She is now agreeable to referral to pain management to see if she would get relief from a low-dose narcotic with lower addiction potential.  She is very concerned as her son has a history of narcotics addiction and she runs an [employer] program.  Patient is going to continue on prednisone 5 mg daily. She is unfortunately unable to take NSAIDs due to her chronic kidney disease. Referral placed to Dr. Roselia Farias for pain management and Dr. Dayday Kirby for biofeedback training.

## 2021-07-23 NOTE — ASSESSMENT & PLAN NOTE
Patient continues to have significant low back and neck pain. She is now agreeable to referral to pain management to see if she would get relief from a low-dose narcotic with lower addiction potential.  She is very concerned as her son has a history of narcotics addiction and she runs an [employer] program.  Patient is going to continue on prednisone 5 mg daily. She is unfortunately unable to take NSAIDs due to her chronic kidney disease. Referral placed to Dr. Talya Maldonado.

## 2021-08-11 ENCOUNTER — HOSPITAL ENCOUNTER (OUTPATIENT)
Age: 71
Discharge: HOME | End: 2021-08-11
Payer: MEDICARE

## 2021-08-11 ENCOUNTER — HOSPITAL ENCOUNTER (OUTPATIENT)
Dept: GENERAL RADIOLOGY | Age: 71
Discharge: HOME | End: 2021-08-11
Payer: MEDICARE

## 2021-08-11 DIAGNOSIS — R52 PAIN: ICD-10-CM

## 2021-08-11 PROCEDURE — 72110 X-RAY EXAM L-2 SPINE 4/>VWS: CPT

## 2021-09-09 RX ORDER — TERBINAFINE HYDROCHLORIDE 250 MG/1
TABLET ORAL
Qty: 84 TABLET | Refills: 0 | Status: SHIPPED | OUTPATIENT
Start: 2021-09-09 | End: 2021-10-12 | Stop reason: ALTCHOICE

## 2021-09-22 ENCOUNTER — TELEPHONE (OUTPATIENT)
Dept: INTERNAL MEDICINE CLINIC | Age: 71
End: 2021-09-22

## 2021-09-22 NOTE — TELEPHONE ENCOUNTER
Pt calling about the [facility] pharmacy keeps calling her to  but she didn't know she was suppose to keep taking---please call the pt. Thanks.

## 2021-10-12 ENCOUNTER — OFFICE VISIT (OUTPATIENT)
Dept: INTERNAL MEDICINE CLINIC | Age: 71
End: 2021-10-12
Payer: MEDICARE

## 2021-10-12 VITALS — DIASTOLIC BLOOD PRESSURE: 94 MMHG | HEART RATE: 66 BPM | SYSTOLIC BLOOD PRESSURE: 164 MMHG

## 2021-10-12 DIAGNOSIS — E11.9 TYPE 2 DIABETES MELLITUS WITHOUT COMPLICATION, WITHOUT LONG-TERM CURRENT USE OF INSULIN (HCC): Primary | ICD-10-CM

## 2021-10-12 DIAGNOSIS — Z86.16 HISTORY OF COVID-19: ICD-10-CM

## 2021-10-12 DIAGNOSIS — E78.5 HYPERLIPIDEMIA LDL GOAL <70: ICD-10-CM

## 2021-10-12 DIAGNOSIS — I10 ESSENTIAL HYPERTENSION: ICD-10-CM

## 2021-10-12 DIAGNOSIS — M51.36 DDD (DEGENERATIVE DISC DISEASE), LUMBAR: ICD-10-CM

## 2021-10-12 DIAGNOSIS — F32.A ANXIETY AND DEPRESSION: ICD-10-CM

## 2021-10-12 DIAGNOSIS — Z00.00 ROUTINE GENERAL MEDICAL EXAMINATION AT A HEALTH CARE FACILITY: Primary | ICD-10-CM

## 2021-10-12 DIAGNOSIS — M54.12 CERVICAL RADICULOPATHY: ICD-10-CM

## 2021-10-12 DIAGNOSIS — F41.9 ANXIETY AND DEPRESSION: ICD-10-CM

## 2021-10-12 DIAGNOSIS — M17.11 PRIMARY OSTEOARTHRITIS OF RIGHT KNEE: ICD-10-CM

## 2021-10-12 PROCEDURE — 1036F TOBACCO NON-USER: CPT | Performed by: INTERNAL MEDICINE

## 2021-10-12 PROCEDURE — 4040F PNEUMOC VAC/ADMIN/RCVD: CPT | Performed by: INTERNAL MEDICINE

## 2021-10-12 PROCEDURE — G0439 PPPS, SUBSEQ VISIT: HCPCS | Performed by: INTERNAL MEDICINE

## 2021-10-12 PROCEDURE — 3044F HG A1C LEVEL LT 7.0%: CPT | Performed by: INTERNAL MEDICINE

## 2021-10-12 PROCEDURE — 1123F ACP DISCUSS/DSCN MKR DOCD: CPT | Performed by: INTERNAL MEDICINE

## 2021-10-12 PROCEDURE — G8428 CUR MEDS NOT DOCUMENT: HCPCS | Performed by: INTERNAL MEDICINE

## 2021-10-12 PROCEDURE — G8484 FLU IMMUNIZE NO ADMIN: HCPCS | Performed by: INTERNAL MEDICINE

## 2021-10-12 PROCEDURE — G0009 ADMIN PNEUMOCOCCAL VACCINE: HCPCS | Performed by: INTERNAL MEDICINE

## 2021-10-12 PROCEDURE — G8400 PT W/DXA NO RESULTS DOC: HCPCS | Performed by: INTERNAL MEDICINE

## 2021-10-12 PROCEDURE — 3017F COLORECTAL CA SCREEN DOC REV: CPT | Performed by: INTERNAL MEDICINE

## 2021-10-12 PROCEDURE — G8417 CALC BMI ABV UP PARAM F/U: HCPCS | Performed by: INTERNAL MEDICINE

## 2021-10-12 PROCEDURE — 2022F DILAT RTA XM EVC RTNOPTHY: CPT | Performed by: INTERNAL MEDICINE

## 2021-10-12 PROCEDURE — 1090F PRES/ABSN URINE INCON ASSESS: CPT | Performed by: INTERNAL MEDICINE

## 2021-10-12 PROCEDURE — 90670 PCV13 VACCINE IM: CPT | Performed by: INTERNAL MEDICINE

## 2021-10-12 PROCEDURE — 99214 OFFICE O/P EST MOD 30 MIN: CPT | Performed by: INTERNAL MEDICINE

## 2021-10-12 RX ORDER — LANCETS 30 GAUGE
1 EACH MISCELLANEOUS DAILY
Qty: 100 EACH | Refills: 5 | Status: SHIPPED | OUTPATIENT
Start: 2021-10-12

## 2021-10-12 RX ORDER — GLUCOSAMINE HCL/CHONDROITIN SU 500-400 MG
CAPSULE ORAL
Qty: 100 STRIP | Refills: 3 | Status: SHIPPED | OUTPATIENT
Start: 2021-10-12

## 2021-10-12 SDOH — ECONOMIC STABILITY: FOOD INSECURITY: WITHIN THE PAST 12 MONTHS, THE FOOD YOU BOUGHT JUST DIDN'T LAST AND YOU DIDN'T HAVE MONEY TO GET MORE.: NEVER TRUE

## 2021-10-12 SDOH — ECONOMIC STABILITY: FOOD INSECURITY: WITHIN THE PAST 12 MONTHS, YOU WORRIED THAT YOUR FOOD WOULD RUN OUT BEFORE YOU GOT MONEY TO BUY MORE.: NEVER TRUE

## 2021-10-12 ASSESSMENT — PATIENT HEALTH QUESTIONNAIRE - PHQ9
1. LITTLE INTEREST OR PLEASURE IN DOING THINGS: 1
SUM OF ALL RESPONSES TO PHQ9 QUESTIONS 1 & 2: 2
SUM OF ALL RESPONSES TO PHQ QUESTIONS 1-9: 2
2. FEELING DOWN, DEPRESSED OR HOPELESS: 1
SUM OF ALL RESPONSES TO PHQ QUESTIONS 1-9: 2
SUM OF ALL RESPONSES TO PHQ QUESTIONS 1-9: 2

## 2021-10-12 ASSESSMENT — LIFESTYLE VARIABLES: HOW OFTEN DO YOU HAVE A DRINK CONTAINING ALCOHOL: 0

## 2021-10-12 ASSESSMENT — ENCOUNTER SYMPTOMS
DIARRHEA: 0
CHEST TIGHTNESS: 0
SHORTNESS OF BREATH: 0
CONSTIPATION: 0

## 2021-10-12 ASSESSMENT — SOCIAL DETERMINANTS OF HEALTH (SDOH): HOW HARD IS IT FOR YOU TO PAY FOR THE VERY BASICS LIKE FOOD, HOUSING, MEDICAL CARE, AND HEATING?: NOT HARD AT ALL

## 2021-10-12 NOTE — PATIENT INSTRUCTIONS
Low blood sugar symptoms:  - fatigue  - confusion  - hunger  - sweating  - nausea  - shaky    If you feel like this, check your blood sugar. If your sugar is under 100, drink 4 oz (1/2 cup) of regular soda or juice and eat a snack with fat and protein (peanut butter and crackers, cheese and crackers). If it is over 100, just eat a snack with fat and protein.

## 2021-10-12 NOTE — PROGRESS NOTES
Patient: Rohini Alegre is a 70 y.o. female who presents today with the following Chief Complaint(s):  No chief complaint on file. HPI     Here today for follow up. Did see Dr. Vikas Baeza for pain management- had injection in her back, did not work. Has not followed up. Has not followed up with ortho for her knee. Was waiting to see if Dr. Vikas Baeza could give her an injection in her knee before seeing ortho. HTN- BP is usually not elevated. DM- diet is \"ok\". Doing ok on metformin. HLD- no issues with Crestor. Depression- fair on Lexapro     Normal mammogram yesterday. Does get really hot and sweaty for no reason. Labs not yet done.      Allergies   Allergen Reactions    Compazine [Prochlorperazine Maleate] Shortness Of Breath     dyspnea    Morphine Nausea And Vomiting     N&V      Past Medical History:   Diagnosis Date    Back pain, chronic     Depression     Joint pain     Sleep apnea       Past Surgical History:   Procedure Laterality Date    LUMBAR FUSION  2016    PARTIAL HYSTERECTOMY  2002    SPINAL CORD STIMULATOR SURGERY      Transmit receive head coil only      Social History     Socioeconomic History    Marital status:      Spouse name: Not on file    Number of children: Not on file    Years of education: Not on file    Highest education level: Not on file   Occupational History    Occupation: retired   Tobacco Use    Smoking status: Never Smoker    Smokeless tobacco: Never Used   Substance and Sexual Activity    Alcohol use: No    Drug use: No    Sexual activity: Not on file   Other Topics Concern    Not on file   Social History Narrative    Not on file     Social Determinants of Health     Financial Resource Strain: Low Risk     Difficulty of Paying Living Expenses: Not hard at all   Food Insecurity: No Food Insecurity    Worried About [address] [employer] in the Last Year: Never true    [facility] in the Last Year: Never true not apply route       No facility-administered medications prior to visit. Patient'spast medical history, surgical history, family history, medications,  and allergies  were all reviewed and updated as appropriate today. Review of Systems   Constitutional: Negative for appetite change, fatigue and fever. Respiratory: Negative for chest tightness and shortness of breath. Cardiovascular: Negative for chest pain. Gastrointestinal: Negative for constipation and diarrhea. Skin: Negative for rash. BP (!) 164/94   Pulse 66   Physical Exam  Vitals and nursing note reviewed. Constitutional:       Appearance: She is well-developed. She is not toxic-appearing. HENT:      Head: Normocephalic. Right Ear: Tympanic membrane, ear canal and external ear normal.      Left Ear: Tympanic membrane, ear canal and external ear normal.      Mouth/Throat:      Pharynx: No oropharyngeal exudate or posterior oropharyngeal erythema. Eyes:      General: No scleral icterus. Extraocular Movements: Extraocular movements intact. Conjunctiva/sclera: Conjunctivae normal.      Pupils: Pupils are equal, round, and reactive to light. Neck:      Thyroid: No thyroid mass or thyromegaly. Vascular: No carotid bruit. Cardiovascular:      Rate and Rhythm: Normal rate and regular rhythm. Heart sounds: Normal heart sounds. No murmur heard. Pulmonary:      Effort: Pulmonary effort is normal.      Breath sounds: Normal breath sounds. Musculoskeletal:      Right lower leg: No edema. Left lower leg: No edema. Lymphadenopathy:      Cervical: No cervical adenopathy. Neurological:      General: No focal deficit present. Mental Status: She is alert and oriented to person, place, and time. Psychiatric:         Mood and Affect: Mood normal.         Behavior: Behavior normal. Behavior is cooperative.          ASSESSMENT/PLAN:    Problem List Items Addressed This Visit     Anxiety and depression     Continues to do fair on Lexapro. Pain is not significant contributing factor to her depression. Cervical radiculopathy     Continue prednisone 5 mg daily and gabapentin 300 mg nightly. DDD (degenerative disc disease), lumbar     Patient is now following with Dr. Minda Diallo for pain management. She had an injection in her back to did not help. She has not yet followed up. Continue prednisone 5 mg daily and gabapentin 300 mg nightly. Patient declines narcotics due to family history of substance abuse in her son. Essential hypertension      Elevated today but is typically well controlled. Continue Micardis 80 mg daily and metoprolol  mg twice daily. May need to resume amlodipine. Advised patient to monitor blood pressure at home. History of COVID-19      Patient had COVID-19 in December 2020. Patient declines COVID-19 vaccination, stating that she just does not trust the government. Discussed importance of COVID-19 vaccination and unknown length of natural immunity. Patient is unlikely to get her COVID-19 vaccine. Discussed need to continue wearing masks, particularly with delta variant. Hyperlipidemia LDL goal <70      Continue Crestor 10 mg daily. Labs will be drawn at a later date. Primary osteoarthritis of right knee      Encourage patient to follow-up with orthopedics. Type 2 diabetes mellitus without complication, without long-term current use of insulin ([address] - Primary     Working on diet. Continue Metformin  mg twice daily. Due for hemoglobin A1c. Reviewed signs, symptoms, and treatment of hypoglycemia.          Relevant Medications    blood glucose monitor kit and supplies    blood glucose monitor strips    Lancets MISC    Other Relevant Orders    Comprehensive Metabolic Panel    Hemoglobin A1C          Current Outpatient Medications   Medication Sig Dispense Refill    blood glucose monitor kit and supplies Check sugars qd and prn 1 kit 0    blood glucose monitor strips Test 1 times a day & as needed for symptoms of irregular blood glucose. 100 strip 3    Lancets MISC 1 each by Does not apply route daily 100 each 5    metFORMIN (GLUCOPHAGE-XR) 500 MG extended release tablet TAKE 1 TABLET TWICE A DAY WITH MEALS 180 tablet 1    predniSONE (DELTASONE) 5 MG tablet Take 1 tablet by mouth daily 90 tablet 1    escitalopram (LEXAPRO) 20 MG tablet Take 1 tablet by mouth daily 90 tablet 3    rosuvastatin (CRESTOR) 20 MG tablet Take 1 tablet by mouth daily 90 tablet 3    gabapentin (NEURONTIN) 300 MG capsule Take 1 capsule by mouth nightly for 90 days. 90 capsule 3    omeprazole (PRILOSEC) 20 MG delayed release capsule Take 1 capsule by mouth Daily = 90 capsule 3    vitamin D (ERGOCALCIFEROL) 1.25 MG (84720 UT) CAPS capsule Take 1 capsule by mouth once a week 12 capsule 3    metoprolol succinate (TOPROL XL) 100 MG extended release tablet Take 1 tablet by mouth 2 times daily 180 tablet 3    telmisartan (MICARDIS) 80 MG tablet Take 1 tablet by mouth daily 90 tablet 3    niacin 500 MG extended release capsule Take 500 mg by mouth nightly      Magnesium Hydroxide (MAGNESIA PO) Take 50 mg by mouth daily       CPAP Machine MISC by Does not apply route       No current facility-administered medications for this visit. Return in about 4 months (around 2/12/2022).

## 2021-10-17 PROBLEM — M17.11 PRIMARY OSTEOARTHRITIS OF RIGHT KNEE: Status: ACTIVE | Noted: 2021-10-17

## 2021-10-18 VITALS
HEIGHT: 65 IN | DIASTOLIC BLOOD PRESSURE: 92 MMHG | WEIGHT: 196.6 LBS | BODY MASS INDEX: 32.76 KG/M2 | SYSTOLIC BLOOD PRESSURE: 154 MMHG

## 2021-10-18 ASSESSMENT — PATIENT HEALTH QUESTIONNAIRE - PHQ9
SUM OF ALL RESPONSES TO PHQ QUESTIONS 1-9: 2
1. LITTLE INTEREST OR PLEASURE IN DOING THINGS: 1
SUM OF ALL RESPONSES TO PHQ9 QUESTIONS 1 & 2: 2
2. FEELING DOWN, DEPRESSED OR HOPELESS: 1
SUM OF ALL RESPONSES TO PHQ QUESTIONS 1-9: 2
SUM OF ALL RESPONSES TO PHQ QUESTIONS 1-9: 2

## 2021-10-18 NOTE — PATIENT INSTRUCTIONS
Personalized Preventive Plan for Rohini Alegre - 10/12/2021  Medicare offers a range of preventive health benefits. Some of the tests and screenings are paid in full while other may be subject to a deductible, co-insurance, and/or copay. Some of these benefits include a comprehensive review of your medical history including lifestyle, illnesses that may run in your family, and various assessments and screenings as appropriate. After reviewing your medical record and screening and assessments performed today your provider may have ordered immunizations, labs, imaging, and/or referrals for you. A list of these orders (if applicable) as well as your Preventive Care list are included within your After Visit Summary for your review. Other Preventive Recommendations:    · A preventive eye exam performed by an eye specialist is recommended every 1-2 years to screen for glaucoma; cataracts, macular degeneration, and other eye disorders. · A preventive dental visit is recommended every 6 months. · Try to get at least 150 minutes of exercise per week or 10,000 steps per day on a pedometer . · Order or download the FREE \"Exercise & Physical Activity: Your Everyday Guide\" from [employer]. Call 6-639.988.6369 or search [employer] online. · You need 1978-8068 mg of calcium and 5193-8060 IU of vitamin D per day. It is possible to meet your calcium requirement with diet alone, but a vitamin D supplement is usually necessary to meet this goal.  · When exposed to the sun, use a sunscreen that protects against both UVA and UVB radiation with an SPF of 30 or greater. Reapply every 2 to 3 hours or after sweating, drying off with a towel, or swimming. · Always wear a seat belt when traveling in a car. Always wear a helmet when riding a bicycle or motorcycle.

## 2021-10-18 NOTE — ASSESSMENT & PLAN NOTE
Patient is now following with Dr. Haja Mckoy for pain management. She had an injection in her back to did not help. She has not yet followed up. Continue prednisone 5 mg daily and gabapentin 300 mg nightly. Patient declines narcotics due to family history of substance abuse in her son.

## 2021-10-18 NOTE — ASSESSMENT & PLAN NOTE
Working on diet. Continue Metformin  mg twice daily. Due for hemoglobin A1c. Reviewed signs, symptoms, and treatment of hypoglycemia.

## 2021-10-18 NOTE — ASSESSMENT & PLAN NOTE
Patient had COVID-19 in December 2020. Patient declines COVID-19 vaccination, stating that she just does not trust the government. Discussed importance of COVID-19 vaccination and unknown length of natural immunity. Patient is unlikely to get her COVID-19 vaccine. Discussed need to continue wearing masks, particularly with delta variant.

## 2021-10-18 NOTE — PROGRESS NOTES
Medicare Annual Wellness Visit  Name: Humaira Barba Date: 10/18/2021   MRN: [id number] Sex: Female   Age: 70 y.o. Ethnicity: Unavailable / Unknown   : 1950 Race: White (non-)      Orville Salvador is here for Medicare AWV    Screenings for behavioral, psychosocial and functional/safety risks, and cognitive dysfunction are all negative except as indicated below. These results, as well as other patient data from the [address] form, are documented in Flowsheets linked to this Encounter. Allergies   Allergen Reactions    Compazine [Prochlorperazine Maleate] Shortness Of Breath     dyspnea    Morphine Nausea And Vomiting     N&V       Prior to Visit Medications    Medication Sig Taking? Authorizing Provider   blood glucose monitor kit and supplies Check sugars qd and prn  Mere Distad, DO   blood glucose monitor strips Test 1 times a day & as needed for symptoms of irregular blood glucose. Mere Distad, DO   Lancets MISC 1 each by Does not apply route daily  Mere Distad, DO   metFORMIN (GLUCOPHAGE-XR) 500 MG extended release tablet TAKE 1 TABLET TWICE A DAY WITH MEALS  Mere Distad, DO   predniSONE (DELTASONE) 5 MG tablet Take 1 tablet by mouth daily  Mere Distad, DO   escitalopram (LEXAPRO) 20 MG tablet Take 1 tablet by mouth daily  Mere Distad, DO   rosuvastatin (CRESTOR) 20 MG tablet Take 1 tablet by mouth daily  Mere Distad, DO   gabapentin (NEURONTIN) 300 MG capsule Take 1 capsule by mouth nightly for 90 days.   Mere Distad, DO   omeprazole (PRILOSEC) 20 MG delayed release capsule Take 1 capsule by mouth Daily =  Mere Distad, DO   vitamin D (ERGOCALCIFEROL) 1.25 MG (14447 UT) CAPS capsule Take 1 capsule by mouth once a week  Mere Distad, DO   metoprolol succinate (TOPROL XL) 100 MG extended release tablet Take 1 tablet by mouth 2 times daily  Mere Distad, DO   telmisartan (MICARDIS) 80 MG tablet Take 1 tablet by mouth daily  Speedy Jo DO   niacin 500 MG extended release capsule Take 500 mg by mouth nightly  Historical Provider, MD   Magnesium Hydroxide (MAGNESIA PO) Take 50 mg by mouth daily   Historical Provider, MD   CPAP Machine MISC by Does not apply route  Historical Provider, MD       Past Medical History:   Diagnosis Date    Back pain, chronic     Depression     Joint pain     Sleep apnea        Past Surgical History:   Procedure Laterality Date    LUMBAR FUSION  2016    PARTIAL HYSTERECTOMY  2002    SPINAL CORD STIMULATOR SURGERY      Transmit receive head coil only       Family History   Problem Relation Age of Onset    Heart Failure Mother     COPD Father        CareTeam (Including outside providers/suppliers regularly involved in providing care):   Patient Care Team:  Speedy Jo DO as PCP - General (Internal Medicine)  Speedy Jo DO as PCP - [facility] Empaneled Provider    Wt Readings from Last 3 Encounters:   10/18/21 196 lb 9.6 oz (89.2 kg)   07/23/21 195 lb 12.8 oz (88.8 kg)   06/08/21 193 lb (87.5 kg)     Vitals:    10/12/21 1315 10/18/21 0735   BP: (!) 154/92    Weight: 196 lb 6 oz (89.1 kg) 196 lb 9.6 oz (89.2 kg)   Height: 5' 5\" (1.651 m) 5' 5\" (1.651 m)     Body mass index is 32.72 kg/m². Based upon direct observation of the patient, evaluation of cognition reveals recent and remote memory intact. Patient's complete Health Risk Assessment and screening values have been reviewed and are found in Flowsheets. The following problems were reviewed today and where indicated follow up appointments were made and/or referrals ordered.     Positive Risk Factor Screenings with Interventions:      Cognitive:  Clock Drawing Test (CDT) Score: Normal  Total Score Interpretation: Positive Mini-Cog  Did the patient refuse to take the cognition test?: No  Cognitive Impairment Interventions:  · Cognitive screening WNL       General Health and ACP:  General  In general, how would you say your health is?: Fair  In the past 7 days, have you experienced any of the following? New or Increased Pain, New or Increased Fatigue, Loneliness, Social Isolation, Stress or Anger?: None of These  Do you get the social and emotional support that you need?: Yes  Do you have a Living Will?: Yes  Advance Directives     Power of Javi Corey Will ACP-Advance Directive ACP-Power of     Not on File Not on File Not on File Not on File      General Health Risk Interventions:  · No Living Will: ACP documents already completed- patient asked to provide copy to the office    Health Habits/Nutrition:  Health Habits/Nutrition  Do you exercise for at least 20 minutes 2-3 times per week?: (!) No  Have you lost any weight without trying in the past 3 months?: No  Do you eat only one meal per day?: No  Have you seen the dentist within the past year?: (!) No  Body mass index: (!) 32.71  Health Habits/Nutrition Interventions:  · Inadequate physical activity:  Recommended walking for 30 minutes daily  · Dental exam overdue:  patient encouraged to make appointment with his/her dentist    Hearing/Vision:  No exam data present  Hearing/Vision  Do you or your family notice any trouble with your hearing that hasn't been managed with hearing aids?: (!) Yes (DR. Vinayak Whitaker)  Do you have difficulty driving, watching TV, or doing any of your daily activities because of your eyesight?: No  Have you had an eye exam within the past year?: Yes  Hearing/Vision Interventions:  · Hearing concerns:  patient declines any further evaluation/treatment for hearing issues    Safety:  Safety  Do you have working smoke detectors?: Yes  Have all throw rugs been removed or fastened?: (!) No  Do you have non-slip mats or surfaces in all bathtubs/showers?: Yes  Do all of your stairways have a railing or banister?: Yes  Are your doorways, halls and stairs free of clutter?: Yes  Do you always fasten your seatbelt when you are in a car?: Yes  Safety

## 2021-10-24 DIAGNOSIS — E11.29 MICROALBUMINURIA DUE TO TYPE 2 DIABETES MELLITUS (HCC): Primary | ICD-10-CM

## 2021-10-24 DIAGNOSIS — R80.9 MICROALBUMINURIA DUE TO TYPE 2 DIABETES MELLITUS (HCC): Primary | ICD-10-CM

## 2021-10-24 RX ORDER — CANAGLIFLOZIN 300 MG/1
300 TABLET, FILM COATED ORAL
Qty: 90 TABLET | Refills: 3 | Status: SHIPPED | OUTPATIENT
Start: 2021-10-24 | End: 2021-11-03 | Stop reason: CLARIF

## 2021-11-02 ENCOUNTER — OFFICE VISIT (OUTPATIENT)
Dept: INTERNAL MEDICINE CLINIC | Age: 71
End: 2021-11-02
Payer: MEDICARE

## 2021-11-02 VITALS
BODY MASS INDEX: 32.28 KG/M2 | DIASTOLIC BLOOD PRESSURE: 76 MMHG | HEART RATE: 64 BPM | SYSTOLIC BLOOD PRESSURE: 130 MMHG | OXYGEN SATURATION: 97 % | WEIGHT: 194 LBS

## 2021-11-02 DIAGNOSIS — E86.0 DEHYDRATION: ICD-10-CM

## 2021-11-02 DIAGNOSIS — R56.9 NEW ONSET SEIZURE (HCC): Primary | ICD-10-CM

## 2021-11-02 DIAGNOSIS — N30.00 ACUTE CYSTITIS WITHOUT HEMATURIA: ICD-10-CM

## 2021-11-02 DIAGNOSIS — I48.91 NEW ONSET ATRIAL FIBRILLATION (HCC): ICD-10-CM

## 2021-11-02 DIAGNOSIS — K52.9 GASTROENTERITIS: ICD-10-CM

## 2021-11-02 PROCEDURE — G8484 FLU IMMUNIZE NO ADMIN: HCPCS | Performed by: INTERNAL MEDICINE

## 2021-11-02 PROCEDURE — 4040F PNEUMOC VAC/ADMIN/RCVD: CPT | Performed by: INTERNAL MEDICINE

## 2021-11-02 PROCEDURE — 1111F DSCHRG MED/CURRENT MED MERGE: CPT | Performed by: INTERNAL MEDICINE

## 2021-11-02 PROCEDURE — G8400 PT W/DXA NO RESULTS DOC: HCPCS | Performed by: INTERNAL MEDICINE

## 2021-11-02 PROCEDURE — 1090F PRES/ABSN URINE INCON ASSESS: CPT | Performed by: INTERNAL MEDICINE

## 2021-11-02 PROCEDURE — 1123F ACP DISCUSS/DSCN MKR DOCD: CPT | Performed by: INTERNAL MEDICINE

## 2021-11-02 PROCEDURE — 99215 OFFICE O/P EST HI 40 MIN: CPT | Performed by: INTERNAL MEDICINE

## 2021-11-02 PROCEDURE — 1036F TOBACCO NON-USER: CPT | Performed by: INTERNAL MEDICINE

## 2021-11-02 PROCEDURE — G8417 CALC BMI ABV UP PARAM F/U: HCPCS | Performed by: INTERNAL MEDICINE

## 2021-11-02 PROCEDURE — 3017F COLORECTAL CA SCREEN DOC REV: CPT | Performed by: INTERNAL MEDICINE

## 2021-11-02 PROCEDURE — G8427 DOCREV CUR MEDS BY ELIG CLIN: HCPCS | Performed by: INTERNAL MEDICINE

## 2021-11-02 RX ORDER — LAMOTRIGINE 25 MG/1
TABLET ORAL
COMMUNITY
Start: 2021-10-29 | End: 2021-11-02 | Stop reason: SDUPTHER

## 2021-11-02 RX ORDER — LAMOTRIGINE 100 MG/1
100 TABLET ORAL 2 TIMES DAILY
Qty: 60 TABLET | Refills: 1 | Status: SHIPPED | OUTPATIENT
Start: 2021-11-02 | End: 2021-11-22 | Stop reason: SDUPTHER

## 2021-11-02 NOTE — PROGRESS NOTES
Post-Discharge Transitional Care Management Services or Hospital Follow Up      Orville Salvador   YOB: 1950    Date of Office Visit:  11/2/2021  Date of Hospital first  Admission: 10/21/21-10/23/21 [facility]  Date of Hospital second Admission: 10/24/21-10/29/21 [facility]    Care management risk score Rising risk (score 2-5) and Complex Care (Scores >=6): 2     Non face to face  following discharge, date last encounter closed (first attempt may have been earlier): *No documented post hospital discharge outreach found in the last 14 days     Call initiated 2 business days of discharge: *No response recorded in the last 14 days    Patient Active Problem List   Diagnosis    Anxiety and depression    DDD (degenerative disc disease), lumbar    Essential hypertension    Hyperlipidemia    Vitamin D deficiency    CANDELARIA (obstructive sleep apnea)    Chronic pain of both knees    Muscle pain    Type 2 diabetes mellitus without complication, without long-term current use of insulin (HCC)    Gastroesophageal reflux disease without esophagitis    Dyspnea on exertion    Left temporal headache    Other fatigue    Memory loss or impairment    Tension headache    Cervical radiculopathy    Acute neck pain    Vision disturbance    Bradycardia    History of COVID-19    Chronic pain disorder    Primary osteoarthritis of right knee    Bilateral carotid artery stenosis    New onset seizure ([address]    New onset atrial fibrillation ([address]    Dehydration    Acute cystitis without hematuria    Gastroenteritis       Allergies   Allergen Reactions    Compazine [Prochlorperazine Maleate] Shortness Of Breath     dyspnea    Morphine Nausea And Vomiting     N&V       Medications listed as ordered at the time of discharge from hospital  Reviewed.       Medications marked \"taking\" at this time  Outpatient Medications Marked as Taking for the 11/2/21 encounter (Office Visit) with Mere Phillips DO   Medication Sig Dispense Refill    lamoTRIgine (LAMICTAL) 100 MG tablet Take 1 tablet by mouth 2 times daily 60 tablet 1    [DISCONTINUED] canagliflozin (INVOKANA) 300 MG TABS tablet Take 1 tablet by mouth every morning (before breakfast) 90 tablet 3    blood glucose monitor kit and supplies Check sugars qd and prn 1 kit 0    blood glucose monitor strips Test 1 times a day & as needed for symptoms of irregular blood glucose. 100 strip 3    Lancets MISC 1 each by Does not apply route daily 100 each 5    metFORMIN (GLUCOPHAGE-XR) 500 MG extended release tablet TAKE 1 TABLET TWICE A DAY WITH MEALS 180 tablet 1    predniSONE (DELTASONE) 5 MG tablet Take 1 tablet by mouth daily 90 tablet 1    escitalopram (LEXAPRO) 20 MG tablet Take 1 tablet by mouth daily 90 tablet 3    rosuvastatin (CRESTOR) 20 MG tablet Take 1 tablet by mouth daily 90 tablet 3    omeprazole (PRILOSEC) 20 MG delayed release capsule Take 1 capsule by mouth Daily = 90 capsule 3    vitamin D (ERGOCALCIFEROL) 1.25 MG (66131 UT) CAPS capsule Take 1 capsule by mouth once a week 12 capsule 3    metoprolol succinate (TOPROL XL) 100 MG extended release tablet Take 1 tablet by mouth 2 times daily 180 tablet 3    telmisartan (MICARDIS) 80 MG tablet Take 1 tablet by mouth daily 90 tablet 3    niacin 500 MG extended release capsule Take 500 mg by mouth nightly      Magnesium Hydroxide (MAGNESIA PO) Take 50 mg by mouth daily       CPAP Machine MISC by Does not apply route          Medications patient taking as of now reconciled against medications ordered at time of hospital discharge: Yes    Chief Complaint   Patient presents with    Follow-Up from Hospital       HPI    Inpatient course: Discharge summary reviewed- see chart. Interval history/Current status:    Started with N/V/D around 10/18/21, did not improve over the next few days and went to  for admission.  During drive to ED,  noticed that she was struggling to breathing/gagging on phlegm- arms were waving and arms were flailing and she then got very rigid.  was pushing on her chest while driving. Did notice that she was gurgling while she was driving. Upon arrival to hospital she was unresponsive. No CPR/intubation. Was breathing on her own. Was told that her heart was \"out of whack\". Ad[facility] to [facility] 10/21-10/23/21. Reviewed discharge packet and had information on a.fib, UTI, strokes, Seizures. Started on [address]. Was also told that she has carotid atherosclerosis. Carotid Dopplers 10/22/21:    IMPRESSION:   Moderate plaque formation within the proximal left internal carotid artery   and bulb with an estimated 50-70% maximal stenosis.       Mild to moderate plaque formation within the proximal right internal carotid   artery and bulb with an estimated 30-49% maximal stenosis.  No flow   significant stenosis is suspected.       Plaque formation associated elevated peak systolic velocities within the   right external carotid artery indicate flow significant stenosis which may be   greater than 70%.       Bilateral antegrade flow the vertebral arteries.         CTA Chest 10/22/21:    IMPRESSION:   1. Limited study due to motion artifact. 2. No CT evidence of pulmonary embolism is appreciated. 3. Nonspecific 1 cm subpleural nodule in the right lower lobe.  Findings   could represent focal airspace disease but a small mass is not excluded. Follow-up imaging is recommended in 3 months. 4. Borderline cardiomegaly and mild vascular congestion are suspected. 5. Scattered areas of airspace disease are seen in the mid and lower lungs on   the right and in the lower lung on the left.  Findings could be related   atelectasis and/or infiltrates.  Follow-up imaging is recommended to document   clearing. 6. Small hiatal hernia.  At least mild esophageal wall thickening.  This off   a jet is or other underlying infectious or inflammatory abnormality is not   excluded.    7. Distended gallbladder.  A small amount of sludge is suspected along the   gallbladder.  No wall thickening or pericholecystic fluid is seen. Gus Athelstane   findings are noted above.         CT Head 10/22/21:    FINDINGS:   The 3rd and 4th ventricles are midline.  No acute hemorrhage or acute   extra-axial fluid collections are seen.  Scattered hypodensities are noted   throughout the periventricular and subcortical white matter which likely   represent mild-moderate chronic small vessel ischemic disease changes.  No   acute cortical based ischemic changes are appreciated.  No large mass or   midline shift is seen.  No fracture or other acute osseous abnormalities are   seen.  The visualized paranasal sinuses and mastoid air cells are clear.       IMPRESSION:   1. No acute hemorrhage or midline shift is seen. 2. Scattered hypodensities in the white matter likely represent   mild-to-moderate chronic small vessel ischemic disease changes.  No acute   cortical based ischemic changes are appreciated.  If you are clinically   concerned about acute ischemic changes or if more detailed imaging is   necessary, consider MRI for follow-up.                 Electronically Signed By: Refugio Boles DO   Electronically Signed On: 10/21/2021 at 11:23 am   Read By: Refugio Boles DO   Dictated:    10/21/2021 at 11:23 am   Transcribed:    Job Number:  [id number]   DB         Was post-ictal (tired, confused) from about 10:30 am to 3 pm.     10/24/21-10/29/21  On 10/24/21 was feeling very shaky and like she needed to be back in the hospital. In ambulance she had an episode where she stopped talking to them and just seemed to stare off into space. Had witnessed seizure in the ED around 9-9:30 pm. Was post-ictal (sleepy, confused) until about 3 am. On 10/25 there were no beds available so was transferred up to .  Did get an MRI on 10/29/21 at  and was told that her MRI looked normal. Changed from Keppra to Lamictal.     Currently on Lamictal 25 mg BID-> 50 mg BID after 2 weeks -> 100 mg BID. [facility] chart not available for review. Is feeling better. Still a little tired and weak abut is eating ok. Mentally is doing better. Does take gabapentin 300 mg HS. May have missed gabapentin when she was vomiting. Lexapro is not new. Vitals:    11/02/21 1545   BP: 130/76   Pulse: 64   SpO2: 97%   Weight: 194 lb (88 kg)     Body mass index is 32.28 kg/m². Wt Readings from Last 3 Encounters:   11/02/21 194 lb (88 kg)   10/18/21 196 lb 9.6 oz (89.2 kg)   07/23/21 195 lb 12.8 oz (88.8 kg)     BP Readings from Last 3 Encounters:   11/02/21 130/76   10/12/21 (!) 164/94   10/12/21 (!) 154/92       Review of Systems    Physical Exam          Assessment/Plan:    New onset seizure ([address]  We will have patient sign release of records to get copy of charts from  and [facility]. Currently on Lamictal 25 mg twice daily, titrating up to 100 mg twice daily. Referral placed to see neurology. Advised patient to avoid driving until cleared by neurology. New onset atrial fibrillation [facility])  Sounds like patient had A. fib in the ER at [facility]. Referral placed to cardiology. Obtain records from [facility]. Dehydration   Resolved with IV fluids. Acute cystitis without hematuria  Resolved. Gastroenteritis   Resolved. 1. New onset seizure ([address]    - lamoTRIgine (LAMICTAL) 100 MG tablet; Take 1 tablet by mouth 2 times daily  Dispense: 60 tablet; Refill: 1  - Jose Ashford MD, Neurology, [facility]  - [place] DISCHARGE MEDS RECONCILED W/ CURRENT OUTPATIENT MED LIST    2. New onset atrial fibrillation ([address]    - Maddie Ramirez MD, Cardiology, [facility] [address]/ CURRENT OUTPATIENT MED LIST    3. Dehydration    - [place] DISCHARGE MEDS RECONCILED W/ CURRENT OUTPATIENT MED LIST    4. Acute cystitis without hematuria    - [place] DISCHARGE MEDS RECONCILED W/ CURRENT OUTPATIENT MED LIST    5.  Gastroenteritis    - [place] DISCHARGE MEDS RECONCILED W/ CURRENT OUTPATIENT MED LIST        Medical Decision Making: high complexity    48 minutes spent in face to face encounter with patient.

## 2021-11-03 RX ORDER — EMPAGLIFLOZIN 25 MG/1
1 TABLET, FILM COATED ORAL DAILY
Qty: 90 TABLET | Refills: 3 | Status: SHIPPED | OUTPATIENT
Start: 2021-11-03 | End: 2021-11-15

## 2021-11-05 PROBLEM — I65.23 BILATERAL CAROTID ARTERY STENOSIS: Status: ACTIVE | Noted: 2021-11-05

## 2021-11-05 NOTE — PROGRESS NOTES
[address]      Cardiology Consult    Singularu  1950    November 15, 2021    Referring Physician: GEMINI Barakat  Reason for Referral: Possible atrial fib    CC: \" I have issues with my memory  \"    HPI:  The patient is 70 y.o. female with a past medical history significant for HTN, HLD, DM, CANDELARIA, Menière's dz, bradycardia, memory loss, GERD, chronic pain, anxiety and depression. Pt was evaluated at  [facility] 10/2021 for sore throat, nausea, vomiting, and diarrhea. Enroute to the ED, her  noticed that she was having difficulty breathing/gagging on phlegm, and her arms were waving and flailing and she then got very rigid.  was pushing on her chest while driving. Did notice that she was gurgling while she was driving. Upon arrival to hospital she was unresponsive. No CPR/intubation. Was breathing on her own. She was discharged on Keppra. On 10/24/21 was feeling very shaky and like she needed to be back in the hospital. In ambulance she had an episode where she stopped talking to them and just seemed to stare off into space. Had witnessed seizure in the ED. While in the ED she possibly had atrial fib. On 10/25 there were no beds available so was transferred up to . Did get an MRI on 10/29/21 at  and was told that her MRI looked normal. Changed from [address] to Lamictal. The patient does not recall the event. Today, her  accompanies her. The patient states that she 'still has issues with my memory. \" Her  states that occasionally for the past 6 months, she has had episodes of confusion. Pt reports occasional palpitations but denies sustained episoses of rapid heart beat. She has chronic, occasional ankle swelling which has recently improved. The patient denies exertional chest pain, dizziness, syncope, and worsening dyspnea. She was recently prescribed Jardiance.          Past Medical History:   Diagnosis Date    Back pain, chronic     Depression     Joint pain     Sleep apnea      Past Surgical History:   Procedure Laterality Date    LUMBAR FUSION  2016    PARTIAL HYSTERECTOMY  2002    SPINAL CORD STIMULATOR SURGERY      Transmit receive head coil only     Family History   Problem Relation Age of Onset    Heart Failure Mother     COPD Father      Social History     Tobacco Use    Smoking status: Never Smoker    Smokeless tobacco: Never Used   Substance Use Topics    Alcohol use: No    Drug use: No       Allergies   Allergen Reactions    Compazine [Prochlorperazine Maleate] Shortness Of Breath     dyspnea    Morphine Nausea And Vomiting     N&V     Current Outpatient Medications   Medication Sig Dispense Refill    gabapentin (NEURONTIN) 300 MG capsule Take 300 mg by mouth nightly.  aspirin EC 81 MG EC tablet Take 81 mg by mouth daily      lamoTRIgine (LAMICTAL) 100 MG tablet Take 1 tablet by mouth 2 times daily 60 tablet 1    blood glucose monitor kit and supplies Check sugars qd and prn 1 kit 0    blood glucose monitor strips Test 1 times a day & as needed for symptoms of irregular blood glucose.  100 strip 3    Lancets MISC 1 each by Does not apply route daily 100 each 5    metFORMIN (GLUCOPHAGE-XR) 500 MG extended release tablet TAKE 1 TABLET TWICE A DAY WITH MEALS 180 tablet 1    predniSONE (DELTASONE) 5 MG tablet Take 1 tablet by mouth daily 90 tablet 1    escitalopram (LEXAPRO) 20 MG tablet Take 1 tablet by mouth daily 90 tablet 3    rosuvastatin (CRESTOR) 20 MG tablet Take 1 tablet by mouth daily 90 tablet 3    omeprazole (PRILOSEC) 20 MG delayed release capsule Take 1 capsule by mouth Daily = 90 capsule 3    vitamin D (ERGOCALCIFEROL) 1.25 MG (81971 UT) CAPS capsule Take 1 capsule by mouth once a week 12 capsule 3    metoprolol succinate (TOPROL XL) 100 MG extended release tablet Take 1 tablet by mouth 2 times daily 180 tablet 3    telmisartan (MICARDIS) 80 MG tablet Take 1 tablet by mouth daily 90 tablet 3    niacin 500 MG extended release capsule Take 500 mg by mouth nightly      Magnesium Hydroxide (MAGNESIA PO) Take 50 mg by mouth daily       CPAP Machine MISC by Does not apply route      gabapentin (NEURONTIN) 300 MG capsule Take 1 capsule by mouth nightly for 90 days. 90 capsule 3     No current facility-administered medications for this visit. Review of Systems:  · Constitutional: no unanticipated weight loss. There's been no change in energy level, sleep pattern, or activity level. No fevers, chills. · Eyes: No visual changes or diplopia. No scleral icterus. · ENT: No Headaches, hearing loss or vertigo. No mouth sores or sore throat. · Cardiovascular: as reviewed in HPI  · Respiratory: No cough or wheezing, no sputum production. No hematemesis. · Gastrointestinal: No abdominal pain, appetite loss, blood in stools. No change in bowel or bladder habits. · Genitourinary: No dysuria, trouble voiding, or hematuria. · Musculoskeletal:  No gait disturbance, no joint complaints. · Integumentary: No rash or pruritis. · Neurological: No headache, diplopia, change in muscle strength, numbness or tingling. · Psychiatric: No anxiety or depression. · Endocrine: No temperature intolerance. No excessive thirst, fluid intake, or urination. No tremor. · Hematologic/Lymphatic: No abnormal bruising or bleeding, blood clots or swollen lymph nodes. · Allergic/Immunologic: No nasal congestion or hives. Physical Exam:   /82   Pulse 50   Ht 5' 6\" (1.676 m)   Wt 193 lb (87.5 kg)   SpO2 98%   BMI 31.15 kg/m²   Wt Readings from Last 3 Encounters:   11/15/21 193 lb (87.5 kg)   11/02/21 194 lb (88 kg)   10/18/21 196 lb 9.6 oz (89.2 kg)     Constitutional: She is oriented to person, place, and time. She appears well-developed and well-nourished. In no acute distress. Head: Normocephalic and atraumatic. Pupils equal and round. Neck: Neck supple. No JVP or carotid bruit appreciated. No mass and no thyromegaly present.  No lymphadenopathy present. Cardiovascular: Normal rate. Normal heart sounds. Exam reveals no gallop and no friction rub. No murmur heard. Pulmonary/Chest: Effort normal and breath sounds normal. No respiratory distress. She has no wheezes, rhonchi or rales. Abdominal: Soft, non-tender. Bowel sounds are normal. She exhibits no organomegaly, mass or bruit. Extremities: No edema, cyanosis, or clubbing. Pulses are 2+ radial/dorsalis pedis/posterior tibial/carotid bilaterally. Neurological: No gross cranial nerve deficit. Coordination normal.   Skin: Skin is warm and dry. There is no rash or diaphoresis. Psychiatric: She has a normal mood and affect. Her speech is normal and behavior is normal.     Lab Review:   FLP:    Lab Results   Component Value Date    TRIG 130 10/12/2021    HDL 69 10/12/2021    LDLCALC 69 10/12/2021    LDLDIRECT 165 02/14/2019    LABVLDL 26 10/12/2021     BUN/Creatinine:    Lab Results   Component Value Date    BUN 11 10/23/2021    CREATININE 0.79 10/23/2021       EKG Interpretation:   EKG 7/2019 SB  EKG 2/2021 SB  EKG 11/15/21 SB    Image Review:   Carotid Dopplers 10/22/21:   Moderate plaque formation within the proximal left internal carotid artery   and bulb with an estimated 50-70% maximal stenosis.       Mild to moderate plaque formation within the proximal right internal carotid   artery and bulb with an estimated 30-49% maximal stenosis.  No flow   significant stenosis is suspected.       Plaque formation associated elevated peak systolic velocities within the   right external carotid artery indicate flow significant stenosis which may be   greater than 70%.       Bilateral antegrade flow the vertebral arteries. CTA Chest 10/22/21:  1. Limited study due to motion artifact. 2. No CT evidence of pulmonary embolism is appreciated. 3. Nonspecific 1 cm subpleural nodule in the right lower lobe.  Findings   could represent focal airspace disease but a small mass is not excluded. Follow-up imaging is recommended in 3 months. 4. Borderline cardiomegaly and mild vascular congestion are suspected. 5. Scattered areas of airspace disease are seen in the mid and lower lungs on   the right and in the lower lung on the left.  Findings could be related   atelectasis and/or infiltrates.  Follow-up imaging is recommended to document   clearing. 6. Small hiatal hernia.  At least mild esophageal wall thickening.  This off   a jet is or other underlying infectious or inflammatory abnormality is not   excluded. 7. Distended gallbladder.  A small amount of sludge is suspected along the   gallbladder.  No wall thickening or pericholecystic fluid is seen. Dayday Sandi   findings are noted above.          CT Head 10/22/21:  The 3rd and 4th ventricles are midline.  No acute hemorrhage or acute   extra-axial fluid collections are seen.  Scattered hypodensities are noted   throughout the periventricular and subcortical white matter which likely   represent mild-moderate chronic small vessel ischemic disease changes.  No   acute cortical based ischemic changes are appreciated.  No large mass or   midline shift is seen.  No fracture or other acute osseous abnormalities are   seen.  The visualized paranasal sinuses and mastoid air cells are clear.       IMPRESSION:   1. No acute hemorrhage or midline shift is seen. 2. Scattered hypodensities in the white matter likely represent   mild-to-moderate chronic small vessel ischemic disease changes.  No acute   cortical based ischemic changes are appreciated.  If you are clinically   concerned about acute ischemic changes or if more detailed imaging is   necessary, consider MRI for follow-up.             Assessment/Plan:      Syncope/Possible atrial fib   Patient is being seen here as a new patient.   She went to [facility] with possible syncope and seizures.  -While she was in the emergency room, EKG per patient showed atrial fibrillation  -She denied having any symptoms of palpitation chest pain or shortness of breath  -Her examination today shows regular rate and rhythm and EKG shows sinus rhythm with no evidence of cardiac arrhythmias  Will request EKG's from [facility] for review  Recommend echo to exclude structural and/or functional abnormalities. Will also arrange event monitor to exclude arrhythmia. Newly diagnosed seizure disorder  Evaluated by neurology    Carotid artery stenosis. Continue ASA and crestor. Essential hypertension. Controlled. Goal BP <130/80. Continue medical therapy. HLD. LDL 69 10/2021  -Taking crestor 20mg daily   -LDL goal is less than 70    F/u in office in 6-8 weeks. Complexity of medical decision making-high    Thank you very much for allowing me to participate in the care of your patient. Please do not hesitate to contact me if you have any questions. Sincerely,  Blanquita Flores MD      [facility], [address] [place] [address]  Ph: [phone]  Fax: [phone]    This note was scribed in the presence of the physician by Del Moore RN.

## 2021-11-07 PROBLEM — R56.9 NEW ONSET SEIZURE (HCC): Status: ACTIVE | Noted: 2021-11-07

## 2021-11-07 PROBLEM — N30.00 ACUTE CYSTITIS WITHOUT HEMATURIA: Status: ACTIVE | Noted: 2021-11-07

## 2021-11-07 PROBLEM — I48.91 NEW ONSET ATRIAL FIBRILLATION (HCC): Status: ACTIVE | Noted: 2021-11-07

## 2021-11-07 PROBLEM — K52.9 GASTROENTERITIS: Status: ACTIVE | Noted: 2021-11-07

## 2021-11-07 PROBLEM — E86.0 DEHYDRATION: Status: ACTIVE | Noted: 2021-11-07

## 2021-11-08 NOTE — ASSESSMENT & PLAN NOTE
We will have patient sign release of records to get copy of charts from [facility]. Currently on Lamictal 25 mg twice daily, titrating up to 100 mg twice daily. Referral placed to see neurology. Advised patient to avoid driving until cleared by neurology.

## 2021-11-08 NOTE — ASSESSMENT & PLAN NOTE
Sounds like patient had A. fib in the ER at [facility]. Referral placed to cardiology. Obtain records from [facility].

## 2021-11-15 ENCOUNTER — OFFICE VISIT (OUTPATIENT)
Dept: CARDIOLOGY CLINIC | Age: 71
End: 2021-11-15
Payer: MEDICARE

## 2021-11-15 VITALS
HEART RATE: 50 BPM | WEIGHT: 193 LBS | DIASTOLIC BLOOD PRESSURE: 82 MMHG | SYSTOLIC BLOOD PRESSURE: 132 MMHG | BODY MASS INDEX: 31.02 KG/M2 | OXYGEN SATURATION: 98 % | HEIGHT: 66 IN

## 2021-11-15 DIAGNOSIS — I10 ESSENTIAL HYPERTENSION: ICD-10-CM

## 2021-11-15 DIAGNOSIS — R00.1 BRADYCARDIA: Primary | ICD-10-CM

## 2021-11-15 DIAGNOSIS — R55 SYNCOPE AND COLLAPSE: ICD-10-CM

## 2021-11-15 DIAGNOSIS — I48.91 ATRIAL FIBRILLATION, UNSPECIFIED TYPE (HCC): ICD-10-CM

## 2021-11-15 DIAGNOSIS — E78.5 HYPERLIPIDEMIA, UNSPECIFIED HYPERLIPIDEMIA TYPE: ICD-10-CM

## 2021-11-15 DIAGNOSIS — I65.23 BILATERAL CAROTID ARTERY STENOSIS: ICD-10-CM

## 2021-11-15 PROCEDURE — G8417 CALC BMI ABV UP PARAM F/U: HCPCS | Performed by: INTERNAL MEDICINE

## 2021-11-15 PROCEDURE — 3017F COLORECTAL CA SCREEN DOC REV: CPT | Performed by: INTERNAL MEDICINE

## 2021-11-15 PROCEDURE — 93000 ELECTROCARDIOGRAM COMPLETE: CPT | Performed by: INTERNAL MEDICINE

## 2021-11-15 PROCEDURE — G8428 CUR MEDS NOT DOCUMENT: HCPCS | Performed by: INTERNAL MEDICINE

## 2021-11-15 PROCEDURE — 4040F PNEUMOC VAC/ADMIN/RCVD: CPT | Performed by: INTERNAL MEDICINE

## 2021-11-15 PROCEDURE — 1090F PRES/ABSN URINE INCON ASSESS: CPT | Performed by: INTERNAL MEDICINE

## 2021-11-15 PROCEDURE — G8400 PT W/DXA NO RESULTS DOC: HCPCS | Performed by: INTERNAL MEDICINE

## 2021-11-15 PROCEDURE — 1036F TOBACCO NON-USER: CPT | Performed by: INTERNAL MEDICINE

## 2021-11-15 PROCEDURE — 1123F ACP DISCUSS/DSCN MKR DOCD: CPT | Performed by: INTERNAL MEDICINE

## 2021-11-15 PROCEDURE — E0616 CARDIAC EVENT RECORDER: HCPCS | Performed by: INTERNAL MEDICINE

## 2021-11-15 PROCEDURE — G8484 FLU IMMUNIZE NO ADMIN: HCPCS | Performed by: INTERNAL MEDICINE

## 2021-11-15 PROCEDURE — 99204 OFFICE O/P NEW MOD 45 MIN: CPT | Performed by: INTERNAL MEDICINE

## 2021-11-15 RX ORDER — GABAPENTIN 300 MG/1
300 CAPSULE ORAL NIGHTLY
COMMUNITY
End: 2022-06-24

## 2021-11-15 RX ORDER — ASPIRIN 81 MG/1
81 TABLET ORAL DAILY
COMMUNITY
End: 2022-11-04

## 2021-11-15 NOTE — PATIENT INSTRUCTIONS
Patient Education        A Healthy Heart: Care Instructions  Your Care Instructions     Coronary artery disease, also called heart disease, occurs when a substance called plaque builds up in the vessels that supply oxygen-rich blood to your heart muscle. This can narrow the blood vessels and reduce blood flow. A heart attack happens when blood flow is completely blocked. A high-fat diet, smoking, and other factors increase the risk of heart disease. Your doctor has found that you have a chance of having heart disease. You can do lots of things to keep your heart healthy. It may not be easy, but you can change your diet, exercise more, and quit smoking. These steps really work to lower your chance of heart disease. Follow-up care is a key part of your treatment and safety. Be sure to make and go to all appointments, and call your doctor if you are having problems. It's also a good idea to know your test results and keep a list of the medicines you take. How can you care for yourself at home? Diet    · Use less salt when you cook and eat. This helps lower your blood pressure. Taste food before salting. Add only a little salt when you think you need it. With time, your taste buds will adjust to less salt.     · Eat fewer snack items, fast foods, canned soups, and other high-salt, high-fat, processed foods.     · Read food labels and try to avoid saturated and trans fats. They increase your risk of heart disease by raising cholesterol levels.     · Limit the amount of solid fat-butter, margarine, and shortening-you eat. Use olive, peanut, or canola oil when you cook. Bake, broil, and steam foods instead of frying them.     · Eat a variety of fruit and vegetables every day. Dark green, deep orange, red, or yellow fruits and vegetables are especially good for you.  Examples include spinach, carrots, peaches, and berries.     · Foods high in fiber can reduce your cholesterol and provide important vitamins and minerals. High-fiber foods include whole-grain cereals and breads, oatmeal, beans, brown rice, citrus fruits, and apples.     · Eat lean proteins. Heart-healthy proteins include seafood, lean meats and poultry, eggs, beans, peas, nuts, seeds, and soy products.     · Limit drinks and foods with added sugar. These include candy, desserts, and soda pop. Lifestyle changes    · If your doctor recommends it, get more exercise. Walking is a good choice. Bit by bit, increase the amount you walk every day. Try for at least 30 minutes on most days of the week. You also may want to swim, bike, or do other activities.     · Do not smoke. If you need help quitting, talk to your doctor about stop-smoking programs and medicines. These can increase your chances of quitting for good. Quitting smoking may be the most important step you can take to protect your heart. It is never too late to quit.     · Limit alcohol to 2 drinks a day for men and 1 drink a day for women. Too much alcohol can cause health problems.     · Manage other health problems such as diabetes, high blood pressure, and high cholesterol. If you think you may have a problem with alcohol or drug use, talk to your doctor. Medicines    · Take your medicines exactly as prescribed. Call your doctor if you think you are having a problem with your medicine.     · If your doctor recommends aspirin, take the amount directed each day. Make sure you take aspirin and not another kind of pain reliever, such as acetaminophen (Tylenol). When should you call for help? Call 911 if you have symptoms of a heart attack. These may include:    · Chest pain or pressure, or a strange feeling in the chest.     · Sweating.     · Shortness of breath.     · Pain, pressure, or a strange feeling in the back, neck, jaw, or upper belly or in one or both shoulders or arms.     · Lightheadedness or sudden weakness.     · A fast or irregular heartbeat.    After you call 911, the  may tell you to chew 1 adult-strength or 2 to 4 low-dose aspirin. Wait for an ambulance. Do not try to drive yourself. Watch closely for changes in your health, and be sure to contact your doctor if you have any problems. Where can you learn more? Go to [email or web address]://[email or web address] org and sign in to your [employer] account. Enter T393 in the [employer] box to learn more about \"A Healthy Heart: Care Instructions. \"     If you do not have an account, please click on the \"Sign Up Now\" link. Current as of: April 29, 2021               Content Version: 13.0  © 6742-2453 [employer], Incorporated. Care instructions adapted under license by [facility] ([facility]). If you have questions about a medical condition or this instruction, always ask your healthcare professional. [address] any warranty or liability for your use of this information.

## 2021-11-16 ENCOUNTER — TELEPHONE (OUTPATIENT)
Dept: CARDIOLOGY CLINIC | Age: 71
End: 2021-11-16

## 2021-11-16 ENCOUNTER — PROCEDURE VISIT (OUTPATIENT)
Dept: CARDIOLOGY CLINIC | Age: 71
End: 2021-11-16
Payer: MEDICARE

## 2021-11-16 DIAGNOSIS — R55 SYNCOPE AND COLLAPSE: Primary | ICD-10-CM

## 2021-11-16 LAB
LV EF: 55 %
LVEF MODALITY: NORMAL

## 2021-11-16 PROCEDURE — 93306 TTE W/DOPPLER COMPLETE: CPT | Performed by: INTERNAL MEDICINE

## 2021-11-22 ENCOUNTER — TELEPHONE (OUTPATIENT)
Dept: INTERNAL MEDICINE CLINIC | Age: 71
End: 2021-11-22

## 2021-11-22 DIAGNOSIS — R56.9 NEW ONSET SEIZURE (HCC): ICD-10-CM

## 2021-11-22 DIAGNOSIS — R91.1 PULMONARY NODULE: Primary | ICD-10-CM

## 2021-11-22 RX ORDER — LAMOTRIGINE 100 MG/1
100 TABLET ORAL 2 TIMES DAILY
Qty: 180 TABLET | Refills: 0 | Status: SHIPPED | OUTPATIENT
Start: 2021-11-22 | End: 2022-02-02

## 2021-11-22 NOTE — TELEPHONE ENCOUNTER
Pt called stating that she was in the hospital for seizures. They found a spot on her lung in her scans. She is wondering if there is anything additional she should do to follow up. She was placed on medication from the hospital for her seizures. She is needing a script sent to [facility] for:     Lamotrigine 100 mg  1 BID        She has a meeting at 6 pm today.  Ok to leave a message on vm

## 2021-11-22 NOTE — TELEPHONE ENCOUNTER
Yes, I saw that about the nodule and have a note in her chart that she needs a repeat CT in January 2022. I have ordered it. Have done in late-January. I recommend that she have the repeat CT done at [facility] since that is where she had the original CT done and they will be able to compare results. I did only send in a 90 day supply to [facility] wants to make changes to her medicine.  saw

## 2021-12-02 ENCOUNTER — HOSPITAL ENCOUNTER (OUTPATIENT)
Dept: CT IMAGING | Age: 71
Discharge: HOME | End: 2021-12-02
Payer: MEDICARE

## 2021-12-02 DIAGNOSIS — R91.1 PULMONARY NODULE: ICD-10-CM

## 2021-12-02 PROCEDURE — 71250 CT THORAX DX C-: CPT

## 2021-12-07 PROBLEM — E86.0 DEHYDRATION: Status: RESOLVED | Noted: 2021-11-07 | Resolved: 2021-12-07

## 2021-12-14 ENCOUNTER — TELEPHONE (OUTPATIENT)
Dept: INTERNAL MEDICINE CLINIC | Age: 71
End: 2021-12-14

## 2021-12-14 NOTE — TELEPHONE ENCOUNTER
----- Message from [address] sent at 12/14/2021  2:45 PM EST -----  Subject: Message to Provider    QUESTIONS  Information for Provider? PT called follow up on conversation with [facility]   about results. ---------------------------------------------------------------------------  --------------  Warden Ana CHRISTINE  What is the best way for the office to contact you? OK to leave message on   voicemail  Preferred Call Back Phone Number? [phone]  ---------------------------------------------------------------------------  --------------  SCRIPT ANSWERS  Relationship to Patient?  Self

## 2021-12-14 NOTE — TELEPHONE ENCOUNTER
A copy of her CT done at  needs to be provided to the radiologist at [facility] to compare.

## 2021-12-15 PROBLEM — R55 SYNCOPE AND COLLAPSE: Status: ACTIVE | Noted: 2021-12-15

## 2021-12-15 PROBLEM — I34.0 NONRHEUMATIC MITRAL VALVE REGURGITATION: Status: ACTIVE | Noted: 2021-12-15

## 2021-12-15 NOTE — TELEPHONE ENCOUNTER
Left message for MM to return my call regarding getting films over to [facility]. Pt is aware.

## 2021-12-28 ENCOUNTER — TELEPHONE (OUTPATIENT)
Dept: INTERNAL MEDICINE CLINIC | Age: 71
End: 2021-12-28

## 2021-12-28 RX ORDER — PREDNISONE 1 MG/1
TABLET ORAL
Qty: 90 TABLET | Refills: 3 | Status: SHIPPED | OUTPATIENT
Start: 2021-12-28

## 2021-12-28 NOTE — TELEPHONE ENCOUNTER
Called patient. She wanted to know if DR. Shoaib Street got the CT Sacn from [facility] to compare with latest CT of Chest. She was also concerned about nodule on her lung. Is aware that Dr. Shoaib Street is out of the office this week. Ok with call on Monday.

## 2021-12-28 NOTE — TELEPHONE ENCOUNTER
----- Message from Yareli Daren sent at 12/27/2021  2:54 PM EST -----  Subject: Results Request    QUESTIONS  Which lab or imaging result is the patient calling about? CT Scan & MRI  Which provider ordered the test?   At what location was the test performed? Date the test was performed? Additional Information for Provider? Pt called in and is wondering if her   results are back yet. She said it has been about 3 weeks and hasn't heard   anything. Could you please follow up with her?   ---------------------------------------------------------------------------  --------------  CALL BACK INFO  What is the best way for the office to contact you? OK to leave message on   voicemail  Preferred Call Back Phone Number?  [phone]

## 2021-12-29 NOTE — TELEPHONE ENCOUNTER
We have been trying to get radiology to compare CT scans. I am not sure what else needs to be done.  saw

## 2022-01-03 ENCOUNTER — TELEPHONE (OUTPATIENT)
Dept: CARDIOLOGY CLINIC | Age: 72
End: 2022-01-03

## 2022-01-03 NOTE — TELEPHONE ENCOUNTER
Pt is calling in wanting her 30 day event monitor results. [facility] can be reached at [phone].

## 2022-01-04 NOTE — PROGRESS NOTES
[address]      Cardiology Consult    Lesli Hernandez  1950    January 18, 2022    Referring Physician: S. [facility]  Reason for Referral: Possible atrial fib    CC: \"feeling shaky, issues with memory   \"    HPI:  The patient is 70 y.o. female with a past medical history significant for HTN, HLD, DM, CANDELARIA, Menière's dz, bradycardia, memory loss, GERD, chronic pain, anxiety and depression. Pt was evaluated at  [facility] 10/2021 for sore throat, nausea, vomiting, and diarrhea. Enroute to the ED, her  noticed that she was having difficulty breathing/gagging on phlegm, and her arms were waving and flailing and she then got very rigid.  was pushing on her chest while driving. Did notice that she was gurgling while she was driving. Upon arrival to hospital she was unresponsive. No CPR/intubation. Was breathing on her own. She was discharged on Keppra. On 10/24/21 was feeling very shaky and like she needed to be back in the hospital. In ambulance she had an episode where she stopped talking to them and just seemed to stare off into space. Had witnessed seizure in the ED. While in the ED she possibly had atrial fib. On 10/25 there were no beds available so was transferred up to . Did get an MRI on 10/29/21 at  and was told that her MRI looked normal. Changed from [address] to Lamictal. The patient does not recall the event. Pt reports memory loss and her  reports episodes of confusion. Pt reports occasional palpitations but denies sustained episoses of rapid heart beat. She has chronic, occasional ankle swelling which has recently improved. Today, she is here for f/u of her event monitor and echo. She denies recent seizure activity, near syncope and syncope. She states that she is having issues with her memory and feeling \"shaky. \" She is also having back pain. She continues to follow with neurology.  The patient denies exertional chest pain, dizziness, worsening ankle swelling, PND and worsening dyspnea. She reports compliance with her medications. She has not received her COVID vaccination. Past Medical History:   Diagnosis Date    Back pain, chronic     Depression     Joint pain     Sleep apnea      Past Surgical History:   Procedure Laterality Date    LUMBAR FUSION  2016    PARTIAL HYSTERECTOMY  2002    SPINAL CORD STIMULATOR SURGERY      Transmit receive head coil only     Family History   Problem Relation Age of Onset    Heart Failure Mother     COPD Father      Social History     Tobacco Use    Smoking status: Never Smoker    Smokeless tobacco: Never Used   Vaping Use    Vaping Use: Never used   Substance Use Topics    Alcohol use: No    Drug use: No       Allergies   Allergen Reactions    Compazine [Prochlorperazine Maleate] Shortness Of Breath     dyspnea    Morphine Nausea And Vomiting     N&V     Current Outpatient Medications   Medication Sig Dispense Refill    predniSONE (DELTASONE) 5 MG tablet TAKE 1 TABLET DAILY 90 tablet 3    lamoTRIgine (LAMICTAL) 100 MG tablet Take 1 tablet by mouth 2 times daily 180 tablet 0    gabapentin (NEURONTIN) 300 MG capsule Take 300 mg by mouth nightly.  aspirin EC 81 MG EC tablet Take 81 mg by mouth daily      blood glucose monitor kit and supplies Check sugars qd and prn 1 kit 0    blood glucose monitor strips Test 1 times a day & as needed for symptoms of irregular blood glucose.  100 strip 3    Lancets MISC 1 each by Does not apply route daily 100 each 5    metFORMIN (GLUCOPHAGE-XR) 500 MG extended release tablet TAKE 1 TABLET TWICE A DAY WITH MEALS 180 tablet 1    escitalopram (LEXAPRO) 20 MG tablet Take 1 tablet by mouth daily 90 tablet 3    rosuvastatin (CRESTOR) 20 MG tablet Take 1 tablet by mouth daily 90 tablet 3    omeprazole (PRILOSEC) 20 MG delayed release capsule Take 1 capsule by mouth Daily = 90 capsule 3    vitamin D (ERGOCALCIFEROL) 1.25 MG (01816 UT) CAPS capsule Take 1 capsule by mouth once a week 12 capsule 3    metoprolol succinate (TOPROL XL) 100 MG extended release tablet Take 1 tablet by mouth 2 times daily 180 tablet 3    telmisartan (MICARDIS) 80 MG tablet Take 1 tablet by mouth daily 90 tablet 3    niacin 500 MG extended release capsule Take 500 mg by mouth nightly      Magnesium Hydroxide (MAGNESIA PO) Take 50 mg by mouth daily       CPAP Machine MISC by Does not apply route       No current facility-administered medications for this visit. Review of Systems:  · Constitutional: no unanticipated weight loss. There's been no change in energy level, sleep pattern, or activity level. No fevers, chills. · Eyes: No visual changes or diplopia. No scleral icterus. · ENT: No Headaches, hearing loss or vertigo. No mouth sores or sore throat. · Cardiovascular: as reviewed in HPI  · Respiratory: No cough or wheezing, no sputum production. No hematemesis. · Gastrointestinal: No abdominal pain, appetite loss, blood in stools. No change in bowel or bladder habits. · Genitourinary: No dysuria, trouble voiding, or hematuria. · Musculoskeletal:  No gait disturbance, no joint complaints. · Integumentary: No rash or pruritis. · Neurological: No headache, diplopia, change in muscle strength, numbness or tingling. · Psychiatric: No anxiety or depression. · Endocrine: No temperature intolerance. No excessive thirst, fluid intake, or urination. No tremor. · Hematologic/Lymphatic: No abnormal bruising or bleeding, blood clots or swollen lymph nodes. · Allergic/Immunologic: No nasal congestion or hives. Physical Exam:   /82   Pulse 56   Ht 5' 6\" (1.676 m)   Wt 192 lb (87.1 kg)   SpO2 96%   BMI 30.99 kg/m²   Wt Readings from Last 3 Encounters:   01/18/22 192 lb (87.1 kg)   01/05/22 193 lb (87.5 kg)   11/15/21 193 lb (87.5 kg)     Constitutional: She is oriented to person, place, and time. She appears well-developed and well-nourished. In no acute distress.    Head: Normocephalic and atraumatic. Pupils equal and round. Neck: Neck supple. No JVP or carotid bruit appreciated. No mass and no thyromegaly present. No lymphadenopathy present. Cardiovascular: Normal rate. Normal heart sounds. Exam reveals no gallop and no friction rub. No murmur heard. Pulmonary/Chest: Effort normal and breath sounds normal. No respiratory distress. She has no wheezes, rhonchi or rales. Abdominal: Soft, non-tender. Bowel sounds are normal. She exhibits no organomegaly, mass or bruit. Extremities: No edema, cyanosis, or clubbing. Pulses are 2+ radial/dorsalis pedis/posterior tibial/carotid bilaterally. Neurological: No gross cranial nerve deficit. Coordination normal.   Skin: Skin is warm and dry. There is no rash or diaphoresis. Psychiatric: She has a normal mood and affect. Her speech is normal and behavior is normal.     Lab Review:   FLP:    Lab Results   Component Value Date    TRIG 130 10/12/2021    HDL 69 10/12/2021    LDLCALC 69 10/12/2021    LDLDIRECT 165 02/14/2019    LABVLDL 26 10/12/2021     BUN/Creatinine:    Lab Results   Component Value Date    BUN 11 10/23/2021    CREATININE 0.79 10/23/2021       EKG Interpretation:   EKG 7/2019 SB  EKG 2/2021 SB  EKG 11/15/21 SB  EKG's and rhythm strips from [facility] 10/2021 reviewed    Event monitor 12/2021 NSR, SB, 1% PVC/PAC burden. Image Review:   Carotid Dopplers 10/22/21:   Moderate plaque formation within the proximal left internal carotid artery   and bulb with an estimated 50-70% maximal stenosis.       Mild to moderate plaque formation within the proximal right internal carotid   artery and bulb with an estimated 30-49% maximal stenosis.  No flow   significant stenosis is suspected.       Plaque formation associated elevated peak systolic velocities within the   right external carotid artery indicate flow significant stenosis which may be   greater than 70%.       Bilateral antegrade flow the vertebral arteries. CTA Chest 10/22/21:  1.  Limited study due to motion artifact. 2. No CT evidence of pulmonary embolism is appreciated. 3. Nonspecific 1 cm subpleural nodule in the right lower lobe.  Findings   could represent focal airspace disease but a small mass is not excluded. Follow-up imaging is recommended in 3 months. 4. Borderline cardiomegaly and mild vascular congestion are suspected. 5. Scattered areas of airspace disease are seen in the mid and lower lungs on   the right and in the lower lung on the left.  Findings could be related   atelectasis and/or infiltrates.  Follow-up imaging is recommended to document   clearing. 6. Small hiatal hernia.  At least mild esophageal wall thickening.  This off   a jet is or other underlying infectious or inflammatory abnormality is not   excluded. 7. Distended gallbladder.  A small amount of sludge is suspected along the   gallbladder.  No wall thickening or pericholecystic fluid is seen. Amaury Parrish   findings are noted above.          CT Head 10/22/21:  The 3rd and 4th ventricles are midline.  No acute hemorrhage or acute   extra-axial fluid collections are seen.  Scattered hypodensities are noted   throughout the periventricular and subcortical white matter which likely   represent mild-moderate chronic small vessel ischemic disease changes.  No   acute cortical based ischemic changes are appreciated.  No large mass or   midline shift is seen.  No fracture or other acute osseous abnormalities are   seen.  The visualized paranasal sinuses and mastoid air cells are clear.       IMPRESSION:   1. No acute hemorrhage or midline shift is seen. 2. Scattered hypodensities in the white matter likely represent   mild-to-moderate chronic small vessel ischemic disease changes.  No acute   cortical based ischemic changes are appreciated.  If you are clinically   concerned about acute ischemic changes or if more detailed imaging is   necessary, consider MRI for follow-up. Echo 11/2021    Mild conc.  LVH with normal LV size & wall motion. EF is    55%. Grade II   diastolic function. Moderate mitral regurgitation. The left atrium is moderately dilated. The right ventricle is normal in size and function. Mild-moderate tricuspid regurgitation. Assessment/Plan:      Syncope/Possible atrial fib   -She went to [facility] with possible syncope and seizures.  -While she was in the emergency room, EKG per patient showed atrial fibrillation  -She denied having any symptoms of palpitation chest pain or shortness of breath  -Her examination shows regular rate and rhythm and EKG shows sinus rhythm with no evidence of cardiac arrhythmias  -Echo showed normal LVEF, grade II DD, mod MR, mild-mod TR.  -I personally reviewed her EKG and event monitor from either my hospital which showed sinus rhythm with frequent PACs and no atrial fibrillation.  -She will therefore will not require any further testing or anticoagulation at this time  -     Newly diagnosed seizure disorder  Evaluated by neurology    Carotid artery stenosis. Continue ASA and crestor. Essential hypertension. Controlled. Goal BP <130/80. Continue medical therapy. HLD. LDL 69 10/2021  -Taking crestor 20mg daily   -LDL goal is less than 70    Moderate MR.   -Asymptomatic, will continue to monitor. F/u in office in 1 year  Encouraged to get yearly flu and COVID vaccinations. Thank you very much for allowing me to participate in the care of your patient. Please do not hesitate to contact me if you have any questions. Sincerely,  Allie Salomon MD      [facility], [address] [place] [address]  Ph: [phone]  Fax: [phone]    This note was scribed in the presence of the physician by Harjinder Cooney RN.

## 2022-01-05 ENCOUNTER — OFFICE VISIT (OUTPATIENT)
Dept: NEUROLOGY | Age: 72
End: 2022-01-05
Payer: MEDICARE

## 2022-01-05 VITALS
SYSTOLIC BLOOD PRESSURE: 199 MMHG | BODY MASS INDEX: 31.02 KG/M2 | HEART RATE: 49 BPM | WEIGHT: 193 LBS | HEIGHT: 66 IN | DIASTOLIC BLOOD PRESSURE: 94 MMHG

## 2022-01-05 DIAGNOSIS — R90.82 WHITE MATTER ABNORMALITY ON MRI OF BRAIN: ICD-10-CM

## 2022-01-05 DIAGNOSIS — G40.209 PARTIAL SYMPTOMATIC EPILEPSY WITH COMPLEX PARTIAL SEIZURES, NOT INTRACTABLE, WITHOUT STATUS EPILEPTICUS (HCC): Primary | ICD-10-CM

## 2022-01-05 PROCEDURE — G8427 DOCREV CUR MEDS BY ELIG CLIN: HCPCS | Performed by: PSYCHIATRY & NEUROLOGY

## 2022-01-05 PROCEDURE — 1036F TOBACCO NON-USER: CPT | Performed by: PSYCHIATRY & NEUROLOGY

## 2022-01-05 PROCEDURE — 3017F COLORECTAL CA SCREEN DOC REV: CPT | Performed by: PSYCHIATRY & NEUROLOGY

## 2022-01-05 PROCEDURE — G8484 FLU IMMUNIZE NO ADMIN: HCPCS | Performed by: PSYCHIATRY & NEUROLOGY

## 2022-01-05 PROCEDURE — 99205 OFFICE O/P NEW HI 60 MIN: CPT | Performed by: PSYCHIATRY & NEUROLOGY

## 2022-01-05 PROCEDURE — 1123F ACP DISCUSS/DSCN MKR DOCD: CPT | Performed by: PSYCHIATRY & NEUROLOGY

## 2022-01-05 PROCEDURE — G8400 PT W/DXA NO RESULTS DOC: HCPCS | Performed by: PSYCHIATRY & NEUROLOGY

## 2022-01-05 PROCEDURE — 4040F PNEUMOC VAC/ADMIN/RCVD: CPT | Performed by: PSYCHIATRY & NEUROLOGY

## 2022-01-05 PROCEDURE — G8417 CALC BMI ABV UP PARAM F/U: HCPCS | Performed by: PSYCHIATRY & NEUROLOGY

## 2022-01-05 PROCEDURE — 1090F PRES/ABSN URINE INCON ASSESS: CPT | Performed by: PSYCHIATRY & NEUROLOGY

## 2022-01-05 NOTE — PROGRESS NOTES
NEUROLOGY CONSULTATION     Chief Complaint   Patient presents with    New Patient       HISTORY OF PRESENT ILLNESS :    Melita Nagy is a 70 y.o. female who is referred by Dr. Garcia Listen   History was obtained from patient  Additional history was obtained from the patient . Patient was admitted to [facility] in [facility] in October 2021. She was having shortness of breath and on the way to the hospital she became slightly less responsive and had a seizure-like episode. She was admitted to the hospital given Ativan and then treated with Keppra. CT head showed chronic white matter changes. She was discharged home. Few days later she was brought back to the hospital with similar symptoms and this time had another generalized tonic-clonic seizure that apparently was witnessed in the emergency room. Patient was transferred to [facility] for neurology consultation and evaluation. Patient had a detailed work-up there. MRI brain showed extensive chronic white matter changes but no acute infarction. EEG monitoring showed some diffuse slowing but no focal seizure activity. Patient was treated and discharged. Her Keppra was changed to lamotrigine. Patient continues to have some memory problems especially for short-term events. She also seems to have some tremors.   However the symptoms have improved compared to the time that she was admitted to the hospital.    REVIEW OF SYSTEMS    Constitutional:  []   Chills   []  Fatigue   []  Fevers   []  Malaise   []  Weight loss     [x] Denies all of the above    Eyes:  []  Double vision   [x]  Blurry vision     [] Denies all of the above    Ears, nose, mouth, throat, and face:   [] Hearing loss    []   Hoarseness      []  Snoring    []  Tinnitus       [x] Denies all of the above     Respiratory:   []  Cough    []  Shortness of breath         [x] Denies all of the above     Cardiovascular:   []  Chest pain    []  Exertional chest pressure/discomfort           [] Palpitations    []  Syncope     [x] Denies all of the above    Gastrointestinal:   []  Abdominal pain   []  Constipation    []  Diarrhea    []   Dysphagia                      [x] Denies all of the above    Genitourinary:      []  Frequency   []  Hematuria     []  Urinary incontinence           [x] Denies all of the above     Hematologic/lymphatic:  []  Bleeding    []  Easy bruising   []  Anemia  [x] Denies all of the above     Musculoskeletal:   [x] Back pain       []  Myalgias    [x]  Neck pain           [] Denies all of the above    Neurological: As noted in HPI    Behavioral/Psych:   [x] Anxiety    [x]  Depression     []  Mood swings     [] Denies all of the above     Endocrine:   []  Temperature intolerance     [] Fatigue      [x] Denies all of the above     Allergic/Immunologic:   [] Hay fever    [x] Denies all of the above     Past Medical History:   Diagnosis Date    Back pain, chronic     Depression     Joint pain     Sleep apnea      Family History   Problem Relation Age of Onset    Heart Failure Mother     COPD Father      Social History     Socioeconomic History    Marital status:      Spouse name: Not on file    Number of children: Not on file    Years of education: Not on file    Highest education level: Not on file   Occupational History    Occupation: retired   Tobacco Use    Smoking status: Never Smoker    Smokeless tobacco: Never Used   Substance and Sexual Activity    Alcohol use: No    Drug use: No    Sexual activity: Not on file   Other Topics Concern    Not on file   Social History Narrative    Not on file     Social Determinants of Health     Financial Resource Strain: Low Risk     Difficulty of Paying Living Expenses: Not hard at all   Food Insecurity: No Food Insecurity    Worried About Running Out of Food in the Last Year: Never true    Nikia of Food in the Last Year: Never true   Transportation Needs:     Lack of Transportation (Medical): Not on file    Lack of Transportation (Non-Medical): Not on file   Physical Activity:     Days of Exercise per Week: Not on file    Minutes of Exercise per Session: Not on file   Stress:     Feeling of Stress : Not on file   Social Connections:     Frequency of Communication with Friends and Family: Not on file    Frequency of Social Gatherings with Friends and Family: Not on file    Attends Religion Services: Not on file    Active Member of [address] [place] [address] [employer] or Organizations: Not on file    Attends Club or Organization Meetings: Not on file    Marital Status: Not on file   Intimate Partner Violence:     Fear of Current or Ex-Partner: Not on file    Emotionally Abused: Not on file    Physically Abused: Not on file    Sexually Abused: Not on file   Housing Stability:     Unable to Pay for Housing in the Last Year: Not on file    Number of [place] in the Last Year: Not on file    Unstable Housing in the Last Year: Not on file       PHYSICAL EXAMINATION:  BP (!) 199/94   Pulse (!) 49   Ht 5' 6\" (1.676 m)   Wt 193 lb (87.5 kg)   BMI 31.15 kg/m²   BP (!) 199/94   Pulse (!) 49   Ht 5' 6\" (1.676 m)   Wt 193 lb (87.5 kg)   BMI 31.15 kg/m²   Appearance: Well appearing, well nourished and in no distress  Mental Status Exam: Patient is alert, oriented to person, place and time. Recent and remote memory is fair  Fund of Knowledge is normal  Attention/concentration is normal.   Speech : No dysarthria  Language : No aphasia  Funduscopic Exam: sharp disc margins  Cranial Nerves:   II: Visual fields:  Full to confrontation  III: Pupils:  equal, round, reactive to light  III,IV,VI: Extra Ocular Movements are intact.  No nystagmus  V: Facial sensation is intact to pin prick and light touch  VII: Facial strength and movements: intact and symmetric smile,cheek puffing and eyebrow elevation  VIII: Hearing:  Intact to finger rub bilaterally  IX: Palate  elevation is symmetric  XI: Shoulder shrug is intact  XII: Tongue movements are normal  Motor:  Muscle tone and bulk are normal.   Strength is symmetrical 5/5 in all four extremities. Sensory: Intact to light touch and  pin prick in all four extremities  Coordination:  Normal  Finger to Nose and Heel to Shin bilaterally    . Reflexes:  DTR 1 and symmetric bilaterally  Plantar response: Flexor bilaterally  Gait: Gait and station is slightly unsteady  Romberg: negative  Vascular: No carotid bruit bilaterally        DATA:  LABS:  General Labs:    CBC:   Lab Results   Component Value Date    WBC 7.8 10/23/2021    WBC 7.0 10/12/2021    RBC 4.55 10/23/2021    HGB 12.7 10/23/2021    HCT 40.7 10/23/2021    MCV 90 10/23/2021    MCH 28 10/23/2021    MCHC 31 10/23/2021    RDW 14.6 10/12/2021     10/23/2021    MPV 10.0 10/23/2021     BMP:    Lab Results   Component Value Date     10/23/2021    K 4.5 10/23/2021     10/23/2021    CO2 20.0 10/23/2021    BUN 11 10/23/2021    LABALBU 4.0 10/12/2021    CREATININE 0.79 10/23/2021    CALCIUM 8.6 10/23/2021    GFRAA 87 10/23/2021    LABGLOM 72 10/23/2021    GLUCOSE 158 10/23/2021     RADIOLOGY REVIEW:  I have reviewed radiology report(s) of: MRI brain  Extensive outside medical records including that of [facility] were reviewed. IMPRESSION :  I suspect that patient had episodes of hypertensive encephalopathy. This would explain the headache altered mental status and even the seizure. MRI brain showing chronic white matter changes could be as a result of the hypertension. There was no acute infarction.   Seizure disorder due to white matter changes in the brain and hypertensive encephalopathy      RECOMMENDATIONS :  Discussed at length with patient and her   Reviewed all the work-up that has been done so far  Recommended that she stay on lamotrigine 100 mg twice daily  I would recommend her blood pressure be aggressively controlled to maintain in the normal range  I will see her back in 3 months for follow-up  Thank you for this consultation        Please note a portion of this chart was generated using dragon dictation software. Although every effort was made to ensure the accuracy of this automated transcription, some errors in transcription may have occurred.

## 2022-01-10 NOTE — TELEPHONE ENCOUNTER
They did send the MRI to [facility]. Dr Ho López is there someone I need to call and check on this for her?

## 2022-01-10 NOTE — TELEPHONE ENCOUNTER
I don't see where they have made comparison views. Can you please check with [facility] and see if it has been compared.  saw

## 2022-01-12 NOTE — TELEPHONE ENCOUNTER
Spoke with [facility] again and spoke with Mee Shoemaker. She stated that they did not receive anything from [facility]. I called [facility] again and spoke with Madi Marin. She sent it digitally and [facility] should have it in the next hour. I will call them on Friday morning first this and make sure they received.

## 2022-01-14 NOTE — TELEPHONE ENCOUNTER
Spoke with Frankey Churn from [facility] and they did get the report from [facility]. They are notifying [facility] now. I did call and leave a message for the pt.

## 2022-01-16 DIAGNOSIS — N63.0 BREAST NODULE: ICD-10-CM

## 2022-01-16 DIAGNOSIS — E04.1 THYROID NODULE: Primary | ICD-10-CM

## 2022-01-18 ENCOUNTER — OFFICE VISIT (OUTPATIENT)
Dept: CARDIOLOGY CLINIC | Age: 72
End: 2022-01-18
Payer: MEDICARE

## 2022-01-18 VITALS
BODY MASS INDEX: 30.86 KG/M2 | WEIGHT: 192 LBS | OXYGEN SATURATION: 96 % | SYSTOLIC BLOOD PRESSURE: 134 MMHG | DIASTOLIC BLOOD PRESSURE: 82 MMHG | HEIGHT: 66 IN | HEART RATE: 56 BPM

## 2022-01-18 DIAGNOSIS — I65.23 BILATERAL CAROTID ARTERY STENOSIS: ICD-10-CM

## 2022-01-18 DIAGNOSIS — I10 ESSENTIAL HYPERTENSION: ICD-10-CM

## 2022-01-18 DIAGNOSIS — E78.5 HYPERLIPIDEMIA, UNSPECIFIED HYPERLIPIDEMIA TYPE: ICD-10-CM

## 2022-01-18 DIAGNOSIS — R00.1 BRADYCARDIA: ICD-10-CM

## 2022-01-18 DIAGNOSIS — R55 SYNCOPE AND COLLAPSE: Primary | ICD-10-CM

## 2022-01-18 PROCEDURE — G8417 CALC BMI ABV UP PARAM F/U: HCPCS | Performed by: INTERNAL MEDICINE

## 2022-01-18 PROCEDURE — 4040F PNEUMOC VAC/ADMIN/RCVD: CPT | Performed by: INTERNAL MEDICINE

## 2022-01-18 PROCEDURE — G8484 FLU IMMUNIZE NO ADMIN: HCPCS | Performed by: INTERNAL MEDICINE

## 2022-01-18 PROCEDURE — 1123F ACP DISCUSS/DSCN MKR DOCD: CPT | Performed by: INTERNAL MEDICINE

## 2022-01-18 PROCEDURE — 3017F COLORECTAL CA SCREEN DOC REV: CPT | Performed by: INTERNAL MEDICINE

## 2022-01-18 PROCEDURE — 99214 OFFICE O/P EST MOD 30 MIN: CPT | Performed by: INTERNAL MEDICINE

## 2022-01-18 PROCEDURE — G8400 PT W/DXA NO RESULTS DOC: HCPCS | Performed by: INTERNAL MEDICINE

## 2022-01-18 PROCEDURE — G8428 CUR MEDS NOT DOCUMENT: HCPCS | Performed by: INTERNAL MEDICINE

## 2022-01-18 PROCEDURE — 1036F TOBACCO NON-USER: CPT | Performed by: INTERNAL MEDICINE

## 2022-01-18 PROCEDURE — 1090F PRES/ABSN URINE INCON ASSESS: CPT | Performed by: INTERNAL MEDICINE

## 2022-01-27 RX ORDER — ERGOCALCIFEROL 1.25 MG/1
CAPSULE ORAL
Qty: 12 CAPSULE | Refills: 3 | Status: SHIPPED | OUTPATIENT
Start: 2022-01-27

## 2022-02-08 ENCOUNTER — TELEPHONE (OUTPATIENT)
Dept: INTERNAL MEDICINE CLINIC | Age: 72
End: 2022-02-08

## 2022-02-08 NOTE — TELEPHONE ENCOUNTER
----- Message from Elier Bergman sent at 2/4/2022  3:14 PM EST -----  Subject: Refill Request    QUESTIONS  Name of Medication? lamoTRIgine (LAMICTAL) 100 MG tablet  Patient-reported dosage and instructions? 1 tablet 2 times daily  How many days do you have left? 0  Preferred Pharmacy? [address] phone number (if available)? [phone]  ---------------------------------------------------------------------------  --------------  CALL BACK INFO  What is the best way for the office to contact you? OK to leave message on   voicemail  Preferred Call Back Phone Number?  [phone]

## 2022-02-11 ENCOUNTER — TELEPHONE (OUTPATIENT)
Dept: INTERNAL MEDICINE CLINIC | Age: 72
End: 2022-02-11

## 2022-02-11 ENCOUNTER — OFFICE VISIT (OUTPATIENT)
Dept: INTERNAL MEDICINE CLINIC | Age: 72
End: 2022-02-11
Payer: MEDICARE

## 2022-02-11 VITALS
HEART RATE: 52 BPM | OXYGEN SATURATION: 95 % | DIASTOLIC BLOOD PRESSURE: 92 MMHG | BODY MASS INDEX: 30.54 KG/M2 | SYSTOLIC BLOOD PRESSURE: 142 MMHG | WEIGHT: 189.2 LBS

## 2022-02-11 DIAGNOSIS — N63.0 BREAST NODULE: ICD-10-CM

## 2022-02-11 DIAGNOSIS — G40.909 SEIZURE DISORDER (HCC): ICD-10-CM

## 2022-02-11 DIAGNOSIS — I48.91 ATRIAL FIBRILLATION, UNSPECIFIED TYPE (HCC): ICD-10-CM

## 2022-02-11 DIAGNOSIS — I10 ESSENTIAL HYPERTENSION: ICD-10-CM

## 2022-02-11 DIAGNOSIS — R41.3 MEMORY LOSS OR IMPAIRMENT: ICD-10-CM

## 2022-02-11 DIAGNOSIS — E11.9 TYPE 2 DIABETES MELLITUS WITHOUT COMPLICATION, WITHOUT LONG-TERM CURRENT USE OF INSULIN (HCC): Primary | ICD-10-CM

## 2022-02-11 DIAGNOSIS — R91.1 PULMONARY NODULE: ICD-10-CM

## 2022-02-11 DIAGNOSIS — E04.1 THYROID NODULE: ICD-10-CM

## 2022-02-11 DIAGNOSIS — E11.9 TYPE 2 DIABETES MELLITUS WITHOUT COMPLICATION, WITHOUT LONG-TERM CURRENT USE OF INSULIN (HCC): ICD-10-CM

## 2022-02-11 LAB
A/G RATIO: 1.4 (ref 1.1–2.2)
ALBUMIN SERPL-MCNC: 4.4 G/DL (ref 3.4–5)
ALP BLD-CCNC: 85 U/L (ref 40–129)
ALT SERPL-CCNC: 12 U/L (ref 10–40)
ANION GAP SERPL CALCULATED.3IONS-SCNC: 19 MMOL/L (ref 3–16)
AST SERPL-CCNC: 15 U/L (ref 15–37)
BILIRUB SERPL-MCNC: 0.5 MG/DL (ref 0–1)
BUN BLDV-MCNC: 25 MG/DL (ref 7–20)
CALCIUM SERPL-MCNC: 10.1 MG/DL (ref 8.3–10.6)
CHLORIDE BLD-SCNC: 101 MMOL/L (ref 99–110)
CO2: 21 MMOL/L (ref 21–32)
CREAT SERPL-MCNC: 1.3 MG/DL (ref 0.6–1.2)
GFR AFRICAN AMERICAN: 49
GFR NON-AFRICAN AMERICAN: 40
GLUCOSE BLD-MCNC: 118 MG/DL (ref 70–99)
POTASSIUM SERPL-SCNC: 4.6 MMOL/L (ref 3.5–5.1)
SODIUM BLD-SCNC: 141 MMOL/L (ref 136–145)
TOTAL PROTEIN: 7.5 G/DL (ref 6.4–8.2)

## 2022-02-11 PROCEDURE — 3044F HG A1C LEVEL LT 7.0%: CPT | Performed by: INTERNAL MEDICINE

## 2022-02-11 PROCEDURE — G8484 FLU IMMUNIZE NO ADMIN: HCPCS | Performed by: INTERNAL MEDICINE

## 2022-02-11 PROCEDURE — G8400 PT W/DXA NO RESULTS DOC: HCPCS | Performed by: INTERNAL MEDICINE

## 2022-02-11 PROCEDURE — 1123F ACP DISCUSS/DSCN MKR DOCD: CPT | Performed by: INTERNAL MEDICINE

## 2022-02-11 PROCEDURE — G8417 CALC BMI ABV UP PARAM F/U: HCPCS | Performed by: INTERNAL MEDICINE

## 2022-02-11 PROCEDURE — 3017F COLORECTAL CA SCREEN DOC REV: CPT | Performed by: INTERNAL MEDICINE

## 2022-02-11 PROCEDURE — 99214 OFFICE O/P EST MOD 30 MIN: CPT | Performed by: INTERNAL MEDICINE

## 2022-02-11 PROCEDURE — 2022F DILAT RTA XM EVC RTNOPTHY: CPT | Performed by: INTERNAL MEDICINE

## 2022-02-11 PROCEDURE — G8427 DOCREV CUR MEDS BY ELIG CLIN: HCPCS | Performed by: INTERNAL MEDICINE

## 2022-02-11 PROCEDURE — 4040F PNEUMOC VAC/ADMIN/RCVD: CPT | Performed by: INTERNAL MEDICINE

## 2022-02-11 PROCEDURE — 1090F PRES/ABSN URINE INCON ASSESS: CPT | Performed by: INTERNAL MEDICINE

## 2022-02-11 PROCEDURE — 1036F TOBACCO NON-USER: CPT | Performed by: INTERNAL MEDICINE

## 2022-02-11 RX ORDER — AMLODIPINE BESYLATE 10 MG/1
10 TABLET ORAL DAILY
COMMUNITY
Start: 2021-02-26 | End: 2022-02-11 | Stop reason: SDUPTHER

## 2022-02-11 RX ORDER — EMPAGLIFLOZIN 25 MG/1
25 TABLET, FILM COATED ORAL DAILY
Qty: 90 TABLET | Refills: 3 | Status: SHIPPED | OUTPATIENT
Start: 2022-02-11

## 2022-02-11 RX ORDER — AMLODIPINE BESYLATE 10 MG/1
10 TABLET ORAL DAILY
Qty: 90 TABLET | Refills: 3 | Status: SHIPPED | OUTPATIENT
Start: 2022-02-11

## 2022-02-11 RX ORDER — EMPAGLIFLOZIN 25 MG/1
25 TABLET, FILM COATED ORAL DAILY
COMMUNITY
Start: 2022-01-14 | End: 2022-02-11 | Stop reason: SDUPTHER

## 2022-02-11 ASSESSMENT — ENCOUNTER SYMPTOMS
CHEST TIGHTNESS: 0
SHORTNESS OF BREATH: 0
CONSTIPATION: 0
DIARRHEA: 0

## 2022-02-11 NOTE — PROGRESS NOTES
Patient: Winter Lyon is a 70 y.o. female who presents today with the following Chief Complaint(s):  No chief complaint on file. HPI     Here for follow up. No further seizures since second admission. Did see Dr. Eric Robert who felt that seizures were related hypertensive encephalopathy. Remains on Lacmictal 100 mg BID. Note reviewed. Mood-wise doing \"alright\". Does have tremors in her hands. HTN- has been taking metoprolol, amlodipine, and Micardis. BP has been high since her seizures started in October. DM- not taking Jardiance as was not sure why she had it. Is taking metoformin. HLD- on Crestor     Memory has worsened since her hospitalization in October/November. Is having diagnostic mammogram and US on left breast on Monday. Is having Thyroid US on Monday as well.      Lab Results   Component Value Date    LABA1C 6.1 02/11/2022     Lab Results   Component Value Date    .4 02/11/2022     Lab Results   Component Value Date    LABA1C 6.1 02/11/2022    LABA1C 6.4 10/12/2021    LABA1C 6.0 06/03/2021     Lab Results   Component Value Date    LABMICR 147.60 (H) 10/12/2021    LDLCALC 69 10/12/2021    CREATININE 1.3 (H) 02/11/2022     Lab Results   Component Value Date     02/11/2022    K 4.6 02/11/2022     02/11/2022    CO2 21 02/11/2022    BUN 25 (H) 02/11/2022    CREATININE 1.3 (H) 02/11/2022    GLUCOSE 118 (H) 02/11/2022    CALCIUM 10.1 02/11/2022    PROT 7.5 02/11/2022    LABALBU 4.4 02/11/2022    BILITOT 0.5 02/11/2022    ALKPHOS 85 02/11/2022    AST 15 02/11/2022    ALT 12 02/11/2022    LABGLOM 40 (A) 02/11/2022    GFRAA 49 (A) 02/11/2022    AGRATIO 1.4 02/11/2022    GLOB 2.9 10/12/2021     Lab Results   Component Value Date    CHOL 164 10/12/2021    CHOL 172 02/26/2021    CHOL 144 09/10/2020     Lab Results   Component Value Date    TRIG 130 10/12/2021    TRIG 187 (H) 02/26/2021    TRIG 195 (H) 09/10/2020     Lab Results   Component Value Date    HDL 69 (H) 10/12/2021    HDL 42 02/26/2021    HDL 41 09/10/2020     Lab Results   Component Value Date    LDLCALC 69 10/12/2021    LDLCALC 93 02/26/2021    LDLCALC 64 09/10/2020     Lab Results   Component Value Date    LABVLDL 26 10/12/2021    LABVLDL 37 02/26/2021    LABVLDL 39 09/10/2020     No results found for: CHOLHDLRATIO            Allergies   Allergen Reactions    Compazine [Prochlorperazine Maleate] Shortness Of Breath     dyspnea    Morphine Nausea And Vomiting     N&V      Past Medical History:   Diagnosis Date    Back pain, chronic     Depression     Joint pain     Sleep apnea       Past Surgical History:   Procedure Laterality Date    LUMBAR FUSION  2016    PARTIAL HYSTERECTOMY  2002    SPINAL CORD STIMULATOR SURGERY      Transmit receive head coil only      Social History     Socioeconomic History    Marital status:      Spouse name: Not on file    Number of children: Not on file    Years of education: Not on file    Highest education level: Not on file   Occupational History    Occupation: retired   Tobacco Use    Smoking status: Never Smoker    Smokeless tobacco: Never Used   Vaping Use    Vaping Use: Never used   Substance and Sexual Activity    Alcohol use: No    Drug use: No    Sexual activity: Not on file   Other Topics Concern    Not on file   Social History Narrative    Not on file     Social Determinants of Health     Financial Resource Strain: Low Risk     Difficulty of Paying Living Expenses: Not hard at all   Food Insecurity: No Food Insecurity    Worried About [address] in the Last Year: Never true    [facility] in the Last Year: Never true   Transportation Needs:     Lack of Transportation (Medical): Not on file    Lack of Transportation (Non-Medical):  Not on file   Physical Activity:     Days of Exercise per Week: Not on file    Minutes of Exercise per Session: Not on file   Stress:     Feeling of Stress : Not on file   Social Connections:     Frequency of Communication with Friends and Family: Not on file    Frequency of Social Gatherings with Friends and Family: Not on file    Attends Buddhist Services: Not on file    Active Member of Clubs or Organizations: Not on file    Attends Club or Organization Meetings: Not on file    Marital Status: Not on file   Intimate Partner Violence:     Fear of Current or Ex-Partner: Not on file    Emotionally Abused: Not on file    Physically Abused: Not on file    Sexually Abused: Not on file   Housing Stability:     Unable to Pay for Housing in the Last Year: Not on file    Number of [place] in the Last Year: Not on file    Unstable Housing in the Last Year: Not on file     Family History   Problem Relation Age of Onset    Heart Failure Mother     COPD Father         Outpatient Medications Prior to Visit   Medication Sig Dispense Refill    amLODIPine (NORVASC) 10 MG tablet Take 10 mg by mouth daily      lamoTRIgine (LAMICTAL) 100 MG tablet TAKE 1 TABLET TWICE A  tablet 1    vitamin D (ERGOCALCIFEROL) 1.25 MG (62529 UT) CAPS capsule TAKE 1 CAPSULE ONCE A WEEK 12 capsule 3    predniSONE (DELTASONE) 5 MG tablet TAKE 1 TABLET DAILY 90 tablet 3    gabapentin (NEURONTIN) 300 MG capsule Take 300 mg by mouth nightly.  aspirin EC 81 MG EC tablet Take 81 mg by mouth daily      blood glucose monitor kit and supplies Check sugars qd and prn 1 kit 0    blood glucose monitor strips Test 1 times a day & as needed for symptoms of irregular blood glucose.  100 strip 3    Lancets MISC 1 each by Does not apply route daily 100 each 5    metFORMIN (GLUCOPHAGE-XR) 500 MG extended release tablet TAKE 1 TABLET TWICE A DAY WITH MEALS 180 tablet 1    escitalopram (LEXAPRO) 20 MG tablet Take 1 tablet by mouth daily 90 tablet 3    rosuvastatin (CRESTOR) 20 MG tablet Take 1 tablet by mouth daily 90 tablet 3    omeprazole (PRILOSEC) 20 MG delayed release capsule Take 1 capsule by mouth Daily = 90 capsule 3    metoprolol succinate (TOPROL XL) 100 MG extended release tablet Take 1 tablet by mouth 2 times daily 180 tablet 3    telmisartan (MICARDIS) 80 MG tablet Take 1 tablet by mouth daily 90 tablet 3    niacin 500 MG extended release capsule Take 500 mg by mouth nightly      Magnesium Hydroxide (MAGNESIA PO) Take 50 mg by mouth daily       CPAP Machine MISC by Does not apply route      JARDIANCE 25 MG tablet Take 25 mg by mouth daily       No facility-administered medications prior to visit. Patient'spast medical history, surgical history, family history, medications,  and allergies  were all reviewed and updated as appropriate today. Review of Systems   Constitutional: Negative for appetite change, fatigue and fever. Respiratory: Negative for chest tightness and shortness of breath. Cardiovascular: Negative for chest pain. Gastrointestinal: Negative for constipation and diarrhea. Skin: Negative for rash. Neurological: Positive for tremors and weakness. BP (!) 142/92   Pulse 52   Wt 189 lb 3.2 oz (85.8 kg)   SpO2 95%   BMI 30.54 kg/m²   Physical Exam  Vitals and nursing note reviewed. Constitutional:       Appearance: She is well-developed. She is not toxic-appearing. HENT:      Head: Normocephalic. Right Ear: Tympanic membrane, ear canal and external ear normal.      Left Ear: Tympanic membrane, ear canal and external ear normal.      Mouth/Throat:      Pharynx: No oropharyngeal exudate or posterior oropharyngeal erythema. Eyes:      General: No scleral icterus. Extraocular Movements: Extraocular movements intact. Conjunctiva/sclera: Conjunctivae normal.      Pupils: Pupils are equal, round, and reactive to light. Neck:      Thyroid: No thyroid mass or thyromegaly. Vascular: No carotid bruit. Cardiovascular:      Rate and Rhythm: Normal rate and regular rhythm. Heart sounds: Normal heart sounds. No murmur heard.       Pulmonary:      Effort: Pulmonary effort is normal.      Breath sounds: Normal breath sounds. Musculoskeletal:      Right lower leg: No edema. Left lower leg: No edema. Lymphadenopathy:      Cervical: No cervical adenopathy. Neurological:      General: No focal deficit present. Mental Status: She is alert and oriented to person, place, and time. Psychiatric:         Mood and Affect: Mood normal.         Behavior: Behavior normal. Behavior is cooperative. ASSESSMENT/PLAN:    Problem List Items Addressed This Visit     Atrial fibrillation ([address]     Possible. Reportedly seen on EKG during admission of [facility] during illness in October 2021. No further recurrence of A. fib. Did see Dr. Evelyn Christianson who checked echocardiogram and event monitor without evidence of A. fib. Not currently on anticoagulation. Breast nodule      Seen on CT of chest.  Scheduled for diagnostic mammogram and ultrasound next week. Essential hypertension     Blood pressure remains high. Needs improved blood pressure control. Continue Micardis 80 mg daily, amlodipine 10 mg daily, and metoprolol  mg twice daily. Patient is starting Jardiance 25 mg daily due to proteinuria. This should help with blood pressure control. If not, will need to likely add Aldactone. Memory loss or impairment      Worse since hospitalization and seizure in October November 2021. Is following with Dr. Eric Robert for neurology. Repeat SLUMS at next visit. Pulmonary nodule      Stable from October to December 2021. Repeat CT of chest in June 2022. Seizure disorder [facility])      Following with Dr. Eric Robert. No no further seizures. Remains on Lamictal 100 mg twice daily. Dr. Eric Robert felt that her seizures were related to hypertensive encephalopathy. Blood pressure have been well controlled prior to this incident. Thyroid nodule     Seen on CT of chest.  Check ultrasound of thyroid.          Type 2 diabetes mellitus without complication, without long-term current use of insulin ([facility]) - Primary      Controlled with hemoglobin A1c of 6.4% but she was recently found to have microalbuminuria. [employer] was started in November but patient never took it as she had forgotten why it was started. Start Jardiance 25 mg daily. Continue Metformin  mg twice daily. Educated patient regarding SGLT2's and risk of UTI and urogenital yeast infections. Relevant Medications    JARDIANCE 25 MG tablet    Other Relevant Orders    COMPREHENSIVE METABOLIC PANEL (Completed)    HEMOGLOBIN A1C (Completed)          Current Outpatient Medications   Medication Sig Dispense Refill    JARDIANCE 25 MG tablet Take 25 mg by mouth daily 90 tablet 3    lamoTRIgine (LAMICTAL) 100 MG tablet TAKE 1 TABLET TWICE A  tablet 1    vitamin D (ERGOCALCIFEROL) 1.25 MG (81143 UT) CAPS capsule TAKE 1 CAPSULE ONCE A WEEK 12 capsule 3    predniSONE (DELTASONE) 5 MG tablet TAKE 1 TABLET DAILY 90 tablet 3    gabapentin (NEURONTIN) 300 MG capsule Take 300 mg by mouth nightly.  aspirin EC 81 MG EC tablet Take 81 mg by mouth daily      blood glucose monitor kit and supplies Check sugars qd and prn 1 kit 0    blood glucose monitor strips Test 1 times a day & as needed for symptoms of irregular blood glucose.  100 strip 3    Lancets MISC 1 each by Does not apply route daily 100 each 5    escitalopram (LEXAPRO) 20 MG tablet Take 1 tablet by mouth daily 90 tablet 3    rosuvastatin (CRESTOR) 20 MG tablet Take 1 tablet by mouth daily 90 tablet 3    omeprazole (PRILOSEC) 20 MG delayed release capsule Take 1 capsule by mouth Daily = 90 capsule 3    metoprolol succinate (TOPROL XL) 100 MG extended release tablet Take 1 tablet by mouth 2 times daily 180 tablet 3    telmisartan (MICARDIS) 80 MG tablet Take 1 tablet by mouth daily 90 tablet 3    niacin 500 MG extended release capsule Take 500 mg by mouth nightly      Magnesium Hydroxide (MAGNESIA PO) Take 50 mg by mouth daily       CPAP Machine MISC by Does not apply route      metFORMIN (GLUCOPHAGE-XR) 500 MG extended release tablet TAKE 1 TABLET TWICE A DAY WITH MEALS 180 tablet 3    amLODIPine (NORVASC) 10 MG tablet Take 1 tablet by mouth daily 90 tablet 3     No current facility-administered medications for this visit. Return in about 6 weeks (around 3/25/2022) for HTN f/u.

## 2022-02-11 NOTE — PATIENT INSTRUCTIONS
For blood pressure: Please confirm that you are taking amlodipine 10 mg. Please let me know if you are taking it or not. If you are NOT taking amlodipine, please go back on it. If you ARE taking amlodipine, I will need to add more medication. You do need to take Jardiance as well as metformin. Beverely Bud will help to lower blood sugars, may lower your blood pressure, and is protective of your kidneys. I started you on Jardiance as you had a lot of protein in your urine when it was checked in October and Beverely Bud will help to protect your kidneys. Jardiance  helps your blood sugars by causing your kidneys to filter out excess sugar into your urine. In doing this, it does increase your risk of hbladder infections and yeast infections. To prevent this, please make sure that you drink lots of water, you urinate when you need to, and clean well (use wet wipes)  following urination. You may also feel light headed as a result of this medication as it does cause you to urinate more and may help to lower your blood pressure. Please let me know if this is happening. Please drink plenty of water. This medication is very good as it will help you lose weight and is protective of your heart. Please ask Dr. Cleveland about your memory and the tremors. The nodule in your lung is likely a benign granuloma.

## 2022-02-12 LAB
ESTIMATED AVERAGE GLUCOSE: 128.4 MG/DL
HBA1C MFR BLD: 6.1 %

## 2022-02-14 ENCOUNTER — HOSPITAL ENCOUNTER (OUTPATIENT)
Dept: WOMENS IMAGING | Age: 72
Discharge: HOME | End: 2022-02-14
Payer: MEDICARE

## 2022-02-14 ENCOUNTER — HOSPITAL ENCOUNTER (OUTPATIENT)
Dept: ULTRASOUND IMAGING | Age: 72
Discharge: HOME | End: 2022-02-14
Payer: MEDICARE

## 2022-02-14 DIAGNOSIS — N63.0 BREAST NODULE: ICD-10-CM

## 2022-02-14 DIAGNOSIS — E04.1 THYROID NODULE: ICD-10-CM

## 2022-02-14 DIAGNOSIS — E11.9 TYPE 2 DIABETES MELLITUS WITHOUT COMPLICATION, WITHOUT LONG-TERM CURRENT USE OF INSULIN (HCC): ICD-10-CM

## 2022-02-14 PROCEDURE — G0279 TOMOSYNTHESIS, MAMMO: HCPCS

## 2022-02-14 PROCEDURE — 76536 US EXAM OF HEAD AND NECK: CPT

## 2022-02-14 RX ORDER — METFORMIN HYDROCHLORIDE 500 MG/1
TABLET, EXTENDED RELEASE ORAL
Qty: 180 TABLET | Refills: 3 | Status: SHIPPED | OUTPATIENT
Start: 2022-02-14

## 2022-02-15 DIAGNOSIS — E04.1 THYROID NODULE: Primary | ICD-10-CM

## 2022-02-20 PROBLEM — K52.9 GASTROENTERITIS: Status: RESOLVED | Noted: 2021-11-07 | Resolved: 2022-02-20

## 2022-02-20 PROBLEM — R91.1 PULMONARY NODULE: Status: ACTIVE | Noted: 2022-02-20

## 2022-02-20 PROBLEM — M54.2 ACUTE NECK PAIN: Status: RESOLVED | Noted: 2020-04-29 | Resolved: 2022-02-20

## 2022-02-20 PROBLEM — E04.1 THYROID NODULE: Status: ACTIVE | Noted: 2022-02-20

## 2022-02-20 PROBLEM — R55 SYNCOPE AND COLLAPSE: Status: RESOLVED | Noted: 2021-12-15 | Resolved: 2022-02-20

## 2022-02-20 PROBLEM — N30.00 ACUTE CYSTITIS WITHOUT HEMATURIA: Status: RESOLVED | Noted: 2021-11-07 | Resolved: 2022-02-20

## 2022-02-20 PROBLEM — R00.1 BRADYCARDIA: Status: RESOLVED | Noted: 2021-02-26 | Resolved: 2022-02-20

## 2022-02-20 PROBLEM — M79.10 MUSCLE PAIN: Status: RESOLVED | Noted: 2018-05-10 | Resolved: 2022-02-20

## 2022-02-20 PROBLEM — N63.0 BREAST NODULE: Status: ACTIVE | Noted: 2022-02-20

## 2022-02-20 PROBLEM — G40.909 SEIZURE DISORDER (HCC): Status: ACTIVE | Noted: 2021-11-07

## 2022-02-20 PROBLEM — R06.09 DYSPNEA ON EXERTION: Status: RESOLVED | Noted: 2018-11-07 | Resolved: 2022-02-20

## 2022-02-20 NOTE — ASSESSMENT & PLAN NOTE
Possible. Reportedly seen on EKG during admission of [facility] during illness in October 2021. No further recurrence of A. fib. Did see Dr. Tiffany March who checked echocardiogram and event monitor without evidence of A. fib. Not currently on anticoagulation.

## 2022-02-20 NOTE — ASSESSMENT & PLAN NOTE
Controlled with hemoglobin A1c of 6.4% but she was recently found to have microalbuminuria. [place] [employer] was started in November but patient never took it as she had forgotten why it was started. Start Jardiance 25 mg daily. Continue Metformin  mg twice daily. Educated patient regarding SGLT2's and risk of UTI and urogenital yeast infections.

## 2022-02-20 NOTE — ASSESSMENT & PLAN NOTE
Stable from October to December 2021. Repeat CT of chest in June 2022. Patient Unable To Complete Cage Questionnaire Due To Medical Issue (Limited Life Expectancy): No

## 2022-02-20 NOTE — ASSESSMENT & PLAN NOTE
Worse since hospitalization and seizure in October November 2021. Is following with Dr. Aidan Goetz for neurology. Repeat SLUMS at next visit.

## 2022-02-20 NOTE — ASSESSMENT & PLAN NOTE
Following with Dr. Nan Tran. No no further seizures. Remains on Lamictal 100 mg twice daily. Dr. Nan Tran felt that her seizures were related to hypertensive encephalopathy. Blood pressure have been well controlled prior to this incident.

## 2022-03-02 ENCOUNTER — OFFICE VISIT (OUTPATIENT)
Dept: ENT CLINIC | Age: 72
End: 2022-03-02
Payer: MEDICARE

## 2022-03-02 VITALS
HEIGHT: 66 IN | SYSTOLIC BLOOD PRESSURE: 122 MMHG | BODY MASS INDEX: 30.7 KG/M2 | DIASTOLIC BLOOD PRESSURE: 76 MMHG | WEIGHT: 191 LBS

## 2022-03-02 DIAGNOSIS — R56.9 SEIZURES (HCC): ICD-10-CM

## 2022-03-02 DIAGNOSIS — E04.1 THYROID NODULE: Primary | ICD-10-CM

## 2022-03-02 DIAGNOSIS — L98.9 FACIAL SKIN LESION: ICD-10-CM

## 2022-03-02 PROCEDURE — G8484 FLU IMMUNIZE NO ADMIN: HCPCS | Performed by: STUDENT IN AN ORGANIZED HEALTH CARE EDUCATION/TRAINING PROGRAM

## 2022-03-02 PROCEDURE — 1036F TOBACCO NON-USER: CPT | Performed by: STUDENT IN AN ORGANIZED HEALTH CARE EDUCATION/TRAINING PROGRAM

## 2022-03-02 PROCEDURE — 1123F ACP DISCUSS/DSCN MKR DOCD: CPT | Performed by: STUDENT IN AN ORGANIZED HEALTH CARE EDUCATION/TRAINING PROGRAM

## 2022-03-02 PROCEDURE — 3017F COLORECTAL CA SCREEN DOC REV: CPT | Performed by: STUDENT IN AN ORGANIZED HEALTH CARE EDUCATION/TRAINING PROGRAM

## 2022-03-02 PROCEDURE — G8427 DOCREV CUR MEDS BY ELIG CLIN: HCPCS | Performed by: STUDENT IN AN ORGANIZED HEALTH CARE EDUCATION/TRAINING PROGRAM

## 2022-03-02 PROCEDURE — 99203 OFFICE O/P NEW LOW 30 MIN: CPT | Performed by: STUDENT IN AN ORGANIZED HEALTH CARE EDUCATION/TRAINING PROGRAM

## 2022-03-02 PROCEDURE — G8400 PT W/DXA NO RESULTS DOC: HCPCS | Performed by: STUDENT IN AN ORGANIZED HEALTH CARE EDUCATION/TRAINING PROGRAM

## 2022-03-02 PROCEDURE — G8417 CALC BMI ABV UP PARAM F/U: HCPCS | Performed by: STUDENT IN AN ORGANIZED HEALTH CARE EDUCATION/TRAINING PROGRAM

## 2022-03-02 PROCEDURE — 4040F PNEUMOC VAC/ADMIN/RCVD: CPT | Performed by: STUDENT IN AN ORGANIZED HEALTH CARE EDUCATION/TRAINING PROGRAM

## 2022-03-02 PROCEDURE — 1090F PRES/ABSN URINE INCON ASSESS: CPT | Performed by: STUDENT IN AN ORGANIZED HEALTH CARE EDUCATION/TRAINING PROGRAM

## 2022-03-02 NOTE — PROGRESS NOTES
[address] [place] [address] (:  1950) is a 70 y.o. female, here for evaluation of the following chief complaint(s):  Thyroid Problem      ASSESSMENT/PLAN:  1. Thyroid nodule  -     US THYROID; Future  2. Seizures ([address]  3. Facial skin lesion      This is a very pleasant 70 y.o. female here today for evaluation of the the above-noted complaints. I reviewed the patient's thyroid ultrasound and it shows evidence of a 2 cm TI-RADS 4 nodule in the isthmus of the thyroid. Her most recent TSH was normal in 2021 at 1.26. Based on size criteria the official recommendation would be to obtain an ultrasound-guided FNA. The patient would like to observe this for now and get a repeat ultrasound in 6 months. She states that since she started having seizures she has developed multiple other medical issues including tremors memory loss etc.  She would prefer to address these first and would like to see me back to review the thyroid nodule. I will see the patient back in 6 months with repeat ultrasound. The patient understands that there is a very slight risk in not obtaining a biopsy now, as this could lead to a delayed diagnosis of an underlying thyroid cancer. She understands that this is a reasonable risk. Medical Decision Making: The following items were considered in medical decision making:  Independent review of images  Review / order clinical lab tests  Review / order radiology tests  Decision to obtain old records  Review and summation of old records as accessed through [facility] if applicable    SUBJECTIVE/OBJECTIVE:  CARLEE Sandoval is here today for evaluation of issues related to her thyroid. The patient states that she recently developed seizures. Since that time she has developed memory loss and tremors. She had an extensive work-up done and it revealed an underlying thyroid nodule as one of her scans. I reviewed that today. I also reviewed recent blood work. She reports no issue related to her thyroid in the past.  She has had normal thyroid stimulating hormones. She states that she has no issues related to dysphagia or dysphonia. She has no family history of thyroid cancer or radiation of the head and neck. REVIEW OF SYSTEMS  The following systems were reviewed and revealed the following in addition to any already discussed in the HPI:    PHYSICAL EXAM    GENERAL: No acute distress, alert and oriented, no hoarseness, strong voice  EYES: EOMI, Anti-icteric  HENT:   Head: Normocephalic and atraumatic. Face:  Symmetric, facial nerve intact  Right Ear: Normal external ear, normal external auditory canal, intact tympanic membrane with normal mobility and aerated middle ear  Left Ear: Normal external ear, normal external auditory canal, intact tympanic membrane with normal mobility and aerated middle ear  Mouth/Oral Cavity:  normal lips, Uvula is midline, no mucosal lesions,  Nose:Normal external nasal appearance. No evidence of nasal lesions  NECK: Normal range of motion, no thyromegaly, trachea is midline, no lymphadenopathy, no neck masses, no crepitus, excessive skin overlying the area of the thyroid. Several raised 1 mm keratinaceous lesions of the neck and forehead. PROCEDURE      This note was generated completely or in part utilizing Dragon dictation speech recognition software. Occasionally, words are mistranscribed and despite editing, the text may contain inaccuracies due to incorrect word recognition. If further clarification is needed please contact the office at [phone]. An electronic signature was used to authenticate this note.     --Gab Rodarte MD

## 2022-03-11 ENCOUNTER — TELEPHONE (OUTPATIENT)
Dept: INTERNAL MEDICINE CLINIC | Age: 72
End: 2022-03-11

## 2022-03-11 ENCOUNTER — TELEMEDICINE (OUTPATIENT)
Dept: INTERNAL MEDICINE CLINIC | Age: 72
End: 2022-03-11
Payer: MEDICARE

## 2022-03-11 DIAGNOSIS — R05.9 COUGH: Primary | ICD-10-CM

## 2022-03-11 DIAGNOSIS — R09.81 NASAL CONGESTION: ICD-10-CM

## 2022-03-11 DIAGNOSIS — R53.83 OTHER FATIGUE: ICD-10-CM

## 2022-03-11 PROCEDURE — 1036F TOBACCO NON-USER: CPT | Performed by: NURSE PRACTITIONER

## 2022-03-11 PROCEDURE — 1123F ACP DISCUSS/DSCN MKR DOCD: CPT | Performed by: NURSE PRACTITIONER

## 2022-03-11 PROCEDURE — G8417 CALC BMI ABV UP PARAM F/U: HCPCS | Performed by: NURSE PRACTITIONER

## 2022-03-11 PROCEDURE — 1090F PRES/ABSN URINE INCON ASSESS: CPT | Performed by: NURSE PRACTITIONER

## 2022-03-11 PROCEDURE — 4040F PNEUMOC VAC/ADMIN/RCVD: CPT | Performed by: NURSE PRACTITIONER

## 2022-03-11 PROCEDURE — 3017F COLORECTAL CA SCREEN DOC REV: CPT | Performed by: NURSE PRACTITIONER

## 2022-03-11 PROCEDURE — G8484 FLU IMMUNIZE NO ADMIN: HCPCS | Performed by: NURSE PRACTITIONER

## 2022-03-11 PROCEDURE — G8400 PT W/DXA NO RESULTS DOC: HCPCS | Performed by: NURSE PRACTITIONER

## 2022-03-11 PROCEDURE — 99213 OFFICE O/P EST LOW 20 MIN: CPT | Performed by: NURSE PRACTITIONER

## 2022-03-11 PROCEDURE — G8427 DOCREV CUR MEDS BY ELIG CLIN: HCPCS | Performed by: NURSE PRACTITIONER

## 2022-03-11 ASSESSMENT — ENCOUNTER SYMPTOMS
WHEEZING: 0
EYE PAIN: 0
NAUSEA: 0
ABDOMINAL PAIN: 0
SINUS PRESSURE: 1
COUGH: 1
SHORTNESS OF BREATH: 0
TROUBLE SWALLOWING: 0
SORE THROAT: 1
DIARRHEA: 0
RHINORRHEA: 1
CONSTIPATION: 0
VOMITING: 0

## 2022-03-11 NOTE — TELEPHONE ENCOUNTER
Patient is requesting that Dr Hudson Yeh send a prescription for amoxicillin to pharmacy for her. She states she has cough and congestion since Wednesday morning. No fever and coughing up clear mucus. Patient uses [facility] in [place], [place].

## 2022-03-11 NOTE — PROGRESS NOTES
TELEHEALTH EVALUATION -- Audio/Visual (During CJWQI-29 public health emergency)  Acute Office Visit  3/11/2022    SUBJECTIVE:    Patient ID: Maribel Turner is a 70 y.o. female. Chief Complaint   Patient presents with    Congestion     couple days of cough and congestion     HPI: The patient presents for an acute visit. Pt reports cough, fatigue and nasal congestion for the last two days. Productive cough per pt. Had a sore throat, vomiting and diarrhea that resolved. Denies fevers or chills. Denies nausea or abdominal pain. Denies CP, SOB or wheezing. Treatment tried and response - cough medication OTC  Not vaccinated for COVID-19. Vaccinated for influenza per pt. Recent ill contacts?  and son  Tobacco use or second hand smoke exposure - No    Allergies   Allergen Reactions    Compazine [Prochlorperazine Maleate] Shortness Of Breath     dyspnea    Morphine Nausea And Vomiting     N&V     Current Outpatient Medications   Medication Sig Dispense Refill    metFORMIN (GLUCOPHAGE-XR) 500 MG extended release tablet TAKE 1 TABLET TWICE A DAY WITH MEALS 180 tablet 3    JARDIANCE 25 MG tablet Take 25 mg by mouth daily 90 tablet 3    amLODIPine (NORVASC) 10 MG tablet Take 1 tablet by mouth daily 90 tablet 3    lamoTRIgine (LAMICTAL) 100 MG tablet TAKE 1 TABLET TWICE A  tablet 1    vitamin D (ERGOCALCIFEROL) 1.25 MG (89773 UT) CAPS capsule TAKE 1 CAPSULE ONCE A WEEK 12 capsule 3    predniSONE (DELTASONE) 5 MG tablet TAKE 1 TABLET DAILY 90 tablet 3    gabapentin (NEURONTIN) 300 MG capsule Take 300 mg by mouth nightly.  aspirin EC 81 MG EC tablet Take 81 mg by mouth daily      blood glucose monitor kit and supplies Check sugars qd and prn 1 kit 0    blood glucose monitor strips Test 1 times a day & as needed for symptoms of irregular blood glucose.  100 strip 3    Lancets MISC 1 each by Does not apply route daily 100 each 5    escitalopram (LEXAPRO) 20 MG tablet Take 1 tablet by mouth daily 90 tablet 3    rosuvastatin (CRESTOR) 20 MG tablet Take 1 tablet by mouth daily 90 tablet 3    omeprazole (PRILOSEC) 20 MG delayed release capsule Take 1 capsule by mouth Daily = 90 capsule 3    metoprolol succinate (TOPROL XL) 100 MG extended release tablet Take 1 tablet by mouth 2 times daily 180 tablet 3    telmisartan (MICARDIS) 80 MG tablet Take 1 tablet by mouth daily 90 tablet 3    niacin 500 MG extended release capsule Take 500 mg by mouth nightly      Magnesium Hydroxide (MAGNESIA PO) Take 50 mg by mouth daily       CPAP Machine MISC by Does not apply route       No current facility-administered medications for this visit. Review of Systems   Constitutional: Positive for fatigue. Negative for appetite change, chills, diaphoresis and fever. HENT: Positive for congestion, postnasal drip, rhinorrhea, sinus pressure and sore throat. Negative for drooling, ear discharge, ear pain, hearing loss, sneezing and trouble swallowing. Eyes: Negative for pain and visual disturbance. Respiratory: Positive for cough. Negative for shortness of breath and wheezing. Cardiovascular: Negative for chest pain and palpitations. Gastrointestinal: Negative for abdominal pain, constipation, diarrhea, nausea and vomiting. Skin: Negative for pallor. Neurological: Negative for dizziness, light-headedness and headaches. OBJECTIVE:  Video Virtual Visit  Patient-Reported Vitals 3/11/2022   Patient-Reported Weight 191 lb   Patient-Reported Height 5 6   Patient-Reported Systolic -   Patient-Reported Diastolic -    ^no access to other VS d/t VV per pt. Physical Exam  Constitutional:       General: She is not in acute distress. Appearance: Normal appearance. She is not ill-appearing. Pulmonary:      Effort: Pulmonary effort is normal. No respiratory distress. Skin:     Coloration: Skin is not jaundiced or pale. Neurological:      Mental Status: She is alert.       Comments: No facial asymmetry noted   Psychiatric:         Mood and Affect: Mood normal.     Due to this being a TeleHealth encounter, evaluation of the following organ systems is limited: Vitals/Constitutional/EENT/Resp/CV/GI//MS/Neuro/Skin/Heme-Lymph-Imm. ASSESSMENT/PLAN:  Andry Blackmon was seen today for congestion. Diagnoses and all orders for this visit:    Cough/Nasal congestion/Other fatigue  -    Symptoms for 2 days. Patient does not appear in acute distress. - Not vaccinated for COVID-19.  - Recommend COVID-19 testing. Pt agreeable  - Quarantine per CDC guidelines recommended. - Symptomatic management reviewed with the patient  - COVID-19; Future  - Pt will call if symptoms worsen or fail to improve  - Red flag warning signs reviewed with the pt and she will go to the ER if these occur. Return for as previously scheduled or sooner if needed. Michelle Monae, was evaluated through a synchronous (real-time) audio-video encounter. The patient (or guardian if applicable) is aware that this is a billable service, which includes applicable co-pays. This Virtual Visit was conducted with patient's (and/or legal guardian's) consent. The visit was conducted pursuant to the emergency declaration under the [address] [place] [address] authority and the [employer] General Act. Patient identification was verified, and a caregiver was present when appropriate. The patient was located in a state where the provider was licensed to provide care.     Consent:  He and/or health care decision maker is aware that that he may receive a bill for this virtual service, depending on his insurance coverage, and has provided verbal consent to proceed: Yes    Patient identification was verified at the start of the visit: Yes    Total time spent on this encounter: Not billed by time    Services were provided through a video synchronous discussion virtually to substitute for in-person clinic visit. Patient and provider were located at their individual homes. All questions answered. Pt states no further questions or concerns at this time.    Electronically signed by: LYNDSEY Estrella CNP 03/11/22

## 2022-03-17 DIAGNOSIS — I10 ESSENTIAL HYPERTENSION: ICD-10-CM

## 2022-03-17 RX ORDER — TELMISARTAN 80 MG/1
TABLET ORAL
Qty: 90 TABLET | Refills: 1 | Status: SHIPPED | OUTPATIENT
Start: 2022-03-17

## 2022-03-18 ENCOUNTER — TELEPHONE (OUTPATIENT)
Dept: INTERNAL MEDICINE CLINIC | Age: 72
End: 2022-03-18

## 2022-03-18 NOTE — TELEPHONE ENCOUNTER
Pt returning call from you on Tues---pt aware you are out until Mon---please call pt at her home number. Thanks.

## 2022-03-30 ENCOUNTER — OFFICE VISIT (OUTPATIENT)
Dept: INTERNAL MEDICINE CLINIC | Age: 72
End: 2022-03-30
Payer: MEDICARE

## 2022-03-30 ENCOUNTER — TELEPHONE (OUTPATIENT)
Dept: ADMINISTRATIVE | Age: 72
End: 2022-03-30

## 2022-03-30 VITALS
WEIGHT: 190.6 LBS | OXYGEN SATURATION: 99 % | SYSTOLIC BLOOD PRESSURE: 142 MMHG | DIASTOLIC BLOOD PRESSURE: 100 MMHG | HEART RATE: 76 BPM | BODY MASS INDEX: 30.76 KG/M2

## 2022-03-30 DIAGNOSIS — I10 ESSENTIAL HYPERTENSION: Primary | ICD-10-CM

## 2022-03-30 DIAGNOSIS — F32.A ANXIETY AND DEPRESSION: ICD-10-CM

## 2022-03-30 DIAGNOSIS — M51.36 DDD (DEGENERATIVE DISC DISEASE), LUMBAR: ICD-10-CM

## 2022-03-30 DIAGNOSIS — F41.9 ANXIETY AND DEPRESSION: ICD-10-CM

## 2022-03-30 DIAGNOSIS — E78.5 HYPERLIPIDEMIA LDL GOAL <70: ICD-10-CM

## 2022-03-30 DIAGNOSIS — E11.9 TYPE 2 DIABETES MELLITUS WITHOUT COMPLICATION, WITHOUT LONG-TERM CURRENT USE OF INSULIN (HCC): ICD-10-CM

## 2022-03-30 PROCEDURE — 2022F DILAT RTA XM EVC RTNOPTHY: CPT | Performed by: INTERNAL MEDICINE

## 2022-03-30 PROCEDURE — 1036F TOBACCO NON-USER: CPT | Performed by: INTERNAL MEDICINE

## 2022-03-30 PROCEDURE — 1123F ACP DISCUSS/DSCN MKR DOCD: CPT | Performed by: INTERNAL MEDICINE

## 2022-03-30 PROCEDURE — 99214 OFFICE O/P EST MOD 30 MIN: CPT | Performed by: INTERNAL MEDICINE

## 2022-03-30 PROCEDURE — 3017F COLORECTAL CA SCREEN DOC REV: CPT | Performed by: INTERNAL MEDICINE

## 2022-03-30 PROCEDURE — G8427 DOCREV CUR MEDS BY ELIG CLIN: HCPCS | Performed by: INTERNAL MEDICINE

## 2022-03-30 PROCEDURE — G8417 CALC BMI ABV UP PARAM F/U: HCPCS | Performed by: INTERNAL MEDICINE

## 2022-03-30 PROCEDURE — G8484 FLU IMMUNIZE NO ADMIN: HCPCS | Performed by: INTERNAL MEDICINE

## 2022-03-30 PROCEDURE — 3044F HG A1C LEVEL LT 7.0%: CPT | Performed by: INTERNAL MEDICINE

## 2022-03-30 PROCEDURE — 1090F PRES/ABSN URINE INCON ASSESS: CPT | Performed by: INTERNAL MEDICINE

## 2022-03-30 PROCEDURE — G8400 PT W/DXA NO RESULTS DOC: HCPCS | Performed by: INTERNAL MEDICINE

## 2022-03-30 PROCEDURE — 4040F PNEUMOC VAC/ADMIN/RCVD: CPT | Performed by: INTERNAL MEDICINE

## 2022-03-30 RX ORDER — SPIRONOLACTONE 25 MG/1
25 TABLET ORAL DAILY
Qty: 30 TABLET | Refills: 3 | Status: SHIPPED | OUTPATIENT
Start: 2022-03-30 | End: 2022-03-30 | Stop reason: CLARIF

## 2022-03-30 RX ORDER — BUPRENORPHINE 5 UG/H
1 PATCH TRANSDERMAL WEEKLY
Qty: 4 PATCH | Refills: 1 | Status: SHIPPED | OUTPATIENT
Start: 2022-03-30 | End: 2022-05-29

## 2022-03-30 RX ORDER — SPIRONOLACTONE 25 MG/1
25 TABLET ORAL DAILY
Qty: 30 TABLET | Refills: 3 | Status: SHIPPED | OUTPATIENT
Start: 2022-03-30 | End: 2022-04-26

## 2022-03-30 ASSESSMENT — ENCOUNTER SYMPTOMS
DIARRHEA: 0
CONSTIPATION: 0
SHORTNESS OF BREATH: 0
CHEST TIGHTNESS: 0

## 2022-03-30 NOTE — PROGRESS NOTES
Patient: Esther Whitehead is a 67 y.o. female who presents today with the following Chief Complaint(s):  Chief Complaint   Patient presents with    Check-Up       HPI     Here today for follow up on blood pressure. HTN- BP remains elevated. Has been taking Jardiance and not amlodipine. Is still struggling with memory/word finding. Depression is worse- related to pain, memory lapses; illness. Agreeable to referral to psychiatry. Just started Jardiance for DM. Wondering about getting spinal stimulator removed. Has chronic low back pain. Is constantly in pain. Pain impacts her quality of life. Limits activity due to pain.      Wt Readings from Last 3 Encounters:   03/30/22 190 lb 9.6 oz (86.5 kg)   03/02/22 191 lb (86.6 kg)   02/11/22 189 lb 3.2 oz (85.8 kg)     Temp Readings from Last 3 Encounters:   11/17/20 97 °F (36.1 °C)   08/21/20 98.1 °F (36.7 °C)   11/07/18 97.6 °F (36.4 °C)     BP Readings from Last 3 Encounters:   03/30/22 (!) 142/100   03/02/22 122/76   02/11/22 (!) 142/92     Pulse Readings from Last 3 Encounters:   03/30/22 76   02/11/22 52   01/18/22 56         Allergies   Allergen Reactions    Compazine [Prochlorperazine Maleate] Shortness Of Breath     dyspnea    Morphine Nausea And Vomiting     N&V      Past Medical History:   Diagnosis Date    Back pain, chronic     Depression     Joint pain     Sleep apnea       Past Surgical History:   Procedure Laterality Date    LUMBAR FUSION  2016    PARTIAL HYSTERECTOMY  2002    SPINAL CORD STIMULATOR SURGERY      Transmit receive head coil only      Social History     Socioeconomic History    Marital status:      Spouse name: Not on file    Number of children: Not on file    Years of education: Not on file    Highest education level: Not on file   Occupational History    Occupation: retired   Tobacco Use    Smoking status: Never Smoker    Smokeless tobacco: Never Used   Vaping Use    Vaping Use: Never used   Substance and Sexual Activity    Alcohol use: No    Drug use: No    Sexual activity: Not on file   Other Topics Concern    Not on file   Social History Narrative    Not on file     Social Determinants of Health     Financial Resource Strain: Low Risk     Difficulty of Paying Living Expenses: Not hard at all   Food Insecurity: No Food Insecurity    Worried About Running Out of Food in the Last Year: Never true    [address] in the Last Year: Never true   Transportation Needs:     Lack of Transportation (Medical): Not on file    Lack of Transportation (Non-Medical):  Not on file   Physical Activity:     Days of Exercise per Week: Not on file    Minutes of Exercise per Session: Not on file   Stress:     Feeling of Stress : Not on file   Social Connections:     Frequency of Communication with Friends and Family: Not on file    Frequency of Social Gatherings with Friends and Family: Not on file    Attends [employer]oration[employer] Services: Not on file    Active Member of [address] [place] [address] or Organizations: Not on file    Attends Club or Organization Meetings: Not on file    Marital Status: Not on file   Intimate Partner Violence:     Fear of Current or Ex-Partner: Not on file    Emotionally Abused: Not on file    Physically Abused: Not on file    Sexually Abused: Not on file   Housing Stability:     Unable to Pay for Housing in the Last Year: Not on file    Number of [place] in the Last Year: Not on file    Unstable Housing in the Last Year: Not on file     Family History   Problem Relation Age of Onset    Heart Failure Mother     COPD Father         Outpatient Medications Prior to Visit   Medication Sig Dispense Refill    telmisartan (MICARDIS) 80 MG tablet TAKE 1 TABLET DAILY 90 tablet 1    metFORMIN (GLUCOPHAGE-XR) 500 MG extended release tablet TAKE 1 TABLET TWICE A DAY WITH MEALS 180 tablet 3    JARDIANCE 25 MG tablet Take 25 mg by mouth daily 90 tablet 3    amLODIPine (NORVASC) 10 MG tablet Take 1 tablet by mouth daily 90 tablet 3    lamoTRIgine (LAMICTAL) 100 MG tablet TAKE 1 TABLET TWICE A  tablet 1    vitamin D (ERGOCALCIFEROL) 1.25 MG (68013 UT) CAPS capsule TAKE 1 CAPSULE ONCE A WEEK 12 capsule 3    predniSONE (DELTASONE) 5 MG tablet TAKE 1 TABLET DAILY 90 tablet 3    gabapentin (NEURONTIN) 300 MG capsule Take 300 mg by mouth nightly.  aspirin EC 81 MG EC tablet Take 81 mg by mouth daily      blood glucose monitor kit and supplies Check sugars qd and prn 1 kit 0    blood glucose monitor strips Test 1 times a day & as needed for symptoms of irregular blood glucose. 100 strip 3    Lancets MISC 1 each by Does not apply route daily 100 each 5    escitalopram (LEXAPRO) 20 MG tablet Take 1 tablet by mouth daily 90 tablet 3    rosuvastatin (CRESTOR) 20 MG tablet Take 1 tablet by mouth daily 90 tablet 3    omeprazole (PRILOSEC) 20 MG delayed release capsule Take 1 capsule by mouth Daily = 90 capsule 3    metoprolol succinate (TOPROL XL) 100 MG extended release tablet Take 1 tablet by mouth 2 times daily 180 tablet 3    niacin 500 MG extended release capsule Take 500 mg by mouth nightly      Magnesium Hydroxide (MAGNESIA PO) Take 50 mg by mouth daily       CPAP Machine MISC by Does not apply route       No facility-administered medications prior to visit. Patient'spast medical history, surgical history, family history, medications,  and allergies  were all reviewed and updated as appropriate today. Review of Systems   Constitutional: Negative for appetite change, fatigue and fever. Respiratory: Negative for chest tightness and shortness of breath. Cardiovascular: Negative for chest pain. Gastrointestinal: Negative for constipation and diarrhea. Skin: Negative for rash. BP (!) 142/100   Pulse 76   Wt 190 lb 9.6 oz (86.5 kg)   SpO2 99%   BMI 30.76 kg/m²   Physical Exam  Vitals and nursing note reviewed. Constitutional:       Appearance: She is well-developed.  She is not toxic-appearing. HENT:      Head: Normocephalic. Right Ear: Tympanic membrane, ear canal and external ear normal.      Left Ear: Tympanic membrane, ear canal and external ear normal.      Mouth/Throat:      Pharynx: No oropharyngeal exudate or posterior oropharyngeal erythema. Eyes:      General: No scleral icterus. Extraocular Movements: Extraocular movements intact. Conjunctiva/sclera: Conjunctivae normal.      Pupils: Pupils are equal, round, and reactive to light. Neck:      Thyroid: No thyroid mass or thyromegaly. Vascular: No carotid bruit. Cardiovascular:      Rate and Rhythm: Regular rhythm. Bradycardia present. Heart sounds: Normal heart sounds. No murmur heard. Pulmonary:      Effort: Pulmonary effort is normal.      Breath sounds: Normal breath sounds. Musculoskeletal:      Right lower leg: No edema. Left lower leg: No edema. Lymphadenopathy:      Cervical: No cervical adenopathy. Neurological:      General: No focal deficit present. Mental Status: She is alert and oriented to person, place, and time. Psychiatric:         Mood and Affect: Mood normal.         Behavior: Behavior normal. Behavior is cooperative. ASSESSMENT/PLAN:    Problem List Items Addressed This Visit     Anxiety and depression     Struggling with depression currently. Relates depression to worsening health and chronic pain. Patient did not do well on Cymbalta. Continue Lexapro 20 mg daily. Refer to Dr. Sweta Boyle for psychiatry. Relevant Orders    External Referral to Psychiatry    DDD (degenerative disc disease), lumbar     Has tried multiple procedures to help with pain including LESI, nerve ablation, and spinal stimulator. Gabapentin has not been helpful. She is unable to take NSAIDs due to chronic kidney disease. She is taking prednisone 5 mg daily.   She is now agreeable to trying a low-dose narcotic as her pain is significantly impacting her quality of life and contributing to her depression. She is hesitant to try narcotics as her son has a history of narcotic abuse. Trial of Butrans patch 5 mcg weekly. Relevant Medications    buprenorphine (BUTRANS) 5 MCG/HR PTWK    Essential hypertension - Primary     Blood pressure has been difficult to control since patient had COVID. Blood pressure remains above goal.  Continue Micardis 80 mg daily, amlodipine 10 mg daily, metoprolol  mg twice daily, and Jardiance 25 mg daily. Start Aldactone 25 mg daily. Check BMP in 1 to 2 weeks after starting Aldactone. May need to see nephrology. Relevant Medications    spironolactone (ALDACTONE) 25 MG tablet    Other Relevant Orders    Basic Metabolic Panel    Hyperlipidemia LDL goal <70               Relevant Medications    spironolactone (ALDACTONE) 25 MG tablet    Other Relevant Orders    Comprehensive Metabolic Panel    Lipid Panel    TSH with Reflex    Type 2 diabetes mellitus without complication, without long-term current use of insulin (HCC)      No side effects with Jardiance. Continue Jardiance 25 mg daily and Metformin  mg twice daily. Relevant Orders    CBC with Auto Differential    Comprehensive Metabolic Panel    Hemoglobin A1C    Lipid Panel    Microalbumin / Creatinine Urine Ratio          Current Outpatient Medications   Medication Sig Dispense Refill    spironolactone (ALDACTONE) 25 MG tablet Take 1 tablet by mouth daily 30 tablet 3    buprenorphine (BUTRANS) 5 MCG/HR PTWK Place 1 patch onto the skin once a week for 60 days.  4 patch 1    telmisartan (MICARDIS) 80 MG tablet TAKE 1 TABLET DAILY 90 tablet 1    metFORMIN (GLUCOPHAGE-XR) 500 MG extended release tablet TAKE 1 TABLET TWICE A DAY WITH MEALS 180 tablet 3    JARDIANCE 25 MG tablet Take 25 mg by mouth daily 90 tablet 3    amLODIPine (NORVASC) 10 MG tablet Take 1 tablet by mouth daily 90 tablet 3    lamoTRIgine (LAMICTAL) 100 MG tablet TAKE 1 TABLET TWICE A  tablet 1    vitamin D (ERGOCALCIFEROL) 1.25 MG (46365 UT) CAPS capsule TAKE 1 CAPSULE ONCE A WEEK 12 capsule 3    predniSONE (DELTASONE) 5 MG tablet TAKE 1 TABLET DAILY 90 tablet 3    gabapentin (NEURONTIN) 300 MG capsule Take 300 mg by mouth nightly.  aspirin EC 81 MG EC tablet Take 81 mg by mouth daily      blood glucose monitor kit and supplies Check sugars qd and prn 1 kit 0    blood glucose monitor strips Test 1 times a day & as needed for symptoms of irregular blood glucose. 100 strip 3    Lancets MISC 1 each by Does not apply route daily 100 each 5    escitalopram (LEXAPRO) 20 MG tablet Take 1 tablet by mouth daily 90 tablet 3    rosuvastatin (CRESTOR) 20 MG tablet Take 1 tablet by mouth daily 90 tablet 3    omeprazole (PRILOSEC) 20 MG delayed release capsule Take 1 capsule by mouth Daily = 90 capsule 3    metoprolol succinate (TOPROL XL) 100 MG extended release tablet Take 1 tablet by mouth 2 times daily 180 tablet 3    niacin 500 MG extended release capsule Take 500 mg by mouth nightly      Magnesium Hydroxide (MAGNESIA PO) Take 50 mg by mouth daily       CPAP Machine MISC by Does not apply route       No current facility-administered medications for this visit. Return in about 6 weeks (around 5/11/2022).

## 2022-03-30 NOTE — TELEPHONE ENCOUNTER
Submitted PA for Buprenorphine 5MCG/HR weekly patches    Via CMM Key: [id number] STATUS: APPROVED effective 02/28/2022-12/31/2099    Please notify patient.  Thank you

## 2022-03-30 NOTE — PATIENT INSTRUCTIONS
You should be taking amlodipine (which is Norvasc), Mircardis (Temisartan), and metoprolol. Start spironolactone 25 mg qd. You should continue to take Jardiance. You will need 1 non-fasting blood test in about 2 weeks and a fasting blood test prior to your appointment in May. Butrans patch 5 mcg change once weekly. Patient Education        buprenorphine transdermal (skin patch)  Pronunciation: BUE pre NOR feen  Brand: Butrans  What is the most important information I should know about buprenorphine transdermal?  You should not use this medicine if you have severe asthma or breathingproblems, or a blockage in your stomach or intestines. MISUSE OF THIS MEDICINE CAN CAUSE ADDICTION, OVERDOSE, OR DEATH. Keep the medication in a place where others cannot get to it. Using this medicine during pregnancy may cause life-threatening withdrawal symptoms in the . Fatal side effects can occur if you use this medicine with alcohol, or with other drugs that cause drowsiness or slow your breathing. What is buprenorphine transdermal?  Buprenorphine transdermal is an opioid pain medication that is used for around-the-clock treatment of moderate to severe chronic pain that is not controlled by other medicines. This medicine is not for use on an as-needed basis for pain. Buprenorphine transdermal may also be used for purposes not listed in thismedication guide. What should I discuss with my health care provider before using buprenorphine transdermal?  You should not use buprenorphine if you are allergic to it, or if you have:   a severe breathing problem; or   a blockage in your stomach or intestines.   Tell your doctor if you have ever had:   a head injury, brain tumor, or seizures;   breathing problems, sleep apnea;   alcoholism, drug addiction, mental illness;   urination problems;   liver or kidney disease;   heart rhythm problems, long QT syndrome; or   problems with your gallbladder, pancreas, or thyroid. If you use buprenorphine while you are pregnant, your baby could become dependent on the drug. This can cause life-threatening withdrawal symptoms in the baby after it is born. Babies born dependent on habit-forming medicine may need medical treatment for several weeks. Tell your doctor if you are pregnant or plan tobecome pregnant. Do not breast-feed while using buprenorphine. This medicine can breathing problems or death in a nursing baby. How should I use buprenorphine transdermal?  Follow the directions on your prescription label and read all medication guides. Never use buprenorphine in larger amounts, or for longer than prescribed. Tell your doctor if you feel an increased urge to use more of this medicine. Never share this medicine with another person, especially someone with a history of drug abuse or addiction. MISUSE CAN CAUSE ADDICTION, OVERDOSE, OR DEATH. Keep the medicine in a place where others cannot get to it. Selling or givingaway buprenorphine is against the law. The skin patch is for use only on the skin. Do not apply near your eyes, nose,mouth, or lips. If you touch the sticky side of a skin patch, wash the skin with clear water and seek medical care at once. Do not use a buprenorphine transdermal skin patch if it has been cut or damaged. Never wear more than 1 patch at a time unless your doctor has told you to. Do not wear a skin patch on a part of your body where a child could reach or remove the patch from your skin. Avoid allowing children to watch you put on a skin patch. Never tell a childthat the buprenorphine skin patch is a \"bandage. \"  Do not stop using this medicine suddenly after long-term use, or you could have unpleasant withdrawal symptoms. Ask your doctor how tosafely stop using buprenorphine. Store unopened patches at room temperature. Keep track of how many skin patches have been used from each new package.  Buprenorphine is a drug of abuse and you will affect you. Dizziness or drowsiness can cause falls, accidents, or severeinjuries. What are the possible side effects of buprenorphine transdermal?  Get emergency medical help if you have signs of an allergic reaction: hives; difficult breathing; swelling of your face, lips, tongue, or throat. Opioid medicine can slow or stop your breathing, and death may occur. A person caring for you should seek emergency medical attention if you have slow breathing with long pauses, blue colored lips, or if you are hard to wakeup. Stop using buprenorphine and call your doctor at once if you have:   weak or shallow breathing, deep sighs, snoring that is new or unusual;   breathing that stops during sleep;   chest pain, fast heart rate, seizure (convulsions);   a light-headed feeling, like you might pass out;   blisters, swelling, or severe irritation where the patch was worn;   adrenal gland problems --nausea, vomiting, loss of appetite, dizziness, feeling weak or tired; or   liver problems --upper stomach pain, dark urine, ross-colored stools, jaundice (yellowing of the skin or eyes). Seek medical attention right away if you have symptoms of serotonin syndrome, such as: agitation, hallucinations, fever, sweating, shivering, fast heart rate, musclestiffness, twitching, loss of coordination, nausea, vomiting, or diarrhea. Serious side effects may be more likely in older adults and those who are overweight, malnourished, or debilitated. Common side effects may include:   constipation, nausea, vomiting;   headache, dizziness, drowsiness, tiredness; or   redness, itching, or rash where the patch was worn. This is not a complete list of side effects and others may occur. Call your doctor for medical advice about side effects. You may report side effects toFDA at [phone].   What other drugs will affect buprenorphine transdermal?  You may have breathing problems or withdrawal symptoms if you start or stop taking certain other medicines. Tell your doctor if you also use an antibiotic, antifungal medication, heart or blood pressure medication, seizure medication, or medicine to treat HIV orhepatitis C. Opioid medication can interact with many other drugs and cause dangerous side effects or death. Be sure your doctor knows if you also use:   cold or allergy medicines, bronchodilator asthma/COPD medication, or a diuretic (\"water pill\");   medicines for motion sickness, irritable bowel syndrome, or overactive bladder;   other narcotic medications --opioid pain medicine or prescription cough medicine;   a sedative like Valium --diazepam, alprazolam, lorazepam, Xanax, Klonopin, Ativan, and others;   drugs that make you sleepy or slow your breathing --a sleeping pill, muscle relaxer, medicine to treat mood disorders or mental illness; or   drugs that affect serotonin levels in your body --a stimulant, or medicine for depression, Parkinson's disease, migraine headaches, serious infections, or nausea and vomiting. This list is not complete. Other drugs may affect buprenorphine, including prescription and over-the-counter medicines, vitamins, and herbal products. Not all possible interactions are listed here. Where can I get more information? Your pharmacist can provide more information about buprenorphine. Remember, keep this and all other medicines out of the reach of children, never share your medicines with others, and use this medication only for the indication prescribed. Every effort has been made to ensure that the information provided by [address] is accurate, up-to-date, and complete, but no guarantee is made to that effect. Drug information contained herein may be time sensitive.  [facility] information has been compiled for use by healthcare practitioners and consumers in the [place] and therefore [employer] does not warrant that uses outside of the [place] are appropriate, unless specifically indicated otherwise. [facility]'s drug information does not endorse drugs, diagnose patients or recommend therapy. [facility] drug information is an informational resource designed to assist licensed healthcare practitioners in caring for their patients and/or to serve consumers viewing this service as a supplement to, and not a substitute for, the expertise, skill, knowledge and judgment of healthcare practitioners. The absence of a warning for a given drug or drug combination in no way should be construed to indicate that the drug or drug combination is safe, effective or appropriate for any given patient. [facility] does not assume any responsibility for any aspect of healthcare administered with the aid of information [facility] provides. The information contained herein is not intended to cover all possible uses, directions, precautions, warnings, drug interactions, allergic reactions, or adverse effects. If you have questions about the drugs you are taking, check with yourdoctor, nurse or pharmacist.  Copyright 9689-0807 [address]. Version: 5.03. Revision date:10/14/2019. Care instructions adapted under license by [facility] ([facility]). If you have questions about a medical condition or this instruction, always ask your healthcare professional. [place] [address] any warranty or liability for your use of this information.

## 2022-04-03 NOTE — ASSESSMENT & PLAN NOTE
Blood pressure has been difficult to control since patient had COVID. Blood pressure remains above goal.  Continue Micardis 80 mg daily, amlodipine 10 mg daily, metoprolol  mg twice daily, and Jardiance 25 mg daily. Start Aldactone 25 mg daily. Check BMP in 1 to 2 weeks after starting Aldactone. May need to see nephrology.

## 2022-04-03 NOTE — ASSESSMENT & PLAN NOTE
Has tried multiple procedures to help with pain including LESI, nerve ablation, and spinal stimulator. Gabapentin has not been helpful. She is unable to take NSAIDs due to chronic kidney disease. She is taking prednisone 5 mg daily. She is now agreeable to trying a low-dose narcotic as her pain is significantly impacting her quality of life and contributing to her depression. She is hesitant to try narcotics as her son has a history of narcotic abuse. Trial of Butrans patch 5 mcg weekly.

## 2022-04-03 NOTE — ASSESSMENT & PLAN NOTE
Struggling with depression currently. Relates depression to worsening health and chronic pain. Patient did not do well on Cymbalta. Continue Lexapro 20 mg daily. Refer to Dr. Joseph Nix for psychiatry.

## 2022-04-06 ENCOUNTER — OFFICE VISIT (OUTPATIENT)
Dept: NEUROLOGY | Age: 72
End: 2022-04-06
Payer: MEDICARE

## 2022-04-06 VITALS
SYSTOLIC BLOOD PRESSURE: 190 MMHG | HEIGHT: 66 IN | HEART RATE: 73 BPM | BODY MASS INDEX: 30.53 KG/M2 | DIASTOLIC BLOOD PRESSURE: 85 MMHG | WEIGHT: 190 LBS

## 2022-04-06 DIAGNOSIS — R90.82 WHITE MATTER ABNORMALITY ON MRI OF BRAIN: ICD-10-CM

## 2022-04-06 DIAGNOSIS — G40.209 PARTIAL SYMPTOMATIC EPILEPSY WITH COMPLEX PARTIAL SEIZURES, NOT INTRACTABLE, WITHOUT STATUS EPILEPTICUS (HCC): Primary | ICD-10-CM

## 2022-04-06 PROCEDURE — 99214 OFFICE O/P EST MOD 30 MIN: CPT | Performed by: PSYCHIATRY & NEUROLOGY

## 2022-04-06 PROCEDURE — 4040F PNEUMOC VAC/ADMIN/RCVD: CPT | Performed by: PSYCHIATRY & NEUROLOGY

## 2022-04-06 PROCEDURE — G8427 DOCREV CUR MEDS BY ELIG CLIN: HCPCS | Performed by: PSYCHIATRY & NEUROLOGY

## 2022-04-06 PROCEDURE — 1036F TOBACCO NON-USER: CPT | Performed by: PSYCHIATRY & NEUROLOGY

## 2022-04-06 PROCEDURE — 1123F ACP DISCUSS/DSCN MKR DOCD: CPT | Performed by: PSYCHIATRY & NEUROLOGY

## 2022-04-06 PROCEDURE — 1090F PRES/ABSN URINE INCON ASSESS: CPT | Performed by: PSYCHIATRY & NEUROLOGY

## 2022-04-06 PROCEDURE — G8400 PT W/DXA NO RESULTS DOC: HCPCS | Performed by: PSYCHIATRY & NEUROLOGY

## 2022-04-06 PROCEDURE — G8417 CALC BMI ABV UP PARAM F/U: HCPCS | Performed by: PSYCHIATRY & NEUROLOGY

## 2022-04-06 PROCEDURE — 3017F COLORECTAL CA SCREEN DOC REV: CPT | Performed by: PSYCHIATRY & NEUROLOGY

## 2022-04-06 NOTE — PROGRESS NOTES
Johanna Query   Neurology followup    Subjective:   CC/HP  History was obtained from the patient. History was obtained from the patient's . Patient has not had any further seizures since her last office visit. Patient is on lamotrigine 100 mg twice daily. Patient states that her blood pressure still not well controlled. She has been following with her primary physician regarding that. Background history:  Patient was admitted to [facility] in [facility] in October 2021. She was having shortness of breath and on the way to the hospital she became slightly less responsive and had a seizure-like episode. She was admitted to the hospital given Ativan and then treated with Keppra. CT head showed chronic white matter changes. She was discharged home. Few days later she was brought back to the hospital with similar symptoms and this time had another generalized tonic-clonic seizure that apparently was witnessed in the emergency room. Patient was transferred to [facility] for neurology consultation and evaluation. Patient had a detailed work-up there. MRI brain showed extensive chronic white matter changes but no acute infarction. EEG monitoring showed some diffuse slowing but no focal seizure activity. Patient was treated and discharged. Her Keppra was changed to lamotrigine. Patient continues to have some memory problems especially for short-term events. She also seems to have some tremors.   However the symptoms have improved compared to the time that she was admitted to the hospital    REVIEW OF SYSTEMS    Constitutional:  []   Chills   []  Fatigue   []  Fevers   []  Malaise   []  Weight loss     [x] Denies all of the above    Respiratory:   []  Cough    [x]  Shortness of breath         [] Denies all of the above     Cardiovascular:   []  Chest pain    []  Exertional chest pressure/discomfort           [] Palpitations    []  Syncope     [x] Denies all of the above        Past Medical History:   Diagnosis Date    Back pain, chronic     Depression     Joint pain     Sleep apnea      Family History   Problem Relation Age of Onset    Heart Failure Mother     COPD Father      Social History     Socioeconomic History    Marital status:      Spouse name: Not on file    Number of children: Not on file    Years of education: Not on file    Highest education level: Not on file   Occupational History    Occupation: retired   Tobacco Use    Smoking status: Never Smoker    Smokeless tobacco: Never Used   Vaping Use    Vaping Use: Never used   Substance and Sexual Activity    Alcohol use: No    Drug use: No    Sexual activity: Not on file   Other Topics Concern    Not on file   Social History Narrative    Not on file     Social Determinants of Health     Financial Resource Strain: Low Risk     Difficulty of Paying Living Expenses: Not hard at all   Food Insecurity: No Food Insecurity    Worried About [address] in the Last Year: Never true    [address] in the Last Year: Never true   Transportation Needs:     Lack of Transportation (Medical): Not on file    Lack of Transportation (Non-Medical):  Not on file   Physical Activity:     Days of Exercise per Week: Not on file    Minutes of Exercise per Session: Not on file   Stress:     Feeling of Stress : Not on file   Social Connections:     Frequency of Communication with Friends and Family: Not on file    Frequency of Social Gatherings with Friends and Family: Not on file    Attends Jain Services: Not on file    Active Member of Clubs or Organizations: Not on file    Attends Club or Organization Meetings: Not on file    Marital Status: Not on file   Intimate Partner Violence:     Fear of Current or Ex-Partner: Not on file    Emotionally Abused: Not on file    Physically Abused: Not on file    Sexually Abused: Not on file   Housing Stability:     Unable to Pay for Housing in the Last Year: Not on file    Number of Places Lived in the Last Year: Not on file    Unstable Housing in the Last Year: Not on file        Objective:  Exam:  BP (!) 190/85   Pulse 73   Ht 5' 6\" (1.676 m)   Wt 190 lb (86.2 kg)   BMI 30.67 kg/m²   This is a well-nourished patient in no acute distress  Patient is awake, alert and oriented x3. Speech is normal.  Pupils are equal round reacting to light. Extraocular movements intact. Face symmetrical. Tongue midline. Motor exam shows normal symmetrical strength. Deep tendon reflexes normal. Plantar reflexes downgoing. Sensory exam normal. Coordination normal. Gait slightly unsteady. No carotid bruit. No neck stiffness. Data :  LABS:  General Labs:    CBC:   Lab Results   Component Value Date    WBC 7.8 10/23/2021    WBC 7.0 10/12/2021    RBC 4.55 10/23/2021    HGB 12.7 10/23/2021    HCT 40.7 10/23/2021    MCV 90 10/23/2021    MCH 28 10/23/2021    MCHC 31 10/23/2021    RDW 14.6 10/12/2021     10/23/2021    MPV 10.0 10/23/2021     BMP:    Lab Results   Component Value Date     02/11/2022    K 4.6 02/11/2022     02/11/2022    CO2 21 02/11/2022    BUN 25 02/11/2022    LABALBU 4.4 02/11/2022    CREATININE 1.3 02/11/2022    CALCIUM 10.1 02/11/2022    GFRAA 49 02/11/2022    LABGLOM 40 02/11/2022    GLUCOSE 118 02/11/2022    GLUCOSE 158 10/23/2021     RADIOLOGY REVIEW:  I have reviewed radiology report(s) of: MRI brain    Impression :  Episodes of hypertensive encephalopathy  Chronic white matter changes  Seizures probably due to white matter changes in the brain and hypertensive encephalopathy  Cognitive impairment probably due to white matter disease    Plan :  Discussed with patient and her   Continue on lamotrigine 100 mg twice daily  I will get a lamotrigine level done  I will consider repeating her MRI brain in 6 months. I would recommend continued aggressive control of hypertension.   I will see her back in 6 months        Please note a portion of  this chart was generated using dragon dictation software. Although every effort was made to ensure the accuracy of this automated transcription, some errors in transcription may have occurred.

## 2022-04-12 DIAGNOSIS — I10 ESSENTIAL HYPERTENSION: ICD-10-CM

## 2022-04-12 RX ORDER — METOPROLOL SUCCINATE 100 MG/1
TABLET, EXTENDED RELEASE ORAL
Qty: 180 TABLET | Refills: 3 | Status: SHIPPED | OUTPATIENT
Start: 2022-04-12 | End: 2022-10-04 | Stop reason: SDUPTHER

## 2022-04-21 ENCOUNTER — NURSE TRIAGE (OUTPATIENT)
Dept: OTHER | Facility: CLINIC | Age: 72
End: 2022-04-21

## 2022-04-21 NOTE — TELEPHONE ENCOUNTER
Received call from [facility] at the [facility] with Red Flag Complaint. Subjective: Caller states \"Last time this happened I had seizures and I don't want it to get there again\"     Current Symptoms: Sore throat with redness - worse on left side, no white spots reported, dry cough, N/V/D and headache. No emesis today, diarrhea x 1 in 24hrs. Symptoms worse in the morning upon waking. Denies SOB. Denies visual changes with HA. Tolerating PO intake, urinating per normal. Denies fever. Onset: 3 days ago; unchanged    Associated Symptoms: reduced appetite    Pain Severity: 7/10; burning; constant    Temperature: Denies    What has been tried: Gatorade, OTC analgesics     LMP: NA Pregnant: No    Recommended disposition: See in Office Today - Patient would like to go to [facility] if unable to get seen in office. Care advice provided, patient verbalizes understanding; denies any other questions or concerns; instructed to call back for any new or worsening symptoms. Patient/Caller agrees with recommended disposition; writer provided warm transfer to [place] [id number] at [facility] for appointment scheduling     Attention Provider: Thank you for allowing me to participate in the care of your patient. The patient was connected to triage in response to information provided to the ECC/PSC. Please do not respond through this encounter as the response is not directed to a shared pool.     Reason for Disposition   Patient wants to be seen    Protocols used: SORE THROAT-ADULT-OH

## 2022-04-26 DIAGNOSIS — I10 ESSENTIAL HYPERTENSION: ICD-10-CM

## 2022-04-26 RX ORDER — SPIRONOLACTONE 25 MG/1
TABLET ORAL
Qty: 30 TABLET | Refills: 3 | Status: SHIPPED | OUTPATIENT
Start: 2022-04-26 | End: 2022-05-11 | Stop reason: SDUPTHER

## 2022-05-05 DIAGNOSIS — G40.209 PARTIAL SYMPTOMATIC EPILEPSY WITH COMPLEX PARTIAL SEIZURES, NOT INTRACTABLE, WITHOUT STATUS EPILEPTICUS (HCC): ICD-10-CM

## 2022-05-05 DIAGNOSIS — E78.5 HYPERLIPIDEMIA LDL GOAL <70: ICD-10-CM

## 2022-05-05 DIAGNOSIS — E11.9 TYPE 2 DIABETES MELLITUS WITHOUT COMPLICATION, WITHOUT LONG-TERM CURRENT USE OF INSULIN (HCC): ICD-10-CM

## 2022-05-05 LAB
A/G RATIO: 1.4 (ref 1.1–2.2)
ALBUMIN SERPL-MCNC: 4.1 G/DL (ref 3.4–5)
ALP BLD-CCNC: 81 U/L (ref 40–129)
ALT SERPL-CCNC: 9 U/L (ref 10–40)
ANION GAP SERPL CALCULATED.3IONS-SCNC: 16 MMOL/L (ref 3–16)
AST SERPL-CCNC: 14 U/L (ref 15–37)
BASOPHILS ABSOLUTE: 0 K/UL (ref 0–0.2)
BASOPHILS RELATIVE PERCENT: 0.2 %
BILIRUB SERPL-MCNC: 0.4 MG/DL (ref 0–1)
BUN BLDV-MCNC: 17 MG/DL (ref 7–20)
CALCIUM SERPL-MCNC: 9.7 MG/DL (ref 8.3–10.6)
CHLORIDE BLD-SCNC: 107 MMOL/L (ref 99–110)
CHOLESTEROL, TOTAL: 148 MG/DL (ref 0–199)
CO2: 20 MMOL/L (ref 21–32)
CREAT SERPL-MCNC: 1 MG/DL (ref 0.6–1.2)
CREATININE URINE: 106.4 MG/DL (ref 28–259)
EOSINOPHILS ABSOLUTE: 0.2 K/UL (ref 0–0.6)
EOSINOPHILS RELATIVE PERCENT: 2.6 %
GFR AFRICAN AMERICAN: >60
GFR NON-AFRICAN AMERICAN: 54
GLUCOSE BLD-MCNC: 92 MG/DL (ref 70–99)
HCT VFR BLD CALC: 38.2 % (ref 36–48)
HDLC SERPL-MCNC: 50 MG/DL (ref 40–60)
HEMOGLOBIN: 12.5 G/DL (ref 12–16)
LDL CHOLESTEROL CALCULATED: 54 MG/DL
LYMPHOCYTES ABSOLUTE: 2.6 K/UL (ref 1–5.1)
LYMPHOCYTES RELATIVE PERCENT: 32.5 %
MCH RBC QN AUTO: 28.1 PG (ref 26–34)
MCHC RBC AUTO-ENTMCNC: 32.8 G/DL (ref 31–36)
MCV RBC AUTO: 85.5 FL (ref 80–100)
MICROALBUMIN UR-MCNC: 13.2 MG/DL
MICROALBUMIN/CREAT UR-RTO: 124.1 MG/G (ref 0–30)
MONOCYTES ABSOLUTE: 0.7 K/UL (ref 0–1.3)
MONOCYTES RELATIVE PERCENT: 8.1 %
NEUTROPHILS ABSOLUTE: 4.6 K/UL (ref 1.7–7.7)
NEUTROPHILS RELATIVE PERCENT: 56.6 %
PDW BLD-RTO: 13.9 % (ref 12.4–15.4)
PLATELET # BLD: 315 K/UL (ref 135–450)
PMV BLD AUTO: 8.3 FL (ref 5–10.5)
POTASSIUM SERPL-SCNC: 4.5 MMOL/L (ref 3.5–5.1)
RBC # BLD: 4.47 M/UL (ref 4–5.2)
SODIUM BLD-SCNC: 143 MMOL/L (ref 136–145)
TOTAL PROTEIN: 7.1 G/DL (ref 6.4–8.2)
TRIGL SERPL-MCNC: 218 MG/DL (ref 0–150)
TSH REFLEX: 2.05 UIU/ML (ref 0.27–4.2)
VLDLC SERPL CALC-MCNC: 44 MG/DL
WBC # BLD: 8 K/UL (ref 4–11)

## 2022-05-06 LAB
ESTIMATED AVERAGE GLUCOSE: 134.1 MG/DL
HBA1C MFR BLD: 6.3 %

## 2022-05-07 LAB — LAMOTRIGINE LEVEL: 6 UG/ML (ref 3–15)

## 2022-05-11 ENCOUNTER — OFFICE VISIT (OUTPATIENT)
Dept: INTERNAL MEDICINE CLINIC | Age: 72
End: 2022-05-11
Payer: MEDICARE

## 2022-05-11 VITALS
BODY MASS INDEX: 29.7 KG/M2 | WEIGHT: 184 LBS | OXYGEN SATURATION: 97 % | HEART RATE: 60 BPM | SYSTOLIC BLOOD PRESSURE: 112 MMHG | DIASTOLIC BLOOD PRESSURE: 68 MMHG

## 2022-05-11 DIAGNOSIS — R90.82 WHITE MATTER DISEASE: ICD-10-CM

## 2022-05-11 DIAGNOSIS — E11.9 TYPE 2 DIABETES MELLITUS WITHOUT COMPLICATION, WITHOUT LONG-TERM CURRENT USE OF INSULIN (HCC): ICD-10-CM

## 2022-05-11 DIAGNOSIS — I10 ESSENTIAL HYPERTENSION: ICD-10-CM

## 2022-05-11 DIAGNOSIS — G40.909 SEIZURE DISORDER (HCC): ICD-10-CM

## 2022-05-11 DIAGNOSIS — R41.3 MEMORY LOSS OR IMPAIRMENT: Primary | ICD-10-CM

## 2022-05-11 DIAGNOSIS — E78.5 HYPERLIPIDEMIA LDL GOAL <70: ICD-10-CM

## 2022-05-11 PROCEDURE — G8427 DOCREV CUR MEDS BY ELIG CLIN: HCPCS | Performed by: INTERNAL MEDICINE

## 2022-05-11 PROCEDURE — 1090F PRES/ABSN URINE INCON ASSESS: CPT | Performed by: INTERNAL MEDICINE

## 2022-05-11 PROCEDURE — 1036F TOBACCO NON-USER: CPT | Performed by: INTERNAL MEDICINE

## 2022-05-11 PROCEDURE — G8400 PT W/DXA NO RESULTS DOC: HCPCS | Performed by: INTERNAL MEDICINE

## 2022-05-11 PROCEDURE — 1123F ACP DISCUSS/DSCN MKR DOCD: CPT | Performed by: INTERNAL MEDICINE

## 2022-05-11 PROCEDURE — 3044F HG A1C LEVEL LT 7.0%: CPT | Performed by: INTERNAL MEDICINE

## 2022-05-11 PROCEDURE — G8417 CALC BMI ABV UP PARAM F/U: HCPCS | Performed by: INTERNAL MEDICINE

## 2022-05-11 PROCEDURE — 99214 OFFICE O/P EST MOD 30 MIN: CPT | Performed by: INTERNAL MEDICINE

## 2022-05-11 PROCEDURE — 2022F DILAT RTA XM EVC RTNOPTHY: CPT | Performed by: INTERNAL MEDICINE

## 2022-05-11 PROCEDURE — 3017F COLORECTAL CA SCREEN DOC REV: CPT | Performed by: INTERNAL MEDICINE

## 2022-05-11 RX ORDER — OMEPRAZOLE 20 MG/1
20 CAPSULE, DELAYED RELEASE ORAL 2 TIMES DAILY PRN
Qty: 180 CAPSULE | Refills: 3 | Status: SHIPPED | OUTPATIENT
Start: 2022-05-11

## 2022-05-11 RX ORDER — SPIRONOLACTONE 25 MG/1
25 TABLET ORAL DAILY
Qty: 90 TABLET | Refills: 3 | Status: SHIPPED | OUTPATIENT
Start: 2022-05-11 | End: 2022-07-29
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
## 2022-05-29 PROBLEM — R90.82 WHITE MATTER DISEASE: Status: ACTIVE | Noted: 2022-05-29

## 2022-05-29 NOTE — ASSESSMENT & PLAN NOTE
Remains on Lamictal 100 mg twice daily. Dr. Esther Ahumada felt seizures were related to hypertensive encephalopathy with white matter cerebrovascular disease.

## 2022-05-29 NOTE — ASSESSMENT & PLAN NOTE
Much improved with the addition of Aldactone 25 mg daily. Continue Micardis 80 mg daily, amlodipine 10 mg daily, metoprolol  mg twice daily, and Aldactone 25 mg daily. Kidney function has tolerated Aldactone without difficulty.

## 2022-05-29 NOTE — ASSESSMENT & PLAN NOTE
Controlled with hemoglobin A1c of 6.3%. Continue Jardiance 25 mg daily and metformin  mg twice daily.

## 2022-05-29 NOTE — ASSESSMENT & PLAN NOTE
Has been told that memory loss is related to white matter disease secondary to hypertensive encephalopathy. Referral to Dr. Richie Dietrich for second opinion with neurology.

## 2022-06-02 ENCOUNTER — TELEPHONE (OUTPATIENT)
Dept: INTERNAL MEDICINE CLINIC | Age: 72
End: 2022-06-02

## 2022-06-03 RX ORDER — ESCITALOPRAM OXALATE 20 MG/1
TABLET ORAL
Qty: 90 TABLET | Refills: 3 | Status: SHIPPED | OUTPATIENT
Start: 2022-06-03 | End: 2022-08-18 | Stop reason: SDUPTHER

## 2022-06-24 ENCOUNTER — TELEPHONE (OUTPATIENT)
Dept: INTERNAL MEDICINE CLINIC | Age: 72
End: 2022-06-24

## 2022-06-24 DIAGNOSIS — E78.5 HYPERLIPIDEMIA LDL GOAL <100: ICD-10-CM

## 2022-06-24 RX ORDER — ROSUVASTATIN CALCIUM 20 MG/1
20 TABLET, COATED ORAL DAILY
Qty: 90 TABLET | Refills: 3 | Status: SHIPPED | OUTPATIENT
Start: 2022-06-24

## 2022-06-24 RX ORDER — GABAPENTIN 300 MG/1
CAPSULE ORAL
Qty: 90 CAPSULE | Refills: 1 | Status: SHIPPED | OUTPATIENT
Start: 2022-06-24 | End: 2022-12-21

## 2022-06-24 NOTE — TELEPHONE ENCOUNTER
Recent Visits  Date Type Provider Dept   05/11/22 Office Visit Carey Manrique, DO [facility]   03/30/22 Office Visit Carey Manrique, DO [facility]   02/11/22 Office Visit Carey Manrique, DO [facility]   11/02/21 Office Visit Carey Manrique, DO [facility]   10/12/21 Office Visit Carey Manrique, DO [facility]   10/12/21 Office Visit Carey Manrique, DO [facility]   07/23/21 Office Visit Carey Manrique, DO [facility]   06/08/21 Office Visit Carey Manrique, DO [facility]   02/26/21 Office Visit Carey Manrique, DO [facility]   Showing recent visits within past 540 days with a meds authorizing provider and meeting all other requirements  Future Appointments  Date Type Provider Dept   08/16/22 Appointment Carey Manrique, DO [facility]   Showing future appointments within next 150 days with a meds authorizing provider and meeting all other requirements     5/11/2022

## 2022-07-07 ENCOUNTER — TELEPHONE (OUTPATIENT)
Dept: INTERNAL MEDICINE CLINIC | Age: 72
End: 2022-07-07

## 2022-07-07 NOTE — TELEPHONE ENCOUNTER
----- Message from Jose Reina sent at 7/7/2022 10:00 AM EDT -----  Subject: Message to Provider    QUESTIONS  Information for Provider? Pt would like to be seen sooner 8/9. She is   having an increase in back pain and would like to be seen sooner. ---------------------------------------------------------------------------  --------------  Guillermina CACERES  [phone]; OK to leave message on voicemail  ---------------------------------------------------------------------------  --------------  SCRIPT ANSWERS  Relationship to Patient?  Self

## 2022-07-07 NOTE — TELEPHONE ENCOUNTER
Dr Lakeisha Vargas is out of the office until the middle of July. Will give this to scheduling to pt in sooner with another provider in the practice.

## 2022-07-29 DIAGNOSIS — I10 ESSENTIAL HYPERTENSION: ICD-10-CM

## 2022-07-29 RX ORDER — SPIRONOLACTONE 25 MG/1
TABLET ORAL
Qty: 90 TABLET | Refills: 1 | Status: SHIPPED | OUTPATIENT
Start: 2022-07-29

## 2022-08-15 DIAGNOSIS — R56.9 NEW ONSET SEIZURE (HCC): ICD-10-CM

## 2022-08-15 RX ORDER — LAMOTRIGINE 100 MG/1
TABLET ORAL
Qty: 180 TABLET | Refills: 3 | Status: SHIPPED | OUTPATIENT
Start: 2022-08-15

## 2022-08-18 ENCOUNTER — OFFICE VISIT (OUTPATIENT)
Dept: INTERNAL MEDICINE CLINIC | Age: 72
End: 2022-08-18
Payer: MEDICARE

## 2022-08-18 VITALS
HEART RATE: 51 BPM | OXYGEN SATURATION: 98 % | SYSTOLIC BLOOD PRESSURE: 170 MMHG | BODY MASS INDEX: 30.83 KG/M2 | DIASTOLIC BLOOD PRESSURE: 78 MMHG | WEIGHT: 191 LBS

## 2022-08-18 DIAGNOSIS — M25.561 CHRONIC PAIN OF RIGHT KNEE: ICD-10-CM

## 2022-08-18 DIAGNOSIS — M54.12 CERVICAL RADICULOPATHY: ICD-10-CM

## 2022-08-18 DIAGNOSIS — G40.909 SEIZURE DISORDER (HCC): ICD-10-CM

## 2022-08-18 DIAGNOSIS — D64.9 MILD ANEMIA: ICD-10-CM

## 2022-08-18 DIAGNOSIS — E11.9 TYPE 2 DIABETES MELLITUS WITHOUT COMPLICATION, WITHOUT LONG-TERM CURRENT USE OF INSULIN (HCC): ICD-10-CM

## 2022-08-18 DIAGNOSIS — N18.32 STAGE 3B CHRONIC KIDNEY DISEASE (HCC): ICD-10-CM

## 2022-08-18 DIAGNOSIS — R91.1 PULMONARY NODULE: Primary | ICD-10-CM

## 2022-08-18 DIAGNOSIS — M25.511 ACUTE PAIN OF RIGHT SHOULDER: ICD-10-CM

## 2022-08-18 DIAGNOSIS — R90.82 WHITE MATTER DISEASE: ICD-10-CM

## 2022-08-18 DIAGNOSIS — I10 ESSENTIAL HYPERTENSION: ICD-10-CM

## 2022-08-18 DIAGNOSIS — E55.9 VITAMIN D DEFICIENCY: ICD-10-CM

## 2022-08-18 DIAGNOSIS — F32.A ANXIETY AND DEPRESSION: ICD-10-CM

## 2022-08-18 DIAGNOSIS — M51.36 DDD (DEGENERATIVE DISC DISEASE), LUMBAR: ICD-10-CM

## 2022-08-18 DIAGNOSIS — E78.5 HYPERLIPIDEMIA LDL GOAL <70: ICD-10-CM

## 2022-08-18 DIAGNOSIS — R41.3 MEMORY LOSS OR IMPAIRMENT: ICD-10-CM

## 2022-08-18 DIAGNOSIS — F41.9 ANXIETY AND DEPRESSION: ICD-10-CM

## 2022-08-18 DIAGNOSIS — I48.0 PAROXYSMAL ATRIAL FIBRILLATION (HCC): ICD-10-CM

## 2022-08-18 DIAGNOSIS — G89.29 CHRONIC PAIN OF RIGHT KNEE: ICD-10-CM

## 2022-08-18 LAB
A/G RATIO: 1.7 (ref 1.1–2.2)
ALBUMIN SERPL-MCNC: 4.5 G/DL (ref 3.4–5)
ALP BLD-CCNC: 74 U/L (ref 40–129)
ALT SERPL-CCNC: 26 U/L (ref 10–40)
ANION GAP SERPL CALCULATED.3IONS-SCNC: 14 MMOL/L (ref 3–16)
AST SERPL-CCNC: 18 U/L (ref 15–37)
BASOPHILS ABSOLUTE: 0.1 K/UL (ref 0–0.2)
BASOPHILS RELATIVE PERCENT: 1 %
BILIRUB SERPL-MCNC: 0.3 MG/DL (ref 0–1)
BUN BLDV-MCNC: 21 MG/DL (ref 7–20)
CALCIUM SERPL-MCNC: 10.4 MG/DL (ref 8.3–10.6)
CHLORIDE BLD-SCNC: 104 MMOL/L (ref 99–110)
CHOLESTEROL, TOTAL: 177 MG/DL (ref 0–199)
CO2: 25 MMOL/L (ref 21–32)
CREAT SERPL-MCNC: 1.3 MG/DL (ref 0.6–1.2)
CREATININE URINE: 43.9 MG/DL (ref 28–259)
EOSINOPHILS ABSOLUTE: 0.1 K/UL (ref 0–0.6)
EOSINOPHILS RELATIVE PERCENT: 1.1 %
GFR AFRICAN AMERICAN: 49
GFR NON-AFRICAN AMERICAN: 40
GLUCOSE BLD-MCNC: 119 MG/DL (ref 70–99)
HBA1C MFR BLD: 6 %
HCT VFR BLD CALC: 38.1 % (ref 36–48)
HDLC SERPL-MCNC: 66 MG/DL (ref 40–60)
HEMOGLOBIN: 12.5 G/DL (ref 12–16)
LDL CHOLESTEROL CALCULATED: 73 MG/DL
LYMPHOCYTES ABSOLUTE: 1.7 K/UL (ref 1–5.1)
LYMPHOCYTES RELATIVE PERCENT: 16.8 %
MCH RBC QN AUTO: 29.2 PG (ref 26–34)
MCHC RBC AUTO-ENTMCNC: 32.7 G/DL (ref 31–36)
MCV RBC AUTO: 89.2 FL (ref 80–100)
MICROALBUMIN UR-MCNC: 5.4 MG/DL
MICROALBUMIN/CREAT UR-RTO: 123 MG/G (ref 0–30)
MONOCYTES ABSOLUTE: 0.6 K/UL (ref 0–1.3)
MONOCYTES RELATIVE PERCENT: 5.6 %
NEUTROPHILS ABSOLUTE: 7.6 K/UL (ref 1.7–7.7)
NEUTROPHILS RELATIVE PERCENT: 75.5 %
PDW BLD-RTO: 15.3 % (ref 12.4–15.4)
PLATELET # BLD: 284 K/UL (ref 135–450)
PMV BLD AUTO: 8.5 FL (ref 5–10.5)
POTASSIUM SERPL-SCNC: 5 MMOL/L (ref 3.5–5.1)
RBC # BLD: 4.27 M/UL (ref 4–5.2)
SODIUM BLD-SCNC: 143 MMOL/L (ref 136–145)
TOTAL PROTEIN: 7.1 G/DL (ref 6.4–8.2)
TRIGL SERPL-MCNC: 189 MG/DL (ref 0–150)
VLDLC SERPL CALC-MCNC: 38 MG/DL
WBC # BLD: 10.1 K/UL (ref 4–11)

## 2022-08-18 PROCEDURE — 99214 OFFICE O/P EST MOD 30 MIN: CPT | Performed by: INTERNAL MEDICINE

## 2022-08-18 PROCEDURE — G8427 DOCREV CUR MEDS BY ELIG CLIN: HCPCS | Performed by: INTERNAL MEDICINE

## 2022-08-18 PROCEDURE — 83036 HEMOGLOBIN GLYCOSYLATED A1C: CPT | Performed by: INTERNAL MEDICINE

## 2022-08-18 PROCEDURE — 3044F HG A1C LEVEL LT 7.0%: CPT | Performed by: INTERNAL MEDICINE

## 2022-08-18 PROCEDURE — G8400 PT W/DXA NO RESULTS DOC: HCPCS | Performed by: INTERNAL MEDICINE

## 2022-08-18 PROCEDURE — 1036F TOBACCO NON-USER: CPT | Performed by: INTERNAL MEDICINE

## 2022-08-18 PROCEDURE — 1090F PRES/ABSN URINE INCON ASSESS: CPT | Performed by: INTERNAL MEDICINE

## 2022-08-18 PROCEDURE — 1123F ACP DISCUSS/DSCN MKR DOCD: CPT | Performed by: INTERNAL MEDICINE

## 2022-08-18 PROCEDURE — 2022F DILAT RTA XM EVC RTNOPTHY: CPT | Performed by: INTERNAL MEDICINE

## 2022-08-18 PROCEDURE — 3017F COLORECTAL CA SCREEN DOC REV: CPT | Performed by: INTERNAL MEDICINE

## 2022-08-18 PROCEDURE — G8417 CALC BMI ABV UP PARAM F/U: HCPCS | Performed by: INTERNAL MEDICINE

## 2022-08-18 RX ORDER — HYDROCODONE BITARTRATE AND ACETAMINOPHEN 5; 325 MG/1; MG/1
.5-1 TABLET ORAL EVERY 6 HOURS PRN
Qty: 30 TABLET | Refills: 0 | Status: SHIPPED | OUTPATIENT
Start: 2022-08-18 | End: 2022-08-28

## 2022-08-18 RX ORDER — DIAPER,BRIEF,INFANT-TODD,DISP
EACH MISCELLANEOUS 2 TIMES DAILY
COMMUNITY
End: 2022-11-04 | Stop reason: ALTCHOICE

## 2022-08-18 RX ORDER — ESCITALOPRAM OXALATE 20 MG/1
30 TABLET ORAL DAILY
Qty: 135 TABLET | Refills: 3 | Status: SHIPPED | OUTPATIENT
Start: 2022-08-18 | End: 2022-11-04 | Stop reason: ALTCHOICE

## 2022-08-18 ASSESSMENT — PATIENT HEALTH QUESTIONNAIRE - PHQ9
SUM OF ALL RESPONSES TO PHQ QUESTIONS 1-9: 11
9. THOUGHTS THAT YOU WOULD BE BETTER OFF DEAD, OR OF HURTING YOURSELF: 0
7. TROUBLE CONCENTRATING ON THINGS, SUCH AS READING THE NEWSPAPER OR WATCHING TELEVISION: 1
1. LITTLE INTEREST OR PLEASURE IN DOING THINGS: 1
3. TROUBLE FALLING OR STAYING ASLEEP: 2
10. IF YOU CHECKED OFF ANY PROBLEMS, HOW DIFFICULT HAVE THESE PROBLEMS MADE IT FOR YOU TO DO YOUR WORK, TAKE CARE OF THINGS AT HOME, OR GET ALONG WITH OTHER PEOPLE: 1
6. FEELING BAD ABOUT YOURSELF - OR THAT YOU ARE A FAILURE OR HAVE LET YOURSELF OR YOUR FAMILY DOWN: 1
8. MOVING OR SPEAKING SO SLOWLY THAT OTHER PEOPLE COULD HAVE NOTICED. OR THE OPPOSITE, BEING SO FIGETY OR RESTLESS THAT YOU HAVE BEEN MOVING AROUND A LOT MORE THAN USUAL: 0
SUM OF ALL RESPONSES TO PHQ QUESTIONS 1-9: 11
4. FEELING TIRED OR HAVING LITTLE ENERGY: 3
5. POOR APPETITE OR OVEREATING: 1
2. FEELING DOWN, DEPRESSED OR HOPELESS: 2
SUM OF ALL RESPONSES TO PHQ9 QUESTIONS 1 & 2: 3

## 2022-08-18 NOTE — PROGRESS NOTES
Patient: Frederic Mason is a 67 y.o. female who presents today with the following Chief Complaint(s):  Chief Complaint   Patient presents with    Diabetes    Hypertension       HPI    Here today for follow up. HTN- BP up, wondering if related to increased stress as son relapsed with substance abuse and is currently in alf. Other than today, BP was \"good\" about 6 weeks ago when her daughter (who is an RN) checked it. Does not feel bad with her BP being up. Depression - worse currently. Only wants to sleep. Upset because of her son. Has pain. Does not see psychology and does not want to at this time. Did see neuropsychology for memory testing- referred by Dr. Lorie Jacobs. Done through [address] [place] [address] [address] Neurology. Did see Dr. Lorie Jacobs at [facility] [address] Neurology for seizures. Continue Lamictal. No follow up that she is aware of. DM- no issues with blood sugars. Having pain in her right arm and shoulder. Did go to urgent care and given muscle relaxer. Is really sore at night, uses ice and heat, has limited ROM. No weakness. Started about 2.5 weeks ago. Wondering about taking pain medication. Has always been very reluctant. Pain is interfering with her mood. Has pain in her back and neck. Has had low back surgery. Has spinal stimulator. Does not help with her pain despite adjusting level of stimulator. Would like to see Dr. Lalito Fields with [facility]. Has knee pain and trouble walking (right knee).      Allergies   Allergen Reactions    Compazine [Prochlorperazine Maleate] Shortness Of Breath     dyspnea    Morphine Nausea And Vomiting     N&V      Past Medical History:   Diagnosis Date    Back pain, chronic     Depression     Joint pain     Sleep apnea       Past Surgical History:   Procedure Laterality Date    LUMBAR FUSION  2016    PARTIAL HYSTERECTOMY (CERVIX NOT REMOVED)  2002    SPINAL CORD STIMULATOR SURGERY      Transmit receive head coil only      Social History     Socioeconomic History    Marital status:  Spouse name: Not on file    Number of children: Not on file    Years of education: Not on file    Highest education level: Not on file   Occupational History    Occupation: retired   Tobacco Use    Smoking status: Never    Smokeless tobacco: Never   Vaping Use    Vaping Use: Never used   Substance and Sexual Activity    Alcohol use: No    Drug use: No    Sexual activity: Not on file   Other Topics Concern    Not on file   Social History Narrative    Not on file     Social Determinants of Health     Financial Resource Strain: Low Risk     Difficulty of Paying Living Expenses: Not hard at all   Food Insecurity: No Food Insecurity    Worried About Running Out of Food in the Last Year: Never true    Ran Out of Food in the Last Year: Never true   Transportation Needs: Not on file   Physical Activity: Not on file   Stress: Not on file   Social Connections: Not on file   Intimate Partner Violence: Not on file   Housing Stability: Not on file     Family History   Problem Relation Age of Onset    Heart Failure Mother     COPD Father         Outpatient Medications Prior to Visit   Medication Sig Dispense Refill    bacitracin zinc 500 UNIT/GM ointment Apply topically 2 times daily Apply topically 2 times daily.       lamoTRIgine (LAMICTAL) 100 MG tablet TAKE 1 TABLET TWICE A  tablet 3    spironolactone (ALDACTONE) 25 MG tablet TAKE 1 TABLET BY MOUTH EVERY DAY 90 tablet 1    gabapentin (NEURONTIN) 300 MG capsule TAKE 1 CAPSULE NIGHTLY 90 capsule 1    rosuvastatin (CRESTOR) 20 MG tablet Take 1 tablet by mouth daily 90 tablet 3    omeprazole (PRILOSEC) 20 MG delayed release capsule Take 1 capsule by mouth 2 times daily as needed (nauease and GI upset) = 180 capsule 3    metoprolol succinate (TOPROL XL) 100 MG extended release tablet TAKE 1 TABLET TWICE A  tablet 3    telmisartan (MICARDIS) 80 MG tablet TAKE 1 TABLET DAILY 90 tablet 1    metFORMIN (GLUCOPHAGE-XR) 500 MG extended release tablet TAKE 1 TABLET TWICE A DAY WITH MEALS 180 tablet 3    JARDIANCE 25 MG tablet Take 25 mg by mouth daily 90 tablet 3    amLODIPine (NORVASC) 10 MG tablet Take 1 tablet by mouth daily 90 tablet 3    vitamin D (ERGOCALCIFEROL) 1.25 MG (53395 UT) CAPS capsule TAKE 1 CAPSULE ONCE A WEEK 12 capsule 3    predniSONE (DELTASONE) 5 MG tablet TAKE 1 TABLET DAILY 90 tablet 3    aspirin EC 81 MG EC tablet Take 81 mg by mouth daily      blood glucose monitor kit and supplies Check sugars qd and prn 1 kit 0    blood glucose monitor strips Test 1 times a day & as needed for symptoms of irregular blood glucose. 100 strip 3    Lancets MISC 1 each by Does not apply route daily 100 each 5    niacin 500 MG extended release capsule Take 500 mg by mouth nightly      CPAP Machine MISC by Does not apply route      escitalopram (LEXAPRO) 20 MG tablet TAKE 1 TABLET DAILY 90 tablet 3    Magnesium Hydroxide (MAGNESIA PO) Take 50 mg by mouth daily  (Patient not taking: Reported on 8/18/2022)       No facility-administered medications prior to visit. Patient'spast medical history, surgical history, family history, medications,  and allergies  were all reviewed and updated as appropriate today. Review of Systems   Constitutional:  Negative for appetite change, fatigue and fever. Respiratory:  Negative for chest tightness and shortness of breath. Cardiovascular:  Negative for chest pain. Gastrointestinal:  Negative for constipation and diarrhea. Skin:  Negative for rash. BP (!) 170/78   Pulse 51   Wt 191 lb (86.6 kg)   SpO2 98%   BMI 30.83 kg/m²   Physical Exam  Vitals and nursing note reviewed. Constitutional:       Appearance: She is well-developed. She is not toxic-appearing. HENT:      Head: Normocephalic. Right Ear: Tympanic membrane, ear canal and external ear normal.      Left Ear: Tympanic membrane, ear canal and external ear normal.      Mouth/Throat:      Pharynx: No oropharyngeal exudate or posterior oropharyngeal erythema. Eyes:      General: No scleral icterus. Extraocular Movements: Extraocular movements intact. Conjunctiva/sclera: Conjunctivae normal.      Pupils: Pupils are equal, round, and reactive to light. Neck:      Thyroid: No thyroid mass or thyromegaly. Vascular: No carotid bruit. Cardiovascular:      Rate and Rhythm: Normal rate and regular rhythm. Heart sounds: Normal heart sounds. No murmur heard. Pulmonary:      Effort: Pulmonary effort is normal.      Breath sounds: Normal breath sounds. Musculoskeletal:      Right lower leg: No edema. Left lower leg: No edema. Lymphadenopathy:      Cervical: No cervical adenopathy. Neurological:      General: No focal deficit present. Mental Status: She is alert and oriented to person, place, and time. Psychiatric:         Mood and Affect: Mood normal.         Behavior: Behavior normal. Behavior is cooperative. ASSESSMENT/PLAN:    Problem List Items Addressed This Visit       Acute pain of right shoulder     Referral placed to physical therapy. Relevant Orders    External Referral To Orthopedic Surgery    Anxiety and depression    Relevant Medications    escitalopram (LEXAPRO) 20 MG tablet    Atrial fibrillation (HCC)     Single episode possibly in October 2021. Did see Dr. Micha Roberts who checked her echocardiogram and an event monitor without evidence of A. fib. Cervical radiculopathy     Pain is significantly hampering patient's quality of life and affecting her overall mood. Patient is unable to take NSAIDs due to chronic kidney disease. She is on prednisone 5 mg daily which is not ideal as she also has diabetes. She is agreeable to trial of pain medicine-did not tolerate Butrans. Trial of Norco 5/325 mg 1/2-1 3 times daily as needed for pain.          Relevant Medications    escitalopram (LEXAPRO) 20 MG tablet    HYDROcodone-acetaminophen (NORCO) 5-325 MG per tablet    Chronic pain of right knee     Worse currently, related to pain as well as increased stress surrounding her son's recent relapse. Increase Lexapro to 30 mg daily. Relevant Medications    HYDROcodone-acetaminophen (NORCO) 5-325 MG per tablet    Other Relevant Orders    External Referral To Orthopedic Surgery    DDD (degenerative disc disease), lumbar     Pain is significantly hampering patient's quality of life and affecting her overall mood. Patient is unable to take NSAIDs due to chronic kidney disease. She is on prednisone 5 mg daily which is not ideal as she also has diabetes. She is agreeable to trial of pain medicine-did not tolerate Butrans. Trial of Norco 5/325 mg 1/2-1 3 times daily as needed for pain. Relevant Medications    HYDROcodone-acetaminophen (NORCO) 5-325 MG per tablet    Essential hypertension     Typically well controlled but elevated today. Possibly related to pain as well as recent increase in stress surrounding her son's relapse into substance abuse. Continue Micardis 80 mg daily, amlodipine 10 mg daily, metoprolol  mg twice daily, and Aldactone 25 mg daily. Hyperlipidemia LDL goal <70      Continue Crestor 10 mg daily. Memory loss or impairment     Patient did undergo neuropsych testing at [facility]. I do not have results to review. We will need to reach out to [facility] for a copy of the report. Mild anemia     Check CBC. Relevant Orders    CBC with Auto Differential (Completed)    CBC with Auto Differential    Pulmonary nodule - Primary    Relevant Orders    CT CHEST WO CONTRAST    Seizure disorder ([address]     Remains on Lamictal.  Seizure-free. Stage 3b chronic kidney disease ([address]     Secondary to hypertension and NSAID use.          Relevant Orders    Basic Metabolic Panel    Comprehensive Metabolic Panel (Completed)    Type 2 diabetes mellitus without complication, without long-term current use of insulin (HCC)      Continue metformin ER 1000 mg daily and Jardiance 25 mg daily. Check hemoglobin A1c. Relevant Orders    POCT glycosylated hemoglobin (Hb A1C) (Completed)     DIABETES FOOT EXAM (Completed)    Comprehensive Metabolic Panel (Completed)    Lipid Panel (Completed)    Microalbumin / Creatinine Urine Ratio (Completed)    Hemoglobin A1C (Completed)    Vitamin D deficiency      Continue weekly ergocalciferol. White matter disease      Patient did get a second opinion from Dr. Reno Oliveira at [facility]. She did undergo neuropsych testing for memory, and the results that I am not able to review. Current Outpatient Medications   Medication Sig Dispense Refill    bacitracin zinc 500 UNIT/GM ointment Apply topically 2 times daily Apply topically 2 times daily. escitalopram (LEXAPRO) 20 MG tablet Take 1.5 tablets by mouth daily 135 tablet 3    HYDROcodone-acetaminophen (NORCO) 5-325 MG per tablet Take 0.5-1 tablets by mouth every 6 hours as needed for Pain for up to 10 days. Intended supply: 10 days.  Take lowest dose possible to manage pain 30 tablet 0    lamoTRIgine (LAMICTAL) 100 MG tablet TAKE 1 TABLET TWICE A  tablet 3    spironolactone (ALDACTONE) 25 MG tablet TAKE 1 TABLET BY MOUTH EVERY DAY 90 tablet 1    gabapentin (NEURONTIN) 300 MG capsule TAKE 1 CAPSULE NIGHTLY 90 capsule 1    rosuvastatin (CRESTOR) 20 MG tablet Take 1 tablet by mouth daily 90 tablet 3    omeprazole (PRILOSEC) 20 MG delayed release capsule Take 1 capsule by mouth 2 times daily as needed (nauease and GI upset) = 180 capsule 3    metoprolol succinate (TOPROL XL) 100 MG extended release tablet TAKE 1 TABLET TWICE A  tablet 3    telmisartan (MICARDIS) 80 MG tablet TAKE 1 TABLET DAILY 90 tablet 1    metFORMIN (GLUCOPHAGE-XR) 500 MG extended release tablet TAKE 1 TABLET TWICE A DAY WITH MEALS 180 tablet 3    JARDIANCE 25 MG tablet Take 25 mg by mouth daily 90 tablet 3    amLODIPine (NORVASC) 10 MG tablet Take 1 tablet by mouth daily 90 tablet 3    vitamin D (ERGOCALCIFEROL) 1.25 MG (39936 UT) CAPS capsule TAKE 1 CAPSULE ONCE A WEEK 12 capsule 3    predniSONE (DELTASONE) 5 MG tablet TAKE 1 TABLET DAILY 90 tablet 3    aspirin EC 81 MG EC tablet Take 81 mg by mouth daily      blood glucose monitor kit and supplies Check sugars qd and prn 1 kit 0    blood glucose monitor strips Test 1 times a day & as needed for symptoms of irregular blood glucose. 100 strip 3    Lancets MISC 1 each by Does not apply route daily 100 each 5    niacin 500 MG extended release capsule Take 500 mg by mouth nightly      CPAP Machine MISC by Does not apply route       No current facility-administered medications for this visit. Return in about 6 weeks (around 9/29/2022) for depression, blood pressure, pain.

## 2022-08-19 LAB
ESTIMATED AVERAGE GLUCOSE: 128.4 MG/DL
HBA1C MFR BLD: 6.1 %

## 2022-08-23 ENCOUNTER — HOSPITAL ENCOUNTER (OUTPATIENT)
Dept: ULTRASOUND IMAGING | Age: 72
Discharge: HOME | End: 2022-08-23
Payer: MEDICARE

## 2022-08-23 DIAGNOSIS — E04.1 THYROID NODULE: ICD-10-CM

## 2022-08-23 PROCEDURE — 76536 US EXAM OF HEAD AND NECK: CPT

## 2022-08-28 PROBLEM — M25.511 ACUTE PAIN OF RIGHT SHOULDER: Status: ACTIVE | Noted: 2022-08-28

## 2022-08-28 PROBLEM — D64.9 MILD ANEMIA: Status: ACTIVE | Noted: 2022-08-28

## 2022-08-28 PROBLEM — N18.32 STAGE 3B CHRONIC KIDNEY DISEASE (HCC): Status: ACTIVE | Noted: 2022-08-28

## 2022-08-28 PROBLEM — G89.29 CHRONIC PAIN OF RIGHT KNEE: Status: ACTIVE | Noted: 2022-08-28

## 2022-08-28 PROBLEM — M25.561 CHRONIC PAIN OF RIGHT KNEE: Status: ACTIVE | Noted: 2022-08-28

## 2022-08-28 ASSESSMENT — ENCOUNTER SYMPTOMS
DIARRHEA: 0
CONSTIPATION: 0
SHORTNESS OF BREATH: 0
CHEST TIGHTNESS: 0

## 2022-08-29 NOTE — ASSESSMENT & PLAN NOTE
Patient did undergo neuropsych testing at [facility]. I do not have results to review. We will need to reach out to [facility] for a copy of the report.

## 2022-08-29 NOTE — ASSESSMENT & PLAN NOTE
Worse currently, related to pain as well as increased stress surrounding her son's recent relapse. Increase Lexapro to 30 mg daily.

## 2022-08-29 NOTE — ASSESSMENT & PLAN NOTE
Patient did get a second opinion from Dr. Jamel Castillo at [facility]. She did undergo neuropsych testing for memory, and the results that I am not able to review.

## 2022-08-29 NOTE — ASSESSMENT & PLAN NOTE
Pain is significantly hampering patient's quality of life and affecting her overall mood. Patient is unable to take NSAIDs due to chronic kidney disease. She is on prednisone 5 mg daily which is not ideal as she also has diabetes. She is agreeable to trial of pain medicine-did not tolerate Butrans. Trial of Norco 5/325 mg 1/2-1 3 times daily as needed for pain.

## 2022-08-29 NOTE — ASSESSMENT & PLAN NOTE
Typically well controlled but elevated today. Possibly related to pain as well as recent increase in stress surrounding her son's relapse into substance abuse. Continue Micardis 80 mg daily, amlodipine 10 mg daily, metoprolol  mg twice daily, and Aldactone 25 mg daily.

## 2022-08-29 NOTE — ASSESSMENT & PLAN NOTE
Single episode possibly in October 2021. Did see Dr. Benji Velasquez who checked her echocardiogram and an event monitor without evidence of A. fib.

## 2022-09-06 ENCOUNTER — OFFICE VISIT (OUTPATIENT)
Dept: ENT CLINIC | Age: 72
End: 2022-09-06
Payer: MEDICARE

## 2022-09-06 VITALS
SYSTOLIC BLOOD PRESSURE: 139 MMHG | HEART RATE: 94 BPM | DIASTOLIC BLOOD PRESSURE: 72 MMHG | BODY MASS INDEX: 30.18 KG/M2 | WEIGHT: 187 LBS

## 2022-09-06 DIAGNOSIS — E04.1 THYROID NODULE: Primary | ICD-10-CM

## 2022-09-06 DIAGNOSIS — L98.9 FACIAL SKIN LESION: ICD-10-CM

## 2022-09-06 DIAGNOSIS — R56.9 SEIZURES (HCC): ICD-10-CM

## 2022-09-06 PROCEDURE — 1123F ACP DISCUSS/DSCN MKR DOCD: CPT | Performed by: STUDENT IN AN ORGANIZED HEALTH CARE EDUCATION/TRAINING PROGRAM

## 2022-09-06 PROCEDURE — G8427 DOCREV CUR MEDS BY ELIG CLIN: HCPCS | Performed by: STUDENT IN AN ORGANIZED HEALTH CARE EDUCATION/TRAINING PROGRAM

## 2022-09-06 PROCEDURE — 99214 OFFICE O/P EST MOD 30 MIN: CPT | Performed by: STUDENT IN AN ORGANIZED HEALTH CARE EDUCATION/TRAINING PROGRAM

## 2022-09-06 PROCEDURE — 3017F COLORECTAL CA SCREEN DOC REV: CPT | Performed by: STUDENT IN AN ORGANIZED HEALTH CARE EDUCATION/TRAINING PROGRAM

## 2022-09-06 PROCEDURE — 1090F PRES/ABSN URINE INCON ASSESS: CPT | Performed by: STUDENT IN AN ORGANIZED HEALTH CARE EDUCATION/TRAINING PROGRAM

## 2022-09-06 PROCEDURE — G8417 CALC BMI ABV UP PARAM F/U: HCPCS | Performed by: STUDENT IN AN ORGANIZED HEALTH CARE EDUCATION/TRAINING PROGRAM

## 2022-09-06 PROCEDURE — 1036F TOBACCO NON-USER: CPT | Performed by: STUDENT IN AN ORGANIZED HEALTH CARE EDUCATION/TRAINING PROGRAM

## 2022-09-06 PROCEDURE — G8400 PT W/DXA NO RESULTS DOC: HCPCS | Performed by: STUDENT IN AN ORGANIZED HEALTH CARE EDUCATION/TRAINING PROGRAM

## 2022-09-06 NOTE — PROGRESS NOTES
has no issues related to dysphagia or dysphonia. She has no family history of thyroid cancer or radiation of the head and neck. 9/6/2022:    Patient presents today for follow-up of her thyroid nodule. She had an updated thyroid ultrasound which showed no interval growth. She denies new symptoms. Her seizures have essentially resolved. She notes no changes in her facial skin lesions. She is now having some new issues related to back pain and right shoulder pain. REVIEW OF SYSTEMS  The following systems were reviewed and revealed the following in addition to any already discussed in the HPI:    PHYSICAL EXAM    GENERAL: No acute distress, alert and oriented, no hoarseness, strong voice  EYES: EOMI, Anti-icteric  HENT:   Head: Normocephalic and atraumatic. Face:  Symmetric, facial nerve intact  Right Ear: Normal external ear, normal external auditory canal, intact tympanic membrane with normal mobility and aerated middle ear  Left Ear: Normal external ear, normal external auditory canal, intact tympanic membrane with normal mobility and aerated middle ear  Mouth/Oral Cavity:  normal lips, Uvula is midline, no mucosal lesions,  Nose:Normal external nasal appearance. No evidence of nasal lesions  NECK: Normal range of motion, no thyromegaly, trachea is midline, no lymphadenopathy, no neck masses, no crepitus, excessive skin overlying the area of the thyroid. Several raised 1 mm keratinaceous lesions of the neck and forehead. Unchanged from prior    PROCEDURE      This note was generated completely or in part utilizing Dragon dictation speech recognition software. Occasionally, words are mistranscribed and despite editing, the text may contain inaccuracies due to incorrect word recognition. If further clarification is needed please contact the office at [phone]. An electronic signature was used to authenticate this note.     --Michael Rivas MD

## 2022-09-19 ENCOUNTER — HOSPITAL ENCOUNTER (EMERGENCY)
Age: 72
Discharge: HOME | End: 2022-09-19
Attending: EMERGENCY MEDICINE
Payer: MEDICARE

## 2022-09-19 ENCOUNTER — APPOINTMENT (OUTPATIENT)
Dept: CT IMAGING | Age: 72
End: 2022-09-19
Payer: MEDICARE

## 2022-09-19 VITALS
SYSTOLIC BLOOD PRESSURE: 150 MMHG | TEMPERATURE: 97.9 F | DIASTOLIC BLOOD PRESSURE: 53 MMHG | WEIGHT: 186.29 LBS | BODY MASS INDEX: 30.07 KG/M2 | RESPIRATION RATE: 18 BRPM | OXYGEN SATURATION: 94 % | HEART RATE: 61 BPM

## 2022-09-19 DIAGNOSIS — N30.00 ACUTE CYSTITIS WITHOUT HEMATURIA: ICD-10-CM

## 2022-09-19 DIAGNOSIS — E86.0 DEHYDRATION: Primary | ICD-10-CM

## 2022-09-19 LAB
A/G RATIO: 1.3 (ref 1.1–2.2)
ALBUMIN SERPL-MCNC: 4.3 G/DL (ref 3.4–5)
ALP BLD-CCNC: 72 U/L (ref 40–129)
ALT SERPL-CCNC: 23 U/L (ref 10–40)
ANION GAP SERPL CALCULATED.3IONS-SCNC: 11 MMOL/L (ref 3–16)
AST SERPL-CCNC: 17 U/L (ref 15–37)
BACTERIA: ABNORMAL /HPF
BASOPHILS ABSOLUTE: 0 K/UL (ref 0–0.2)
BASOPHILS RELATIVE PERCENT: 0.3 %
BILIRUB SERPL-MCNC: 0.4 MG/DL (ref 0–1)
BILIRUBIN URINE: NEGATIVE
BLOOD, URINE: NEGATIVE
BUN BLDV-MCNC: 45 MG/DL (ref 7–20)
CALCIUM SERPL-MCNC: 10.5 MG/DL (ref 8.3–10.6)
CHLORIDE BLD-SCNC: 101 MMOL/L (ref 99–110)
CLARITY: ABNORMAL
CO2: 23 MMOL/L (ref 21–32)
COLOR: YELLOW
COMMENT UA: ABNORMAL
CREAT SERPL-MCNC: 1.6 MG/DL (ref 0.6–1.2)
EKG ATRIAL RATE: 46 BPM
EKG DIAGNOSIS: NORMAL
EKG P AXIS: 74 DEGREES
EKG P-R INTERVAL: 148 MS
EKG Q-T INTERVAL: 450 MS
EKG QRS DURATION: 98 MS
EKG QTC CALCULATION (BAZETT): 393 MS
EKG R AXIS: 10 DEGREES
EKG T AXIS: 57 DEGREES
EKG VENTRICULAR RATE: 46 BPM
EOSINOPHILS ABSOLUTE: 0.1 K/UL (ref 0–0.6)
EOSINOPHILS RELATIVE PERCENT: 0.9 %
EPITHELIAL CELLS, UA: 45 /HPF (ref 0–5)
GFR AFRICAN AMERICAN: 38
GFR NON-AFRICAN AMERICAN: 32
GLUCOSE BLD-MCNC: 112 MG/DL (ref 70–99)
GLUCOSE URINE: 250 MG/DL
HCT VFR BLD CALC: 38.9 % (ref 36–48)
HEMOGLOBIN: 12.6 G/DL (ref 12–16)
HYALINE CASTS: 18 /LPF (ref 0–8)
KETONES, URINE: ABNORMAL MG/DL
LEUKOCYTE ESTERASE, URINE: ABNORMAL
LYMPHOCYTES ABSOLUTE: 1.8 K/UL (ref 1–5.1)
LYMPHOCYTES RELATIVE PERCENT: 15.2 %
MCH RBC QN AUTO: 28.9 PG (ref 26–34)
MCHC RBC AUTO-ENTMCNC: 32.3 G/DL (ref 31–36)
MCV RBC AUTO: 89.6 FL (ref 80–100)
MICROSCOPIC EXAMINATION: YES
MONOCYTES ABSOLUTE: 1.2 K/UL (ref 0–1.3)
MONOCYTES RELATIVE PERCENT: 10.7 %
NEUTROPHILS ABSOLUTE: 8.5 K/UL (ref 1.7–7.7)
NEUTROPHILS RELATIVE PERCENT: 72.9 %
NITRITE, URINE: NEGATIVE
PDW BLD-RTO: 13.5 % (ref 12.4–15.4)
PH UA: 5 (ref 5–8)
PLATELET # BLD: 277 K/UL (ref 135–450)
PMV BLD AUTO: 8 FL (ref 5–10.5)
POTASSIUM REFLEX MAGNESIUM: 4.6 MMOL/L (ref 3.5–5.1)
PROTEIN UA: 30 MG/DL
RBC # BLD: 4.34 M/UL (ref 4–5.2)
RBC UA: ABNORMAL /HPF (ref 0–4)
SODIUM BLD-SCNC: 135 MMOL/L (ref 136–145)
SPECIFIC GRAVITY UA: 1.02 (ref 1–1.03)
TOTAL PROTEIN: 7.5 G/DL (ref 6.4–8.2)
TROPONIN: <0.01 NG/ML
URINE REFLEX TO CULTURE: YES
URINE TYPE: ABNORMAL
UROBILINOGEN, URINE: 0.2 E.U./DL
WBC # BLD: 11.6 K/UL (ref 4–11)
WBC UA: 149 /HPF (ref 0–5)

## 2022-09-19 PROCEDURE — 93010 ELECTROCARDIOGRAM REPORT: CPT | Performed by: INTERNAL MEDICINE

## 2022-09-19 PROCEDURE — 70450 CT HEAD/BRAIN W/O DYE: CPT

## 2022-09-19 PROCEDURE — 96360 HYDRATION IV INFUSION INIT: CPT

## 2022-09-19 PROCEDURE — 99284 EMERGENCY DEPT VISIT MOD MDM: CPT

## 2022-09-19 PROCEDURE — 87086 URINE CULTURE/COLONY COUNT: CPT

## 2022-09-19 PROCEDURE — 85025 COMPLETE CBC W/AUTO DIFF WBC: CPT

## 2022-09-19 PROCEDURE — 72192 CT PELVIS W/O DYE: CPT

## 2022-09-19 PROCEDURE — 84484 ASSAY OF TROPONIN QUANT: CPT

## 2022-09-19 PROCEDURE — 2580000003 HC RX 258: Performed by: EMERGENCY MEDICINE

## 2022-09-19 PROCEDURE — 80053 COMPREHEN METABOLIC PANEL: CPT

## 2022-09-19 PROCEDURE — 93005 ELECTROCARDIOGRAM TRACING: CPT | Performed by: EMERGENCY MEDICINE

## 2022-09-19 PROCEDURE — 6370000000 HC RX 637 (ALT 250 FOR IP): Performed by: EMERGENCY MEDICINE

## 2022-09-19 PROCEDURE — 81001 URINALYSIS AUTO W/SCOPE: CPT

## 2022-09-19 RX ORDER — SULFAMETHOXAZOLE AND TRIMETHOPRIM 800; 160 MG/1; MG/1
1 TABLET ORAL ONCE
Status: COMPLETED | OUTPATIENT
Start: 2022-09-19 | End: 2022-09-19

## 2022-09-19 RX ORDER — 0.9 % SODIUM CHLORIDE 0.9 %
1000 INTRAVENOUS SOLUTION INTRAVENOUS ONCE
Status: COMPLETED | OUTPATIENT
Start: 2022-09-19 | End: 2022-09-19

## 2022-09-19 RX ORDER — SULFAMETHOXAZOLE AND TRIMETHOPRIM 800; 160 MG/1; MG/1
1 TABLET ORAL 2 TIMES DAILY
Qty: 20 TABLET | Refills: 0 | Status: SHIPPED | OUTPATIENT
Start: 2022-09-19 | End: 2022-09-29

## 2022-09-19 RX ADMIN — SODIUM CHLORIDE 1000 ML: 9 INJECTION, SOLUTION INTRAVENOUS at 18:51

## 2022-09-19 RX ADMIN — SULFAMETHOXAZOLE AND TRIMETHOPRIM 1 TABLET: 800; 160 TABLET ORAL at 20:26

## 2022-09-19 ASSESSMENT — PAIN DESCRIPTION - LOCATION: LOCATION: COCCYX

## 2022-09-19 ASSESSMENT — PAIN - FUNCTIONAL ASSESSMENT: PAIN_FUNCTIONAL_ASSESSMENT: 0-10

## 2022-09-19 ASSESSMENT — PAIN SCALES - GENERAL: PAINLEVEL_OUTOF10: 9

## 2022-09-20 ENCOUNTER — CARE COORDINATION (OUTPATIENT)
Dept: CARE COORDINATION | Age: 72
End: 2022-09-20

## 2022-09-20 NOTE — ED NOTES
Discharge and education instructions reviewed. Patient verbalized understanding, teach-back successful. Patient denied questions at this time. No acute distress noted. Patient instructed to follow-up as noted - return to emergency department if symptoms worsen. Patient verbalized understanding. Discharged per EDMD with discharge instructions.           Morales Irwin RN  09/19/22 1155

## 2022-09-20 NOTE — ED PROVIDER NOTES
[address] [facility]      Pt Name: Lavelle Vu  MRN: [id number]  [place] 1950  Date of evaluation: 9/19/2022  Provider: George Zamorano DO    CHIEF COMPLAINT  Chief Complaint   Patient presents with    Fall     Pt here for low Hr at home. States goes as low as 38. Sees a cardiologist and did a holter monitor last year. Also stated that she has had a couple falls where she loses her balance; fell last night. Stated she wasn't dizzy or loc, but it also wasn't mechanical.        I wore personal protective equipment when I was in the room the entire time. This includes gloves, N95 mask, face shield, and a glove over my stethoscope for protection. HPI  Lavelle Vu is a 67 y.o. female who presents with a fall landing on her tailbone that occurred just prior to arrival.  She denies any bleeding. She did hit her head. She denies being on blood thinners. She denies taking aspirin every day but her list of medications shows an aspirin. She denies headache. She does have a history of seizures and currently is on Lamictal.  She had one last year. She states she has shaking that is ongoing. She denies any new medications or medications over-the-counter that are new. She denies any medication changes. Nothing seems to make it better or worse. She describes it as moderate. REVIEW OF SYSTEMS  All systems negative except as noted in the HPI. Reviewed Nurses' notes and concur. No LMP recorded. Patient has had a hysterectomy. PAST MEDICAL HISTORY  Past Medical History:   Diagnosis Date    Back pain, chronic     Depression     Joint pain     Seizures ([facility] [address]     Sleep apnea        FAMILY HISTORY  Family History   Problem Relation Age of Onset    Heart Failure Mother     COPD Father        SOCIAL HISTORY   reports that she has never smoked.  She has never used smokeless tobacco. She reports that she does not drink alcohol and does not use drugs.    SURGICAL HISTORY  Past Surgical History:   Procedure Laterality Date    LUMBAR FUSION  2016    PARTIAL HYSTERECTOMY (CERVIX NOT REMOVED)  2002    SPINAL CORD STIMULATOR SURGERY      Transmit receive head coil only       CURRENT MEDICATIONS  Current Outpatient Rx   Medication Sig Dispense Refill    bacitracin zinc 500 UNIT/GM ointment Apply topically 2 times daily Apply topically 2 times daily. escitalopram (LEXAPRO) 20 MG tablet Take 1.5 tablets by mouth daily 135 tablet 3    lamoTRIgine (LAMICTAL) 100 MG tablet TAKE 1 TABLET TWICE A  tablet 3    spironolactone (ALDACTONE) 25 MG tablet TAKE 1 TABLET BY MOUTH EVERY DAY 90 tablet 1    gabapentin (NEURONTIN) 300 MG capsule TAKE 1 CAPSULE NIGHTLY 90 capsule 1    rosuvastatin (CRESTOR) 20 MG tablet Take 1 tablet by mouth daily 90 tablet 3    omeprazole (PRILOSEC) 20 MG delayed release capsule Take 1 capsule by mouth 2 times daily as needed (nauease and GI upset) = 180 capsule 3    metoprolol succinate (TOPROL XL) 100 MG extended release tablet TAKE 1 TABLET TWICE A  tablet 3    telmisartan (MICARDIS) 80 MG tablet TAKE 1 TABLET DAILY 90 tablet 1    metFORMIN (GLUCOPHAGE-XR) 500 MG extended release tablet TAKE 1 TABLET TWICE A DAY WITH MEALS 180 tablet 3    JARDIANCE 25 MG tablet Take 25 mg by mouth daily 90 tablet 3    amLODIPine (NORVASC) 10 MG tablet Take 1 tablet by mouth daily 90 tablet 3    vitamin D (ERGOCALCIFEROL) 1.25 MG (86103 UT) CAPS capsule TAKE 1 CAPSULE ONCE A WEEK 12 capsule 3    predniSONE (DELTASONE) 5 MG tablet TAKE 1 TABLET DAILY 90 tablet 3    aspirin EC 81 MG EC tablet Take 81 mg by mouth daily      blood glucose monitor kit and supplies Check sugars qd and prn 1 kit 0    blood glucose monitor strips Test 1 times a day & as needed for symptoms of irregular blood glucose.  100 strip 3    Lancets MISC 1 each by Does not apply route daily 100 each 5    niacin 500 MG extended release capsule Take 500 mg by mouth nightly CPAP Machine MISC by Does not apply route         ALLERGIES  Allergies   Allergen Reactions    Compazine [Prochlorperazine Maleate] Shortness Of Breath     dyspnea    Morphine Nausea And Vomiting     N&V       Tetanus vaccination status reviewed: tetanus re-vaccination not indicated. PHYSICAL EXAM  VITAL SIGNS: BP (!) 149/59   Pulse (!) 48   Temp 97.9 °F (36.6 °C) (Oral)   Resp 26   Wt 186 lb 4.6 oz (84.5 kg)   SpO2 97%   BMI 30.07 kg/m²   Constitutional: Well-developed, well-nourished, appears normal, nontoxic, activity: Resting company on the cart, no obvious pain, speaking full sentences, does not appear ill or toxic., not immobilized  HENT: Normocephalic, trauma-no, Bilateral external ears normal, Oropharynx moist, no oral injury, Nose normal.  Eyes: PERRLA, EOMI, Conjunctiva normal, atraumatic. Neck: Normal range of motion after cleared with exam, no tenderness, Supple,  Lymphatic: No lymphadenopathy noted. Cardiovascular: Normal heart rate, Normal rhythm, no murmurs, no gallops, no rubs. Thorax & Lungs: normal breath sounds, no respiratory distress, no wheezing, no rales, no rhonchi  Abdomen: Soft, no tender with no distension, no masses, no pulsatile masses, no hepatosplenomegaly, normal bowel sounds  Skin: Warm, Dry, No erythema, No rash. Back: Mild sacral tenderness, normal range of motion, No scoliosis. No deformities or step-offs are noted. Extremities:  neurovascular intact, no deformity, no swelling, no erythema, no lacerations noted, no amputations, no cyanosis, no mottling, no ecchymosis, no tenderness, capillary refill less than 2 seconds. Musculoskeletal: Good range of motion in all major joints except as noted above, No tenderness to palpation or major deformities except as noted above. Neurologic: Alert & oriented x 3, CN II through XII are intact, normal motor function, normal sensory function, no focal deficits noted.   Psychiatric: Affect normal, Judgment normal, Mood normal.    LABORATORY  Labs Reviewed   CBC WITH AUTO DIFFERENTIAL - Abnormal; Notable for the following components:       Result Value    WBC 11.6 (*)     Neutrophils Absolute 8.5 (*)     All other components within normal limits   COMPREHENSIVE METABOLIC PANEL W/ REFLEX TO MG FOR LOW K - Abnormal; Notable for the following components:    Sodium 135 (*)     Glucose 112 (*)     BUN 45 (*)     Creatinine 1.6 (*)     GFR Non- 32 (*)     GFR  38 (*)     All other components within normal limits   URINALYSIS WITH REFLEX TO CULTURE - Abnormal; Notable for the following components:    Clarity, UA TURBID (*)     Glucose, Ur 250 (*)     Ketones, Urine TRACE (*)     Protein, UA 30 (*)     Leukocyte Esterase, Urine LARGE (*)     All other components within normal limits   MICROSCOPIC URINALYSIS - Abnormal; Notable for the following components:    Bacteria, UA 4+ (*)     Hyaline Casts, UA 18 (*)     WBC,  (*)     Epithelial Cells, UA 45 (*)     All other components within normal limits   CULTURE, URINE   TROPONIN       ?  EKG  EKG Interpretation    Interpreted by emergency department physician  Time performed: 1528  Time read: 1532    Rhythm: Sinus  Ventricular Rate: 46  QRS Axis: 10  Ectopy: Sinus arrhythmia  Conduction: Sinus bradycardia with sinus arrhythmia with nonspecific interventricular conduction delay  ST Segments: normal  T Waves: normal  Q Waves: None    Other findings: None    Compared to EKG on: 10/25/2021 and is changed due to bradycardia. Clinical Impression: Sinus bradycardia with sinus arrhythmia with nonspecific interventricular conduction delay. This is compared to an EKG on 10/25/2021 and appears changed to the sinus bradycardia. Salena Looney,     RADIOLOGY/PROCEDURES  I personally reviewed the images for this case. CT HEAD WO CONTRAST   Final Result      1. No evidence of acute intracranial process.       2.  Findings of presumed mild small vessel ischemic deep white matter disease. 3.  Prominence of the sulci and/or CSF spaces suggests a degree of cerebral   atrophy. This asymmetrically is more prominent in the left anterior parietal   lobe and frontal lobe but unchanged. CT PELVIS WO CONTRAST Additional Contrast? None   Final Result   1. No evidence of fracture or dislocation. 2.  Degenerative changes as described above. COURSE & MEDICAL DECISION MAKING  Pertinent Labs, EKG, & Imaging studies reviewed. (See chart for details)    Vitals:    09/19/22 1521 09/19/22 1952 09/19/22 2000   BP: 136/72 (!) 139/55 (!) 149/59   Pulse: 50 53 (!) 48   Resp: 20 13 26   Temp: 97.9 °F (36.6 °C)     TempSrc: Oral     SpO2: 97% 96% 97%   Weight: 186 lb 4.6 oz (84.5 kg)         Medications   sulfamethoxazole-trimethoprim (BACTRIM DS;SEPTRA DS) 800-160 MG per tablet 1 tablet (has no administration in time range)   0.9 % sodium chloride bolus (0 mLs IntraVENous Stopped 9/19/22 1951)       New Prescriptions    No medications on file       SEP-1 CORE MEASURE DATA  Exclusion criteria: the patient is NOT to be included for sepsis due to:  SIRS criteria are not met    Patient remained stable in the ED. Lorenzo Mead work revealed a urinary tract infection. Her BUN and creatinine were mildly elevated and therefore appears to be dehydrated. Therefore, she was given a liter of fluids prior to discharge. She was given Bactrim DS for urinary tract infection. She is feeling much better after fluids. She is going to follow-up with her doctor on Thursday as scheduled and return to the emergency department for any problems. The patient's blood pressure was found to be elevated according to CMS/Medicare and the Affordable Care Act/ObamaCare criteria. Elevated blood pressure could occur because of pain or anxiety or other reasons and does not mean that they need to have their blood pressure treated or medications otherwise adjusted.  However, this could also be a sign that they will need to have their blood pressure treated or medications changed. The patient was instructed to follow up closely with their personal physician to have their blood pressure rechecked. The patient was instructed to take a list of recent blood pressure readings to their next visit with their personal physician. See discharge instructions for specific medications, discharge information, and treatments. They were verbally instructed to return to emergency if any problems. (This chart has been completed using [address]. Although attempts have been made to ensure accuracy, words and/or phrases may not be transcribed as intended.)    Patient refused pain medicines at the time of their exam.    IMPRESSION(S):  1. Dehydration    2. Acute cystitis without hematuria        ?   Recheck Times: 2000    Diagnostic considerations include but are not limited to:  fracture or dislocation, visceral injury, intracranial bleed, spinal cord injury, pelvic injury, head injury, blunt chest trauma, blunt abdominal trauma, laceration, abrasions, contusions, other      Mili Weber DO  09/19/22 2016

## 2022-09-21 LAB — URINE CULTURE, ROUTINE: NORMAL

## 2022-10-04 ENCOUNTER — OFFICE VISIT (OUTPATIENT)
Dept: INTERNAL MEDICINE CLINIC | Age: 72
End: 2022-10-04
Payer: MEDICARE

## 2022-10-04 VITALS
BODY MASS INDEX: 29.86 KG/M2 | DIASTOLIC BLOOD PRESSURE: 82 MMHG | OXYGEN SATURATION: 97 % | SYSTOLIC BLOOD PRESSURE: 138 MMHG | WEIGHT: 185 LBS | HEART RATE: 52 BPM

## 2022-10-04 DIAGNOSIS — R41.3 MEMORY LOSS OR IMPAIRMENT: Primary | ICD-10-CM

## 2022-10-04 DIAGNOSIS — I10 ESSENTIAL HYPERTENSION: ICD-10-CM

## 2022-10-04 DIAGNOSIS — F41.9 ANXIETY AND DEPRESSION: ICD-10-CM

## 2022-10-04 DIAGNOSIS — M51.36 DDD (DEGENERATIVE DISC DISEASE), LUMBAR: ICD-10-CM

## 2022-10-04 DIAGNOSIS — F32.A ANXIETY AND DEPRESSION: ICD-10-CM

## 2022-10-04 DIAGNOSIS — N18.32 STAGE 3B CHRONIC KIDNEY DISEASE (HCC): ICD-10-CM

## 2022-10-04 DIAGNOSIS — G89.4 CHRONIC PAIN DISORDER: ICD-10-CM

## 2022-10-04 DIAGNOSIS — R00.1 BRADYCARDIA: ICD-10-CM

## 2022-10-04 LAB
ANION GAP SERPL CALCULATED.3IONS-SCNC: 15 MMOL/L (ref 3–16)
BUN BLDV-MCNC: 45 MG/DL (ref 7–20)
CALCIUM SERPL-MCNC: 10.4 MG/DL (ref 8.3–10.6)
CHLORIDE BLD-SCNC: 102 MMOL/L (ref 99–110)
CO2: 19 MMOL/L (ref 21–32)
CREAT SERPL-MCNC: 1.8 MG/DL (ref 0.6–1.2)
GFR AFRICAN AMERICAN: 33
GFR NON-AFRICAN AMERICAN: 28
GLUCOSE BLD-MCNC: 103 MG/DL (ref 70–99)
POTASSIUM SERPL-SCNC: 5.8 MMOL/L (ref 3.5–5.1)
SODIUM BLD-SCNC: 136 MMOL/L (ref 136–145)

## 2022-10-04 PROCEDURE — G8417 CALC BMI ABV UP PARAM F/U: HCPCS | Performed by: INTERNAL MEDICINE

## 2022-10-04 PROCEDURE — G8484 FLU IMMUNIZE NO ADMIN: HCPCS | Performed by: INTERNAL MEDICINE

## 2022-10-04 PROCEDURE — 99215 OFFICE O/P EST HI 40 MIN: CPT | Performed by: INTERNAL MEDICINE

## 2022-10-04 PROCEDURE — 3017F COLORECTAL CA SCREEN DOC REV: CPT | Performed by: INTERNAL MEDICINE

## 2022-10-04 PROCEDURE — 1036F TOBACCO NON-USER: CPT | Performed by: INTERNAL MEDICINE

## 2022-10-04 PROCEDURE — G8427 DOCREV CUR MEDS BY ELIG CLIN: HCPCS | Performed by: INTERNAL MEDICINE

## 2022-10-04 PROCEDURE — G8400 PT W/DXA NO RESULTS DOC: HCPCS | Performed by: INTERNAL MEDICINE

## 2022-10-04 PROCEDURE — G0008 ADMIN INFLUENZA VIRUS VAC: HCPCS | Performed by: INTERNAL MEDICINE

## 2022-10-04 PROCEDURE — 1090F PRES/ABSN URINE INCON ASSESS: CPT | Performed by: INTERNAL MEDICINE

## 2022-10-04 PROCEDURE — 90694 VACC AIIV4 NO PRSRV 0.5ML IM: CPT | Performed by: INTERNAL MEDICINE

## 2022-10-04 PROCEDURE — 1123F ACP DISCUSS/DSCN MKR DOCD: CPT | Performed by: INTERNAL MEDICINE

## 2022-10-04 RX ORDER — METOPROLOL SUCCINATE 100 MG/1
100 TABLET, EXTENDED RELEASE ORAL DAILY
Qty: 90 TABLET | Refills: 3
Start: 2022-10-04

## 2022-10-04 NOTE — PATIENT INSTRUCTIONS
Decrease metoprolol to 100 mg 1 pill daily. Please make an appointment to see Dr. Trisha Walls for nephrology. We did a [employer] test today to check for pain medication and anti-depressants that will be more helpful for you.

## 2022-10-04 NOTE — PROGRESS NOTES
Patient: Roberth Lopez is a 67 y.o. female who presents today with the following Chief Complaint(s):  Chief Complaint   Patient presents with    Depression       HPI    Here today for follow up. Had a fall 3 weeks ago, went out to look at her flowers on the deck and she fell backwards. Has not had good balance \"for awhile\". Just went backwards. No fall. No seizure activity. HR was in the 30's at time of fall. Per her daughter, who is an RN, her HR was in the 40's-50's in the ED. Was also told that she was dehydrated and had a UTI, given IVF and treated with Bactrim. No LOC. Is sleeping a lot during the day. Is depressed. Did try taking hydrocodone    Results for orders placed or performed during the hospital encounter of 09/19/22   Culture, Urine    Specimen: Urine, clean catch   Result Value Ref Range    Urine Culture, Routine       >50,000 CFU/ml Mixed pathogens  Multiple organisms isolated, no predominance. Culture  indicates probable contamination. Please review colony count  and clinical indications to determine if a repeat culture is  necessary. No further workup to be done.      CBC with Auto Differential   Result Value Ref Range    WBC 11.6 (H) 4.0 - 11.0 K/uL    RBC 4.34 4.00 - 5.20 M/uL    Hemoglobin 12.6 12.0 - 16.0 g/dL    Hematocrit 38.9 36.0 - 48.0 %    MCV 89.6 80.0 - 100.0 fL    MCH 28.9 26.0 - 34.0 pg    MCHC 32.3 31.0 - 36.0 g/dL    RDW 13.5 12.4 - 15.4 %    Platelets 967 767 - 807 K/uL    MPV 8.0 5.0 - 10.5 fL    Neutrophils % 72.9 %    Lymphocytes % 15.2 %    Monocytes % 10.7 %    Eosinophils % 0.9 %    Basophils % 0.3 %    Neutrophils Absolute 8.5 (H) 1.7 - 7.7 K/uL    Lymphocytes Absolute 1.8 1.0 - 5.1 K/uL    Monocytes Absolute 1.2 0.0 - 1.3 K/uL    Eosinophils Absolute 0.1 0.0 - 0.6 K/uL    Basophils Absolute 0.0 0.0 - 0.2 K/uL   CMP w/ Reflex to MG   Result Value Ref Range    Sodium 135 (L) 136 - 145 mmol/L    Potassium reflex Magnesium 4.6 3.5 - 5.1 mmol/L    Chloride 101 99 - 110 mmol/L    CO2 23 21 - 32 mmol/L    Anion Gap 11 3 - 16    Glucose 112 (H) 70 - 99 mg/dL    BUN 45 (H) 7 - 20 mg/dL    Creatinine 1.6 (H) 0.6 - 1.2 mg/dL    GFR Non- 32 (A) >60    GFR  38 (A) >60    Calcium 10.5 8.3 - 10.6 mg/dL    Total Protein 7.5 6.4 - 8.2 g/dL    Albumin 4.3 3.4 - 5.0 g/dL    Albumin/Globulin Ratio 1.3 1.1 - 2.2    Total Bilirubin 0.4 0.0 - 1.0 mg/dL    Alkaline Phosphatase 72 40 - 129 U/L    ALT 23 10 - 40 U/L    AST 17 15 - 37 U/L   Urinalysis with Reflex to Culture    Specimen: Urine   Result Value Ref Range    Color, UA Yellow Straw/Yellow    Clarity, UA TURBID (A) Clear    Glucose, Ur 250 (A) Negative mg/dL    Bilirubin Urine Negative Negative    Ketones, Urine TRACE (A) Negative mg/dL    Specific Gravity, UA 1.019 1.005 - 1.030    Blood, Urine Negative Negative    pH, UA 5.0 5.0 - 8.0    Protein, UA 30 (A) Negative mg/dL    Urobilinogen, Urine 0.2 <2.0 E.U./dL    Nitrite, Urine Negative Negative    Leukocyte Esterase, Urine LARGE (A) Negative    Microscopic Examination YES     Urine Type NotGiven     Urine Reflex to Culture Yes    Troponin   Result Value Ref Range    Troponin <0.01 <0.01 ng/mL   Microscopic Urinalysis   Result Value Ref Range    RBC, UA 0-2 0 - 4 /HPF    Bacteria, UA 4+ (A) None Seen /HPF    Urinalysis Comments see below     Hyaline Casts, UA 18 (H) 0 - 8 /LPF    WBC,  (H) 0 - 5 /HPF    Epithelial Cells, UA 45 (H) 0 - 5 /HPF   EKG 12 Lead   Result Value Ref Range    Ventricular Rate 46 BPM    Atrial Rate 46 BPM    P-R Interval 148 ms    QRS Duration 98 ms    Q-T Interval 450 ms    QTc Calculation (Bazett) 393 ms    P Axis 74 degrees    R Axis 10 degrees    T Axis 57 degrees    Diagnosis       Sinus bradycardia with sinus arrhythmiaBaseline artifactOtherwise normal ECGNo previous ECGs availableConfirmed by Elena Parmar MD, Roberta Durham ([id number]) on 9/19/2022 5:21:07 PM          Allergies   Allergen Reactions    Compazine [Prochlorperazine Maleate] Shortness Of Breath     dyspnea    Morphine Nausea And Vomiting     N&V      Past Medical History:   Diagnosis Date    Back pain, chronic     Depression     Joint pain     Seizures ([address]     Sleep apnea       Past Surgical History:   Procedure Laterality Date    LUMBAR FUSION  2016    PARTIAL HYSTERECTOMY (CERVIX NOT REMOVED)  2002    SPINAL CORD STIMULATOR SURGERY      Transmit receive head coil only      Social History     Socioeconomic History    Marital status:      Spouse name: Not on file    Number of children: Not on file    Years of education: Not on file    Highest education level: Not on file   Occupational History    Occupation: retired   Tobacco Use    Smoking status: Never    Smokeless tobacco: Never   Vaping Use    Vaping Use: Never used   Substance and Sexual Activity    Alcohol use: No    Drug use: No    Sexual activity: Not on file   Other Topics Concern    Not on file   Social History Narrative    Not on file     Social Determinants of Health     Financial Resource Strain: Low Risk     Difficulty of Paying Living Expenses: Not hard at all   Food Insecurity: No Food Insecurity    Worried About Running Out of Food in the Last Year: Never true    Ran Out of Food in the Last Year: Never true   Transportation Needs: Not on file   Physical Activity: Not on file   Stress: Not on file   Social Connections: Not on file   Intimate Partner Violence: Not on file   Housing Stability: Not on file     Family History   Problem Relation Age of Onset    Heart Failure Mother     COPD Father         Outpatient Medications Prior to Visit   Medication Sig Dispense Refill    bacitracin zinc 500 UNIT/GM ointment Apply topically 2 times daily Apply topically 2 times daily.       escitalopram (LEXAPRO) 20 MG tablet Take 1.5 tablets by mouth daily 135 tablet 3    lamoTRIgine (LAMICTAL) 100 MG tablet TAKE 1 TABLET TWICE A  tablet 3    spironolactone (ALDACTONE) 25 MG tablet TAKE 1 TABLET BY MOUTH EVERY DAY 90 tablet 1    gabapentin (NEURONTIN) 300 MG capsule TAKE 1 CAPSULE NIGHTLY 90 capsule 1    rosuvastatin (CRESTOR) 20 MG tablet Take 1 tablet by mouth daily 90 tablet 3    omeprazole (PRILOSEC) 20 MG delayed release capsule Take 1 capsule by mouth 2 times daily as needed (nauease and GI upset) = 180 capsule 3    telmisartan (MICARDIS) 80 MG tablet TAKE 1 TABLET DAILY 90 tablet 1    metFORMIN (GLUCOPHAGE-XR) 500 MG extended release tablet TAKE 1 TABLET TWICE A DAY WITH MEALS 180 tablet 3    JARDIANCE 25 MG tablet Take 25 mg by mouth daily 90 tablet 3    amLODIPine (NORVASC) 10 MG tablet Take 1 tablet by mouth daily 90 tablet 3    vitamin D (ERGOCALCIFEROL) 1.25 MG (36029 UT) CAPS capsule TAKE 1 CAPSULE ONCE A WEEK 12 capsule 3    predniSONE (DELTASONE) 5 MG tablet TAKE 1 TABLET DAILY 90 tablet 3    aspirin EC 81 MG EC tablet Take 81 mg by mouth daily      blood glucose monitor kit and supplies Check sugars qd and prn 1 kit 0    blood glucose monitor strips Test 1 times a day & as needed for symptoms of irregular blood glucose. 100 strip 3    Lancets MISC 1 each by Does not apply route daily 100 each 5    niacin 500 MG extended release capsule Take 500 mg by mouth nightly      CPAP Machine MISC by Does not apply route      metoprolol succinate (TOPROL XL) 100 MG extended release tablet TAKE 1 TABLET TWICE A  tablet 3     No facility-administered medications prior to visit. Patient'spast medical history, surgical history, family history, medications,  and allergies  were all reviewed and updated as appropriate today. Review of Systems    /82 (Site: Left Upper Arm, Position: Sitting, Cuff Size: Medium Adult)   Pulse 52   Wt 185 lb (83.9 kg)   SpO2 97%   BMI 29.86 kg/m²   Physical Exam  Vitals and nursing note reviewed. Constitutional:       Appearance: She is well-developed. She is not toxic-appearing. HENT:      Head: Normocephalic.       Right Ear: Tympanic membrane, ear canal and external ear normal.      Left Ear: Tympanic membrane, ear canal and external ear normal.      Mouth/Throat:      Pharynx: No oropharyngeal exudate or posterior oropharyngeal erythema. Eyes:      General: No scleral icterus. Extraocular Movements: Extraocular movements intact. Conjunctiva/sclera: Conjunctivae normal.      Pupils: Pupils are equal, round, and reactive to light. Neck:      Thyroid: No thyroid mass or thyromegaly. Vascular: No carotid bruit. Cardiovascular:      Rate and Rhythm: Regular rhythm. Bradycardia present. Heart sounds: Normal heart sounds. No murmur heard. Pulmonary:      Effort: Pulmonary effort is normal.      Breath sounds: Normal breath sounds. Musculoskeletal:      Right lower leg: No edema. Left lower leg: No edema. Lymphadenopathy:      Cervical: No cervical adenopathy. Neurological:      General: No focal deficit present. Mental Status: She is alert and oriented to person, place, and time. Psychiatric:         Mood and Affect: Mood normal.         Behavior: Behavior normal. Behavior is cooperative. ASSESSMENT/PLAN:    Problem List Items Addressed This Visit       Anxiety and depression     Continues to struggle with depression. A lot of her depression relates to her pain. Check [facility]. Continue Lexapro 20 mg daily but will adjust based on results. Referral placed to psychiatry to help with medication adjustment. Patient did not do well on Cymbalta. Relevant Orders    External Referral to Psychiatry    Bradycardia      Decrease metoprolol ER from 200 mg daily to 100 mg daily. Chronic pain disorder      Patient with chronic pain secondary to degenerative disc disease of the lumbar and cervical spines. Patient has been reluctant to take narcotics for years because of her son's history of substance abuse.   She is recently been more amenable to narcotics and due to the negative impact pain has had on her quality of life as well as worsening of her depression. She has tried [employer] which caused nausea and did not help with her pain and Norco which she did not think was helpful. She is not a good candidate for tramadol given previous seizure and concomitant SSRI use. Check [facility]Sight for pain medication. Reviewed with patient there is a high likelihood she will end up on the medicine such as oxycodone given intolerance of hydrocodone. DDD (degenerative disc disease), lumbar     Long discussion with patient regarding pain management. She has not had improvement of Butrans or Norco.  Reluctant to use tramadol with seizure history. Check [facility]. Reviewed with patient would likely end up using oxycodone which is concerning for her as her son does struggle with substance abuse. Reviewed safe care of narcotics to avoid her son abusing them. .  Unable to use NSAIDs due to chronic kidney disease. Remains on prednisone 5 mg daily. Pain is significantly impacting patient's quality of life. Essential hypertension     Above goal.  Check renal panel. If kidney functions okay, will increase Aldactone to 50 mg daily. Continue Micardis 80 mg daily, amlodipine 10 mg daily. Decrease metoprolol ER to 100 mg daily due to bradycardia resulting in near syncopal episode. Relevant Medications    metoprolol succinate (TOPROL XL) 100 MG extended release tablet    Other Relevant Orders    Basic Metabolic Panel    Basic Metabolic Panel (Completed)    External Referral To Nephrology    Memory loss or impairment - Primary     Patient did see neurology and did undergo neuropsychiatric testing and was told that she does not have dementia. I do not have a copy of this report. MRI does show white matter changes. I do suspect patient's depression and pain are impacting her memory.          Relevant Orders    External Referral to Psychiatry    Stage 3b chronic kidney disease [facility])      Patient previously saw Dr. Shon Paul at [address] We will refer her back to him. Check renal panel today. Chronic kidney disease is related to chronic NSAID use as well as hypertension. Relevant Orders    Basic Metabolic Panel    Basic Metabolic Panel (Completed)    External Referral To Nephrology       Current Outpatient Medications   Medication Sig Dispense Refill    metoprolol succinate (TOPROL XL) 100 MG extended release tablet Take 1 tablet by mouth daily 90 tablet 3    bacitracin zinc 500 UNIT/GM ointment Apply topically 2 times daily Apply topically 2 times daily. escitalopram (LEXAPRO) 20 MG tablet Take 1.5 tablets by mouth daily 135 tablet 3    lamoTRIgine (LAMICTAL) 100 MG tablet TAKE 1 TABLET TWICE A  tablet 3    spironolactone (ALDACTONE) 25 MG tablet TAKE 1 TABLET BY MOUTH EVERY DAY 90 tablet 1    gabapentin (NEURONTIN) 300 MG capsule TAKE 1 CAPSULE NIGHTLY 90 capsule 1    rosuvastatin (CRESTOR) 20 MG tablet Take 1 tablet by mouth daily 90 tablet 3    omeprazole (PRILOSEC) 20 MG delayed release capsule Take 1 capsule by mouth 2 times daily as needed (nauease and GI upset) = 180 capsule 3    telmisartan (MICARDIS) 80 MG tablet TAKE 1 TABLET DAILY 90 tablet 1    metFORMIN (GLUCOPHAGE-XR) 500 MG extended release tablet TAKE 1 TABLET TWICE A DAY WITH MEALS 180 tablet 3    JARDIANCE 25 MG tablet Take 25 mg by mouth daily 90 tablet 3    amLODIPine (NORVASC) 10 MG tablet Take 1 tablet by mouth daily 90 tablet 3    vitamin D (ERGOCALCIFEROL) 1.25 MG (62837 UT) CAPS capsule TAKE 1 CAPSULE ONCE A WEEK 12 capsule 3    predniSONE (DELTASONE) 5 MG tablet TAKE 1 TABLET DAILY 90 tablet 3    aspirin EC 81 MG EC tablet Take 81 mg by mouth daily      blood glucose monitor kit and supplies Check sugars qd and prn 1 kit 0    blood glucose monitor strips Test 1 times a day & as needed for symptoms of irregular blood glucose.  100 strip 3    Lancets MISC 1 each by Does not apply route daily 100 each 5    niacin 500 MG extended release capsule Take 500 mg by mouth nightly      CPAP Machine MISC by Does not apply route       No current facility-administered medications for this visit. Return in about 4 weeks (around 11/1/2022). 42 minutes spent in face-to-face contact with patient today.

## 2022-10-10 NOTE — ASSESSMENT & PLAN NOTE
Patient with chronic pain secondary to degenerative disc disease of the lumbar and cervical spines. Patient has been reluctant to take narcotics for years because of her son's history of substance abuse. She is recently been more amenable to narcotics and due to the negative impact pain has had on her quality of life as well as worsening of her depression. She has tried [employer] which caused nausea and did not help with her pain and Norco which she did not think was helpful. She is not a good candidate for tramadol given previous seizure and concomitant SSRI use. Check [facility]ight for pain medication. Reviewed with patient there is a high likelihood she will end up on the medicine such as oxycodone given intolerance of hydrocodone.

## 2022-10-10 NOTE — ASSESSMENT & PLAN NOTE
Continues to struggle with depression. A lot of her depression relates to her pain. Check [facility]ight. Continue Lexapro 20 mg daily but will adjust based on results. Referral placed to psychiatry to help with medication adjustment. Patient did not do well on Cymbalta.

## 2022-10-10 NOTE — ASSESSMENT & PLAN NOTE
Long discussion with patient regarding pain management. She has not had improvement of Butrans or Norco.  Reluctant to use tramadol with seizure history. Check [facility]ight. Reviewed with patient would likely end up using oxycodone which is concerning for her as her son does struggle with substance abuse. Reviewed safe care of narcotics to avoid her son abusing them. .  Unable to use NSAIDs due to chronic kidney disease. Remains on prednisone 5 mg daily. Pain is significantly impacting patient's quality of life.

## 2022-10-10 NOTE — ASSESSMENT & PLAN NOTE
Above goal.  Check renal panel. If kidney functions okay, will increase Aldactone to 50 mg daily. Continue Micardis 80 mg daily, amlodipine 10 mg daily. Decrease metoprolol ER to 100 mg daily due to bradycardia resulting in near syncopal episode.

## 2022-10-10 NOTE — ASSESSMENT & PLAN NOTE
Patient previously saw Dr. Misti Dolan at [facility]. We will refer her back to him. Check renal panel today. Chronic kidney disease is related to chronic NSAID use as well as hypertension.

## 2022-10-10 NOTE — ASSESSMENT & PLAN NOTE
Patient did see neurology and did undergo neuropsychiatric testing and was told that she does not have dementia. I do not have a copy of this report. MRI does show white matter changes. I do suspect patient's depression and pain are impacting her memory.

## 2022-11-04 ENCOUNTER — OFFICE VISIT (OUTPATIENT)
Dept: INTERNAL MEDICINE CLINIC | Age: 72
End: 2022-11-04
Payer: MEDICARE

## 2022-11-04 VITALS
HEART RATE: 52 BPM | DIASTOLIC BLOOD PRESSURE: 70 MMHG | WEIGHT: 186.29 LBS | SYSTOLIC BLOOD PRESSURE: 140 MMHG | BODY MASS INDEX: 30.07 KG/M2

## 2022-11-04 VITALS
OXYGEN SATURATION: 97 % | WEIGHT: 186.2 LBS | TEMPERATURE: 125.6 F | BODY MASS INDEX: 29.92 KG/M2 | HEIGHT: 66 IN | DIASTOLIC BLOOD PRESSURE: 70 MMHG | SYSTOLIC BLOOD PRESSURE: 140 MMHG

## 2022-11-04 DIAGNOSIS — F32.A ANXIETY AND DEPRESSION: ICD-10-CM

## 2022-11-04 DIAGNOSIS — M51.36 DDD (DEGENERATIVE DISC DISEASE), LUMBAR: ICD-10-CM

## 2022-11-04 DIAGNOSIS — Z00.00 MEDICARE ANNUAL WELLNESS VISIT, SUBSEQUENT: Primary | ICD-10-CM

## 2022-11-04 DIAGNOSIS — I10 ESSENTIAL HYPERTENSION: ICD-10-CM

## 2022-11-04 DIAGNOSIS — N18.32 STAGE 3B CHRONIC KIDNEY DISEASE (HCC): ICD-10-CM

## 2022-11-04 DIAGNOSIS — F41.9 ANXIETY AND DEPRESSION: ICD-10-CM

## 2022-11-04 DIAGNOSIS — G89.4 CHRONIC PAIN DISORDER: Primary | ICD-10-CM

## 2022-11-04 PROCEDURE — 1090F PRES/ABSN URINE INCON ASSESS: CPT | Performed by: INTERNAL MEDICINE

## 2022-11-04 PROCEDURE — 3074F SYST BP LT 130 MM HG: CPT | Performed by: INTERNAL MEDICINE

## 2022-11-04 PROCEDURE — 3078F DIAST BP <80 MM HG: CPT | Performed by: INTERNAL MEDICINE

## 2022-11-04 PROCEDURE — G0439 PPPS, SUBSEQ VISIT: HCPCS | Performed by: INTERNAL MEDICINE

## 2022-11-04 PROCEDURE — G8400 PT W/DXA NO RESULTS DOC: HCPCS | Performed by: INTERNAL MEDICINE

## 2022-11-04 PROCEDURE — 3017F COLORECTAL CA SCREEN DOC REV: CPT | Performed by: INTERNAL MEDICINE

## 2022-11-04 PROCEDURE — G8417 CALC BMI ABV UP PARAM F/U: HCPCS | Performed by: INTERNAL MEDICINE

## 2022-11-04 PROCEDURE — G8428 CUR MEDS NOT DOCUMENT: HCPCS | Performed by: INTERNAL MEDICINE

## 2022-11-04 PROCEDURE — 1123F ACP DISCUSS/DSCN MKR DOCD: CPT | Performed by: INTERNAL MEDICINE

## 2022-11-04 PROCEDURE — 99214 OFFICE O/P EST MOD 30 MIN: CPT | Performed by: INTERNAL MEDICINE

## 2022-11-04 PROCEDURE — G8484 FLU IMMUNIZE NO ADMIN: HCPCS | Performed by: INTERNAL MEDICINE

## 2022-11-04 PROCEDURE — 1036F TOBACCO NON-USER: CPT | Performed by: INTERNAL MEDICINE

## 2022-11-04 RX ORDER — VENLAFAXINE HYDROCHLORIDE 37.5 MG/1
37.5 CAPSULE, EXTENDED RELEASE ORAL DAILY
Qty: 7 CAPSULE | Refills: 0 | Status: SHIPPED | OUTPATIENT
Start: 2022-11-11 | End: 2022-11-18

## 2022-11-04 RX ORDER — OXYCODONE HYDROCHLORIDE AND ACETAMINOPHEN 5; 325 MG/1; MG/1
.5-1 TABLET ORAL EVERY 8 HOURS PRN
Qty: 20 TABLET | Refills: 0 | Status: SHIPPED | OUTPATIENT
Start: 2022-11-04 | End: 2022-11-18 | Stop reason: SDUPTHER

## 2022-11-04 RX ORDER — VENLAFAXINE HYDROCHLORIDE 75 MG/1
150 CAPSULE, EXTENDED RELEASE ORAL DAILY
Qty: 30 CAPSULE | Refills: 2 | Status: SHIPPED | OUTPATIENT
Start: 2022-11-18

## 2022-11-04 SDOH — ECONOMIC STABILITY: FOOD INSECURITY: WITHIN THE PAST 12 MONTHS, THE FOOD YOU BOUGHT JUST DIDN'T LAST AND YOU DIDN'T HAVE MONEY TO GET MORE.: NEVER TRUE

## 2022-11-04 SDOH — ECONOMIC STABILITY: FOOD INSECURITY: WITHIN THE PAST 12 MONTHS, YOU WORRIED THAT YOUR FOOD WOULD RUN OUT BEFORE YOU GOT MONEY TO BUY MORE.: NEVER TRUE

## 2022-11-04 ASSESSMENT — PATIENT HEALTH QUESTIONNAIRE - PHQ9
9. THOUGHTS THAT YOU WOULD BE BETTER OFF DEAD, OR OF HURTING YOURSELF: 0
5. POOR APPETITE OR OVEREATING: 0
8. MOVING OR SPEAKING SO SLOWLY THAT OTHER PEOPLE COULD HAVE NOTICED. OR THE OPPOSITE, BEING SO FIGETY OR RESTLESS THAT YOU HAVE BEEN MOVING AROUND A LOT MORE THAN USUAL: 0
4. FEELING TIRED OR HAVING LITTLE ENERGY: 3
10. IF YOU CHECKED OFF ANY PROBLEMS, HOW DIFFICULT HAVE THESE PROBLEMS MADE IT FOR YOU TO DO YOUR WORK, TAKE CARE OF THINGS AT HOME, OR GET ALONG WITH OTHER PEOPLE: 1
SUM OF ALL RESPONSES TO PHQ QUESTIONS 1-9: 12
SUM OF ALL RESPONSES TO PHQ QUESTIONS 1-9: 12
SUM OF ALL RESPONSES TO PHQ9 QUESTIONS 1 & 2: 6
1. LITTLE INTEREST OR PLEASURE IN DOING THINGS: 3
7. TROUBLE CONCENTRATING ON THINGS, SUCH AS READING THE NEWSPAPER OR WATCHING TELEVISION: 1
3. TROUBLE FALLING OR STAYING ASLEEP: 2
2. FEELING DOWN, DEPRESSED OR HOPELESS: 3
SUM OF ALL RESPONSES TO PHQ QUESTIONS 1-9: 12
6. FEELING BAD ABOUT YOURSELF - OR THAT YOU ARE A FAILURE OR HAVE LET YOURSELF OR YOUR FAMILY DOWN: 0
SUM OF ALL RESPONSES TO PHQ QUESTIONS 1-9: 12

## 2022-11-04 ASSESSMENT — SOCIAL DETERMINANTS OF HEALTH (SDOH): HOW HARD IS IT FOR YOU TO PAY FOR THE VERY BASICS LIKE FOOD, HOUSING, MEDICAL CARE, AND HEATING?: NOT HARD AT ALL

## 2022-11-04 ASSESSMENT — LIFESTYLE VARIABLES
HOW MANY STANDARD DRINKS CONTAINING ALCOHOL DO YOU HAVE ON A TYPICAL DAY: PATIENT DOES NOT DRINK
HOW OFTEN DO YOU HAVE A DRINK CONTAINING ALCOHOL: NEVER

## 2022-11-04 NOTE — PROGRESS NOTES
Patient: Shawanda Campbell is a 67 y.o. female who presents today with the following Chief Complaint(s):  No chief complaint on file. HPI    Here today for follow up. Reviewed Gene Sight  Did see Dr. Itzel Miller for nephrology 11/2/22. Note reviewed. Made no changes to her current medications. Ordered labs which were done today. HTN- BP has been running \"great\" at home- 121/51.      Allergies   Allergen Reactions    Codeine Nausea And Vomiting    Compazine [Prochlorperazine Maleate] Shortness Of Breath     dyspnea    Morphine Nausea And Vomiting     N&V      Past Medical History:   Diagnosis Date    Back pain, chronic     Depression     Joint pain     Seizures ([address]     Sleep apnea       Past Surgical History:   Procedure Laterality Date    LUMBAR FUSION  2016    PARTIAL HYSTERECTOMY (CERVIX NOT REMOVED)  2002    SPINAL CORD STIMULATOR SURGERY      Transmit receive head coil only      Social History     Socioeconomic History    Marital status:      Spouse name: Not on file    Number of children: Not on file    Years of education: Not on file    Highest education level: Not on file   Occupational History    Occupation: retired   Tobacco Use    Smoking status: Never    Smokeless tobacco: Never   Vaping Use    Vaping Use: Never used   Substance and Sexual Activity    Alcohol use: No    Drug use: No    Sexual activity: Not on file   Other Topics Concern    Not on file   Social History Narrative    Not on file     Social Determinants of Health     Financial Resource Strain: Low Risk     Difficulty of Paying Living Expenses: Not hard at all   Food Insecurity: No Food Insecurity    Worried About Running Out of Food in the Last Year: Never true    [address] in the Last Year: Never true   Transportation Needs: Not on file   Physical Activity: Inactive    Days of Exercise per Week: 0 days    Minutes of Exercise per Session: 0 min   Stress: Not on file   Social Connections: Not on file   Intimate Partner Violence: Not on file   Housing Stability: Not on file     Family History   Problem Relation Age of Onset    Heart Failure Mother     COPD Father         Outpatient Medications Prior to Visit   Medication Sig Dispense Refill    metoprolol succinate (TOPROL XL) 100 MG extended release tablet Take 1 tablet by mouth daily 90 tablet 3    escitalopram (LEXAPRO) 20 MG tablet Take 1.5 tablets by mouth daily 135 tablet 3    lamoTRIgine (LAMICTAL) 100 MG tablet TAKE 1 TABLET TWICE A  tablet 3    spironolactone (ALDACTONE) 25 MG tablet TAKE 1 TABLET BY MOUTH EVERY DAY (Patient not taking: Reported on 11/4/2022) 90 tablet 1    gabapentin (NEURONTIN) 300 MG capsule TAKE 1 CAPSULE NIGHTLY 90 capsule 1    rosuvastatin (CRESTOR) 20 MG tablet Take 1 tablet by mouth daily 90 tablet 3    omeprazole (PRILOSEC) 20 MG delayed release capsule Take 1 capsule by mouth 2 times daily as needed (nauease and GI upset) = 180 capsule 3    telmisartan (MICARDIS) 80 MG tablet TAKE 1 TABLET DAILY 90 tablet 1    metFORMIN (GLUCOPHAGE-XR) 500 MG extended release tablet TAKE 1 TABLET TWICE A DAY WITH MEALS 180 tablet 3    JARDIANCE 25 MG tablet Take 25 mg by mouth daily 90 tablet 3    amLODIPine (NORVASC) 10 MG tablet Take 1 tablet by mouth daily 90 tablet 3    vitamin D (ERGOCALCIFEROL) 1.25 MG (64770 UT) CAPS capsule TAKE 1 CAPSULE ONCE A WEEK 12 capsule 3    predniSONE (DELTASONE) 5 MG tablet TAKE 1 TABLET DAILY 90 tablet 3    blood glucose monitor kit and supplies Check sugars qd and prn 1 kit 0    blood glucose monitor strips Test 1 times a day & as needed for symptoms of irregular blood glucose. 100 strip 3    Lancets MISC 1 each by Does not apply route daily 100 each 5    niacin 500 MG extended release capsule Take 500 mg by mouth nightly      CPAP Machine MISC by Does not apply route       No facility-administered medications prior to visit.        Patient'spast medical history, surgical history, family history, medications,  and allergies  were all reviewed and updated as appropriate today. Review of Systems    BP (!) 140/70   Pulse 52   Wt 186 lb 4.6 oz (84.5 kg)   BMI 30.07 kg/m²   Physical Exam  Vitals and nursing note reviewed. Constitutional:       Appearance: Normal appearance. She is well-developed. She is not toxic-appearing. HENT:      Head: Normocephalic. Right Ear: Tympanic membrane, ear canal and external ear normal.      Left Ear: Tympanic membrane, ear canal and external ear normal.      Mouth/Throat:      Pharynx: No oropharyngeal exudate or posterior oropharyngeal erythema. Eyes:      General: No scleral icterus. Extraocular Movements: Extraocular movements intact. Conjunctiva/sclera: Conjunctivae normal.      Pupils: Pupils are equal, round, and reactive to light. Neck:      Thyroid: No thyroid mass or thyromegaly. Vascular: No carotid bruit. Cardiovascular:      Rate and Rhythm: Normal rate and regular rhythm. Heart sounds: Normal heart sounds. No murmur heard. Pulmonary:      Effort: Pulmonary effort is normal.      Breath sounds: Normal breath sounds. Musculoskeletal:      Right lower leg: No edema. Left lower leg: No edema. Lymphadenopathy:      Cervical: No cervical adenopathy. Neurological:      General: No focal deficit present. Mental Status: She is alert and oriented to person, place, and time. Psychiatric:         Mood and Affect: Mood normal.         Behavior: Behavior normal. Behavior is cooperative. ASSESSMENT/PLAN:    Problem List Items Addressed This Visit       Anxiety and depression      [facility]Sight results reviewed with patient. Wean off of Lexapro and start Effexor ER 37.5 mg daily x1 week then 75 mg daily. Referral placed to Dr. Kleber Mahmood to help with medication management. Patient is also going to look for a psychologist in her area.          Relevant Medications    venlafaxine (EFFEXOR XR) 37.5 MG extended release capsule    venlafaxine (EFFEXOR XR) 75 MG extended release capsule (Start on 11/18/2022)    Other Relevant Orders    External Referral To Psychology    Chronic pain disorder - Primary     Patient continues to struggle with pain. She is unable to take NSAIDs due to chronic kidney disease. She has minimal improvement on gabapentin and prednisone. She has not had improvement in pain with Butrans or Lonni Perish unfortunately no longer checks narcotics. Trial of Percocet 5/325 mg 1/2-1 3 times daily as needed for pain. Discussed safety in terms of storage of narcotics, particularly since her son has a history of substance abuse. Patient's daughter, who is an RN, is present for discussion today. Medication safe has been discussed. Small amount provided to patient she will call for refill. Relevant Medications    venlafaxine (EFFEXOR XR) 37.5 MG extended release capsule    venlafaxine (EFFEXOR XR) 75 MG extended release capsule (Start on 11/18/2022)    DDD (degenerative disc disease), lumbar     Patient continues to struggle with pain. She is unable to take NSAIDs due to chronic kidney disease. She has minimal improvement on gabapentin and prednisone. She has not had improvement in pain with Butrans or Lonni Perish unfortunately no longer checks narcotics. Trial of Percocet 5/325 mg 1/2-1 3 times daily as needed for pain. Discussed safety in terms of storage of narcotics, particularly since her son has a history of substance abuse. Patient's daughter, who is an RN, is present for discussion today. Medication safe has been discussed. Small amount provided to patient she will call for refill. Essential hypertension     Blood pressure is elevated today but sounds like it has been better at home. Did see Dr. Corina Shoemaker earlier this week who did not make any changes to her medication. Stage 3b chronic kidney disease ([facility] [address]      Has reestablished with Dr. Corina Shoemaker.             Current Outpatient Medications Medication Sig Dispense Refill    venlafaxine (EFFEXOR XR) 37.5 MG extended release capsule Take 1 capsule by mouth daily for 7 days 7 capsule 0    [START ON 11/18/2022] venlafaxine (EFFEXOR XR) 75 MG extended release capsule Take 2 capsules by mouth daily 30 capsule 2    metoprolol succinate (TOPROL XL) 100 MG extended release tablet Take 1 tablet by mouth daily 90 tablet 3    lamoTRIgine (LAMICTAL) 100 MG tablet TAKE 1 TABLET TWICE A  tablet 3    spironolactone (ALDACTONE) 25 MG tablet TAKE 1 TABLET BY MOUTH EVERY DAY (Patient not taking: Reported on 11/4/2022) 90 tablet 1    gabapentin (NEURONTIN) 300 MG capsule TAKE 1 CAPSULE NIGHTLY 90 capsule 1    rosuvastatin (CRESTOR) 20 MG tablet Take 1 tablet by mouth daily 90 tablet 3    omeprazole (PRILOSEC) 20 MG delayed release capsule Take 1 capsule by mouth 2 times daily as needed (nauease and GI upset) = 180 capsule 3    telmisartan (MICARDIS) 80 MG tablet TAKE 1 TABLET DAILY 90 tablet 1    metFORMIN (GLUCOPHAGE-XR) 500 MG extended release tablet TAKE 1 TABLET TWICE A DAY WITH MEALS 180 tablet 3    JARDIANCE 25 MG tablet Take 25 mg by mouth daily 90 tablet 3    amLODIPine (NORVASC) 10 MG tablet Take 1 tablet by mouth daily 90 tablet 3    vitamin D (ERGOCALCIFEROL) 1.25 MG (38155 UT) CAPS capsule TAKE 1 CAPSULE ONCE A WEEK 12 capsule 3    predniSONE (DELTASONE) 5 MG tablet TAKE 1 TABLET DAILY 90 tablet 3    blood glucose monitor kit and supplies Check sugars qd and prn 1 kit 0    blood glucose monitor strips Test 1 times a day & as needed for symptoms of irregular blood glucose. 100 strip 3    Lancets MISC 1 each by Does not apply route daily 100 each 5    niacin 500 MG extended release capsule Take 500 mg by mouth nightly      CPAP Machine MISC by Does not apply route       No current facility-administered medications for this visit. Return in about 6 weeks (around 12/16/2022) for Effexor.     30 minutes spent in face-to-face contact with patient and her daughter today reviewing GeneSight and discussing treatment options.

## 2022-11-04 NOTE — PROGRESS NOTES
Medicare Annual Wellness Visit    Fortino Barreto is here for Medicare AWV    Assessment & Plan   Medicare annual wellness visit, subsequent    Recommendations for Preventive Services Due: see orders and patient instructions/AVS.  Recommended screening schedule for the next 5-10 years is provided to the patient in written form: see Patient Instructions/AVS.     No follow-ups on file. Subjective       Patient's complete Health Risk Assessment and screening values have been reviewed and are found in Flowsheets. The following problems were reviewed today and where indicated follow up appointments were made and/or referrals ordered. Positive Risk Factor Screenings with Interventions:    Fall Risk:  Do you feel unsteady or are you worried about falling? : (!) yes  2 or more falls in past year?: (!) yes  Fall with injury in past year?: no   Fall Risk Interventions:    Home safety tips provided     Depression:  PHQ-2 Score: 6  PHQ-9 Total Score: 12    Severity:1-4 = minimal depression, 5-9 = mild depression, 10-14 = moderate depression, 15-19 = moderately severe depression, 20-27 = severe depression  Depression Interventions:  Seeing PCP today.           General Health and ACP:  General  In general, how would you say your health is?: (!) Poor (due to pain)  In the past 7 days, have you experienced any of the following: New or Increased Pain, New or Increased Fatigue, Loneliness, Social Isolation, Stress or Anger?: No  Do you get the social and emotional support that you need?: Yes  Do you have a Living Will?: Yes    Advance Directives       Power of  Living Will ACP-Advance Directive ACP-Power of     Not on File Not on File Not on File Not on File          General Health Risk Interventions:  No Living Will: ACP documents already completed- patient asked to provide copy to the office    Health Habits/Nutrition:  Physical Activity: Inactive    Days of Exercise per Week: 0 days    Minutes of Exercise per Session: 0 min     Have you lost any weight without trying in the past 3 months?: No  Body mass index: (!) 30.05  Have you seen the dentist within the past year?: (!) No  Health Habits/Nutrition Interventions:  Dental exam overdue:  patient encouraged to make appointment with his/her dentist     Safety:  Do you have working smoke detectors?: Yes  Do you have any tripping hazards - loose or unsecured carpets or rugs?: (!) Yes  Do you have any tripping hazards - clutter in doorways, halls, or stairs?: No  Do you have either shower bars, grab bars, non-slip mats or non-slip surfaces in your shower or bathtub?: (!) No  Do all of your stairways have a railing or banister?: Yes  Do you always fasten your seatbelt when you are in a car?: Yes  Safety Interventions:  Home safety tips provided    ADLs:  In the past 7 days, did you need help from others to perform any of the following everyday activities: Eating, dressing, grooming, bathing, toileting, or walking/balance?: No  In the past 7 days, did you need help from others to take care of any of the following: Laundry, housekeeping, banking/finances, shopping, telephone use, food preparation, transportation, or taking medications?: (!) Yes  Select all that apply: [employer], Housekeeping, Transportation ( and daughter help)  ADL Interventions:  Patient declines any further evaluation/treatment for this issue          Objective   Vitals:    11/04/22 1448   BP: (!) 140/70   Temp: (!) 125.6 °F (52 °C)   SpO2: 97%   Weight: 186 lb 3.2 oz (84.5 kg)   Height: 5' 6\" (1.676 m)      Body mass index is 30.05 kg/m². Allergies   Allergen Reactions    Codeine Nausea And Vomiting    Compazine [Prochlorperazine Maleate] Shortness Of Breath     dyspnea    Morphine Nausea And Vomiting     N&V     Prior to Visit Medications    Medication Sig Taking?  Authorizing Provider   metoprolol succinate (TOPROL XL) 100 MG extended release tablet Take 1 tablet by mouth daily Yes Jeanie Vuong DO   lamoTRIgine (LAMICTAL) 100 MG tablet TAKE 1 TABLET TWICE A DAY Yes Jeanie Vuong DO   gabapentin (NEURONTIN) 300 MG capsule TAKE 1 CAPSULE NIGHTLY Yes Jeanie Vuong DO   rosuvastatin (CRESTOR) 20 MG tablet Take 1 tablet by mouth daily Yes Jeanie Vuong DO   omeprazole (PRILOSEC) 20 MG delayed release capsule Take 1 capsule by mouth 2 times daily as needed (nauease and GI upset) = Yes Jeanie Vuong DO   telmisartan (MICARDIS) 80 MG tablet TAKE 1 TABLET DAILY Yes Jeanie Vuong DO   metFORMIN (GLUCOPHAGE-XR) 500 MG extended release tablet TAKE 1 TABLET TWICE A DAY WITH MEALS Yes Jeanie Vuong DO   JARDIANCE 25 MG tablet Take 25 mg by mouth daily Yes Jeanie Vuong DO   amLODIPine (NORVASC) 10 MG tablet Take 1 tablet by mouth daily Yes Jeanie Vuong DO   vitamin D (ERGOCALCIFEROL) 1.25 MG (92209 UT) CAPS capsule TAKE 1 CAPSULE ONCE A WEEK Yes Jeanie Vuong DO   predniSONE (DELTASONE) 5 MG tablet TAKE 1 TABLET DAILY Yes Jeanie Vuong DO   blood glucose monitor kit and supplies Check sugars qd and prn Yes Jeanie Vuong DO   blood glucose monitor strips Test 1 times a day & as needed for symptoms of irregular blood glucose.  Yes Jeanie Vuong DO   Lancets MISC 1 each by Does not apply route daily Yes Jeanie Vuong DO   niacin 500 MG extended release capsule Take 500 mg by mouth nightly Yes Historical Provider, MD   CPAP Machine MISC by Does not apply route Yes Historical Provider, MD   venlafaxine (EFFEXOR XR) 37.5 MG extended release capsule Take 1 capsule by mouth daily for 7 days  Jeanie Vuong DO   venlafaxine (EFFEXOR XR) 75 MG extended release capsule Take 2 capsules by mouth daily  Jeanie Vuong DO   spironolactone (ALDACTONE) 25 MG tablet TAKE 1 TABLET BY MOUTH EVERY DAY  Patient not taking: Reported on 11/4/2022  DO Sussy SolomonTerebecca (Including outside providers/suppliers regularly involved in providing care):   Patient Care Team:  Arnol Villalobos DO as PCP - General (Internal Medicine)  Arnol Villalobos DO as PCP - [facility] Empaneled Provider     Reviewed and updated this visit:  Tobacco  Allergies  Meds  Med Hx  Surg Hx  Soc Hx  Fam Hx                 This encounter was performed under , [facility], [facility], direct supervision, 11/4/2022.

## 2022-11-04 NOTE — PATIENT INSTRUCTIONS
Wean off of Lexapro/start venlafaxine:  Week 1 take Lexapro 20 mg daily for 1 week -> week 2 take Lexapro 20 mg 1/2 pill daily for 1 week -> stop  When you are down to Lexapro 1/2 pill daily, start venlafaxine 37.5 mg daily for 1 week then increase to 75 mg daily.

## 2022-11-04 NOTE — PATIENT INSTRUCTIONS
Personalized Preventive Plan for Russell Aaron - 11/4/2022  Medicare offers a range of preventive health benefits. Some of the tests and screenings are paid in full while other may be subject to a deductible, co-insurance, and/or copay. Some of these benefits include a comprehensive review of your medical history including lifestyle, illnesses that may run in your family, and various assessments and screenings as appropriate. After reviewing your medical record and screening and assessments performed today your provider may have ordered immunizations, labs, imaging, and/or referrals for you. A list of these orders (if applicable) as well as your Preventive Care list are included within your After Visit Summary for your review. Other Preventive Recommendations:    A preventive eye exam performed by an eye specialist is recommended every 1-2 years to screen for glaucoma; cataracts, macular degeneration, and other eye disorders. A preventive dental visit is recommended every 6 months. Try to get at least 150 minutes of exercise per week or 10,000 steps per day on a pedometer . Order or download the FREE \"Exercise & Physical Activity: Your Everyday Guide\" from [employer]. Call 9-933.508.6133 or search [employer] online. You need 5226-0935 mg of calcium and 9084-0529 IU of vitamin D per day. It is possible to meet your calcium requirement with diet alone, but a vitamin D supplement is usually necessary to meet this goal.  When exposed to the sun, use a sunscreen that protects against both UVA and UVB radiation with an SPF of 30 or greater. Reapply every 2 to 3 hours or after sweating, drying off with a towel, or swimming. Always wear a seat belt when traveling in a car. Always wear a helmet when riding a bicycle or motorcycle.

## 2022-11-13 NOTE — ASSESSMENT & PLAN NOTE
Blood pressure is elevated today but sounds like it has been better at home. Did see Dr. Diana Cordova earlier this week who did not make any changes to her medication.

## 2022-11-13 NOTE — ASSESSMENT & PLAN NOTE
Patient continues to struggle with pain. She is unable to take NSAIDs due to chronic kidney disease. She has minimal improvement on gabapentin and prednisone. She has not had improvement in pain with Butrans or Syed Leanne unfortunately no longer checks narcotics. Trial of Percocet 5/325 mg 1/2-1 3 times daily as needed for pain. Discussed safety in terms of storage of narcotics, particularly since her son has a history of substance abuse. Patient's daughter, who is an RN, is present for discussion today. Medication safe has been discussed. Small amount provided to patient she will call for refill.

## 2022-11-13 NOTE — ASSESSMENT & PLAN NOTE
Patient continues to struggle with pain. She is unable to take NSAIDs due to chronic kidney disease. She has minimal improvement on gabapentin and prednisone. She has not had improvement in pain with Butrans or Kortney Stevens unfortunately no longer checks narcotics. Trial of Percocet 5/325 mg 1/2-1 3 times daily as needed for pain. Discussed safety in terms of storage of narcotics, particularly since her son has a history of substance abuse. Patient's daughter, who is an RN, is present for discussion today. Medication safe has been discussed. Small amount provided to patient she will call for refill.

## 2022-11-13 NOTE — ASSESSMENT & PLAN NOTE
[facility] results reviewed with patient. Wean off of Lexapro and start Effexor ER 37.5 mg daily x1 week then 75 mg daily. Referral placed to Dr. Benny Wynne to help with medication management. Patient is also going to look for a psychologist in her area. None known

## 2022-11-16 RX ORDER — GABAPENTIN 300 MG/1
CAPSULE ORAL
Qty: 90 CAPSULE | Refills: 3 | Status: SHIPPED | OUTPATIENT
Start: 2022-11-16 | End: 2023-05-15

## 2022-11-18 ENCOUNTER — TELEPHONE (OUTPATIENT)
Dept: INTERNAL MEDICINE CLINIC | Age: 72
End: 2022-11-18

## 2022-11-18 DIAGNOSIS — M51.36 DDD (DEGENERATIVE DISC DISEASE), LUMBAR: ICD-10-CM

## 2022-11-18 DIAGNOSIS — G89.4 CHRONIC PAIN DISORDER: ICD-10-CM

## 2022-11-18 RX ORDER — OXYCODONE HYDROCHLORIDE AND ACETAMINOPHEN 5; 325 MG/1; MG/1
.5-1 TABLET ORAL EVERY 8 HOURS PRN
Qty: 60 TABLET | Refills: 0 | Status: SHIPPED | OUTPATIENT
Start: 2022-11-18 | End: 2022-12-18

## 2022-12-01 DIAGNOSIS — I10 ESSENTIAL HYPERTENSION: ICD-10-CM

## 2022-12-01 RX ORDER — TELMISARTAN 80 MG/1
TABLET ORAL
Qty: 90 TABLET | Refills: 3 | Status: SHIPPED | OUTPATIENT
Start: 2022-12-01

## 2022-12-01 NOTE — TELEPHONE ENCOUNTER
No future visits.     Past Visits    Date Provider Specialty Visit Type Primary Dx   11/04/2022 Corinne Costa, DO Internal Medicine Office Visit Medicare annual wellness visit, subsequent   11/04/2022 Corinne Costa, DO Internal Medicine Office Visit Chronic pain disorder   10/04/2022 Corinne Costa, DO Internal Medicine Office Visit Memory loss or impairment

## 2022-12-13 DIAGNOSIS — F41.9 ANXIETY AND DEPRESSION: ICD-10-CM

## 2022-12-13 DIAGNOSIS — F32.A ANXIETY AND DEPRESSION: ICD-10-CM

## 2022-12-13 RX ORDER — VENLAFAXINE HYDROCHLORIDE 75 MG/1
CAPSULE, EXTENDED RELEASE ORAL
Qty: 30 CAPSULE | Refills: 5 | OUTPATIENT
Start: 2022-12-13

## 2022-12-13 NOTE — TELEPHONE ENCOUNTER
No future appts with PCP.     Future Appointments    Encounter Information    Provider Department Appt Notes   1/20/2023 Bao Tesfaye MD [facility] Psychiatry new patient ref by Dr. Felton Luis     Past Visits    Date Provider Specialty Visit Type Primary Dx   11/04/2022 Fermín Tong DO Internal Medicine Office Visit Medicare annual wellness visit, subsequent

## 2022-12-22 RX ORDER — PREDNISONE 1 MG/1
TABLET ORAL
Qty: 90 TABLET | Refills: 3 | Status: SHIPPED | OUTPATIENT
Start: 2022-12-22

## 2022-12-22 NOTE — TELEPHONE ENCOUNTER
Future Appointments    Encounter Information    Provider Department Appt Notes   1/20/2023 Nilda Delvalle MD [facility] Psychiatry new patient ref by Dr. Ella Clement     Past Visits    Date Provider Specialty Visit Type Primary Dx   11/04/2022 Collin Walsh DO Internal Medicine Office Visit Medicare annual wellness visit, subsequent

## 2022-12-29 RX ORDER — ERGOCALCIFEROL 1.25 MG/1
CAPSULE ORAL
Qty: 12 CAPSULE | Refills: 3 | Status: SHIPPED | OUTPATIENT
Start: 2022-12-29

## 2022-12-29 NOTE — TELEPHONE ENCOUNTER
Future Appointments    Encounter Information    Provider Department Appt Notes   1/20/2023 Poppy Yeung MD [facility] Psychiatry new patient ref by Dr. Rachel Nava     Past Visits    Date Provider Specialty Visit Type Primary Dx   11/04/2022 Angelica Seymour, DO Internal Medicine Office Visit Medicare annual wellness visit, subsequent

## 2022-12-30 ENCOUNTER — TELEPHONE (OUTPATIENT)
Dept: INTERNAL MEDICINE CLINIC | Age: 72
End: 2022-12-30

## 2022-12-30 DIAGNOSIS — G89.4 CHRONIC PAIN DISORDER: ICD-10-CM

## 2022-12-30 DIAGNOSIS — M51.36 DDD (DEGENERATIVE DISC DISEASE), LUMBAR: ICD-10-CM

## 2022-12-30 RX ORDER — OXYCODONE HYDROCHLORIDE AND ACETAMINOPHEN 5; 325 MG/1; MG/1
.5-1 TABLET ORAL EVERY 8 HOURS PRN
Qty: 60 TABLET | Refills: 0 | Status: SHIPPED | OUTPATIENT
Start: 2022-12-30 | End: 2023-01-29

## 2022-12-30 NOTE — TELEPHONE ENCOUNTER
I sent in the 60 pills. Since she takes fairly routinely, she needs to see Dr. Christi Haynes by Feb 8 or earlier. Schedule her for a pain medication visit.

## 2022-12-30 NOTE — TELEPHONE ENCOUNTER
Patient requests refill for oxycodone-acetaminophen (PERCOCET) 5-325 MG tablet. She has 6 pills left. Patient aware PCP is not in the office until next week. She uses [facility] pharmacy on [address] in [place], [place].

## 2023-01-04 ENCOUNTER — TELEPHONE (OUTPATIENT)
Dept: INTERNAL MEDICINE CLINIC | Age: 73
End: 2023-01-04

## 2023-01-04 NOTE — TELEPHONE ENCOUNTER
----- Message from [employer] Cynthia [phone] sent at 1/4/2023 10:58 AM EST -----  Subject: Refill Request    QUESTIONS  Name of Medication? oxyCODONE-acetaminophen (PERCOCET) 5-325 MG per tablet  Patient-reported dosage and instructions? 3 pills per day prescribed but   normally takes 2 a day  How many days do you have left? 1  Preferred Pharmacy? [id number]  Pharmacy phone number (if available)? [phone]  Additional Information for Provider? The patient has 1 day left for this   medication and is asking for refills. ---------------------------------------------------------------------------  --------------  Melvin CHRISTINE  What is the best way for the office to contact you? OK to leave message on   voicemail  Preferred Call Back Phone Number? [phone]  ---------------------------------------------------------------------------  --------------  SCRIPT ANSWERS  Relationship to Patient?  Self

## 2023-01-04 NOTE — TELEPHONE ENCOUNTER
Patient advised that refill was sent on 12/30. Patient advised to reach out to pharmacy to check on refill status.

## 2023-01-16 DIAGNOSIS — F32.A ANXIETY AND DEPRESSION: ICD-10-CM

## 2023-01-16 DIAGNOSIS — F41.9 ANXIETY AND DEPRESSION: ICD-10-CM

## 2023-01-16 RX ORDER — VENLAFAXINE HYDROCHLORIDE 150 MG/1
150 CAPSULE, EXTENDED RELEASE ORAL DAILY
Qty: 90 CAPSULE | Refills: 1 | Status: SHIPPED | OUTPATIENT
Start: 2023-01-16

## 2023-01-16 NOTE — TELEPHONE ENCOUNTER
Please let patient know that I increased the dose on her venlafaxine from 75 mg 2 capsules daily to 150 mg 1 capsule daily.

## 2023-02-06 ENCOUNTER — TELEPHONE (OUTPATIENT)
Dept: INTERNAL MEDICINE CLINIC | Age: 73
End: 2023-02-06

## 2023-02-06 NOTE — TELEPHONE ENCOUNTER
Patient was transferred in from [facility] from [facility] with dizziness and weakness. Has been happening for about 6 weeks- thinks this may be due to new medication- percocet. Patient was last seen 11/04/2022 was due back in 6 weeks and cancelled her appt and never rescheduled. Is wanting to be seen. No open visit until March 29, other than Same day appts.

## 2023-02-06 NOTE — TELEPHONE ENCOUNTER
Not sure how you feel about putting her into a same day slot, just because she's canceled her appointments from us recently. Dizzy for 6 weeks, assuming she wants seen asap because of lack of following up, but entirely up to you.

## 2023-02-08 DIAGNOSIS — G89.4 CHRONIC PAIN DISORDER: ICD-10-CM

## 2023-02-08 DIAGNOSIS — M51.36 DDD (DEGENERATIVE DISC DISEASE), LUMBAR: ICD-10-CM

## 2023-02-08 RX ORDER — OXYCODONE HYDROCHLORIDE AND ACETAMINOPHEN 5; 325 MG/1; MG/1
.5-1 TABLET ORAL EVERY 8 HOURS PRN
Qty: 60 TABLET | Refills: 0 | Status: SHIPPED | OUTPATIENT
Start: 2023-02-08 | End: 2023-03-10

## 2023-02-08 NOTE — TELEPHONE ENCOUNTER
----- Message from [place] sent at 2/8/2023 12:52 PM EST -----  Subject: Refill Request    QUESTIONS  Name of Medication? oxyCODONE-acetaminophen (PERCOCET) 5-325 MG per tablet  Patient-reported dosage and instructions? 5-325 MG, three times daily   How many days do you have left? 5  Preferred [facility]acy? [id number]  Pharmacy phone number (if available)? [phone]  ---------------------------------------------------------------------------  --------------  CALL BACK INFO  What is the best way for the office to contact you? OK to leave message on   voicemail  Preferred Call Back Phone Number? [phone]  ---------------------------------------------------------------------------  --------------  SCRIPT ANSWERS  Relationship to Patient?  Self

## 2023-02-08 NOTE — TELEPHONE ENCOUNTER
----- Message from [place] sent at 2/8/2023 12:52 PM EST -----  Subject: Refill Request    QUESTIONS  Name of Medication? oxyCODONE-acetaminophen (PERCOCET) 5-325 MG per tablet  Patient-reported dosage and instructions? 5-325 MG, three times daily   How many days do you have left? 5  Preferred [facility]acy? [id number]  Pharmacy phone number (if available)? [phone]  ---------------------------------------------------------------------------  --------------  CALL BACK INFO  What is the best way for the office to contact you? OK to leave message on   voicemail  Preferred Call Back Phone Number? [phone]  ---------------------------------------------------------------------------  --------------  SCRIPT ANSWERS  Relationship to Patient?  Self

## 2023-02-09 DIAGNOSIS — E11.9 TYPE 2 DIABETES MELLITUS WITHOUT COMPLICATION, WITHOUT LONG-TERM CURRENT USE OF INSULIN (HCC): ICD-10-CM

## 2023-02-09 RX ORDER — METFORMIN HYDROCHLORIDE 500 MG/1
TABLET, EXTENDED RELEASE ORAL
Qty: 180 TABLET | Refills: 1 | Status: SHIPPED | OUTPATIENT
Start: 2023-02-09

## 2023-02-12 NOTE — PROGRESS NOTES
Patient: Leonora Harper is a 67 y.o. female who presents today with the following Chief Complaint(s):  Chief Complaint   Patient presents with    Dizziness     X6 weeks, foggy, off balance, shaky and weak. Losing  strength and motor functions to hands every so often. HPI    Here today for acute visit. States that her BP has home has been good. HR has been in the 40's. Will suddenly become weak and SOB. Is c/o ongoing shakiness. Worse when she is holding things. Has been dropping things. Has been taking Percocet about 2/day. Does seem to be helping with her pain a little. Making pain more tolearable. Does have a spinal stimulator but is not sure that she is getting clear benefit. Is wondering if she should have the stimulator removed. Is also due for labs for follow-up on diabetes and cholesterol and high blood pressure.     Allergies   Allergen Reactions    Codeine Nausea And Vomiting    Compazine [Prochlorperazine Maleate] Shortness Of Breath     dyspnea    Morphine Nausea And Vomiting     N&V      Past Medical History:   Diagnosis Date    Back pain, chronic     Depression     Joint pain     Seizures ([facility] [address]     Sleep apnea       Past Surgical History:   Procedure Laterality Date    LUMBAR FUSION  2016    PARTIAL HYSTERECTOMY (CERVIX NOT REMOVED)  2002    SPINAL CORD STIMULATOR SURGERY      Transmit receive head coil only      Social History     Socioeconomic History    Marital status:      Spouse name: Not on file    Number of children: Not on file    Years of education: Not on file    Highest education level: Not on file   Occupational History    Occupation: retired   Tobacco Use    Smoking status: Never    Smokeless tobacco: Never   Vaping Use    Vaping Use: Never used   Substance and Sexual Activity    Alcohol use: No    Drug use: No    Sexual activity: Not on file   Other Topics Concern    Not on file   Social History Narrative    Not on file     Social Determinants of Health     Financial Resource Strain: Low Risk     Difficulty of Paying Living Expenses: Not hard at all   Food Insecurity: No Food Insecurity    Worried About [facility] in the Last Year: Never true    Ran Out of Food in the Last Year: Never true   Transportation Needs: Unknown    Lack of Transportation (Medical): Not on file    Lack of Transportation (Non-Medical): No   Physical Activity: Inactive    Days of Exercise per Week: 0 days    Minutes of Exercise per Session: 0 min   Stress: Not on file   Social Connections: Not on file   Intimate Partner Violence: Not on file   Housing Stability: Unknown    Unable to Pay for Housing in the Last Year: Not on file    Number of Joanne in the Last Year: Not on file    Unstable Housing in the Last Year: No     Family History   Problem Relation Age of Onset    Heart Failure Mother     COPD Father         Outpatient Medications Prior to Visit   Medication Sig Dispense Refill    metFORMIN (GLUCOPHAGE-XR) 500 MG extended release tablet TAKE 1 TABLET TWICE A DAY WITH MEALS 180 tablet 1    oxyCODONE-acetaminophen (PERCOCET) 5-325 MG per tablet Take 0.5-1 tablets by mouth every 8 hours as needed for Pain for up to 30 days.  60 tablet 0    venlafaxine (EFFEXOR XR) 150 MG extended release capsule Take 1 capsule by mouth daily 90 capsule 1    vitamin D (ERGOCALCIFEROL) 1.25 MG (44571 UT) CAPS capsule TAKE 1 CAPSULE ONCE A WEEK 12 capsule 3    predniSONE (DELTASONE) 5 MG tablet TAKE 1 TABLET DAILY 90 tablet 3    telmisartan (MICARDIS) 80 MG tablet TAKE 1 TABLET DAILY 90 tablet 3    gabapentin (NEURONTIN) 300 MG capsule TAKE 1 CAPSULE NIGHTLY 90 capsule 3    metoprolol succinate (TOPROL XL) 100 MG extended release tablet Take 1 tablet by mouth daily 90 tablet 3    lamoTRIgine (LAMICTAL) 100 MG tablet TAKE 1 TABLET TWICE A  tablet 3    spironolactone (ALDACTONE) 25 MG tablet TAKE 1 TABLET BY MOUTH EVERY DAY 90 tablet 1    rosuvastatin (CRESTOR) 20 MG tablet Take 1 tablet by mouth daily 90 tablet 3    omeprazole (PRILOSEC) 20 MG delayed release capsule Take 1 capsule by mouth 2 times daily as needed (nauease and GI upset) = 180 capsule 3    JARDIANCE 25 MG tablet Take 25 mg by mouth daily 90 tablet 3    amLODIPine (NORVASC) 10 MG tablet Take 1 tablet by mouth daily 90 tablet 3    blood glucose monitor kit and supplies Check sugars qd and prn 1 kit 0    blood glucose monitor strips Test 1 times a day & as needed for symptoms of irregular blood glucose. 100 strip 3    Lancets MISC 1 each by Does not apply route daily 100 each 5    niacin 500 MG extended release capsule Take 500 mg by mouth nightly      CPAP Machine MISC by Does not apply route       No facility-administered medications prior to visit. Patient'spast medical history, surgical history, family history, medications,  and allergies  were all reviewed and updated as appropriate today. Review of Systems   Constitutional:  Positive for fatigue. Respiratory:  Positive for shortness of breath. Neurological:  Positive for light-headedness. BP (!) 150/78   Pulse 57   Ht 5' 6\" (1.676 m)   Wt 182 lb 9.6 oz (82.8 kg)   SpO2 96%   BMI 29.47 kg/m²   Physical Exam  Vitals and nursing note reviewed. Constitutional:       Appearance: She is well-developed. She is not toxic-appearing. HENT:      Head: Normocephalic. Right Ear: Tympanic membrane, ear canal and external ear normal.      Left Ear: Tympanic membrane, ear canal and external ear normal.      Mouth/Throat:      Pharynx: No oropharyngeal exudate or posterior oropharyngeal erythema. Eyes:      General: No scleral icterus. Extraocular Movements: Extraocular movements intact. Conjunctiva/sclera: Conjunctivae normal.      Pupils: Pupils are equal, round, and reactive to light. Neck:      Thyroid: No thyroid mass or thyromegaly. Vascular: No carotid bruit. Cardiovascular:      Rate and Rhythm: Normal rate and regular rhythm. Heart sounds: Normal heart sounds. No murmur heard. Pulmonary:      Effort: Pulmonary effort is normal.      Breath sounds: Normal breath sounds. Musculoskeletal:      Right lower leg: No edema. Left lower leg: No edema. Lymphadenopathy:      Cervical: No cervical adenopathy. Neurological:      General: No focal deficit present. Mental Status: She is alert and oriented to person, place, and time. Motor: Tremor present. No weakness. Coordination: Coordination is intact. Deep Tendon Reflexes:      Reflex Scores:       Bicep reflexes are 2+ on the right side and 2+ on the left side. Comments: Ambulates with walker   Psychiatric:         Mood and Affect: Mood normal.         Behavior: Behavior normal. Behavior is cooperative. ASSESSMENT/PLAN:    Problem List Items Addressed This Visit       Anxiety and depression      Continue Effexor  mg daily. Atrial fibrillation [facility])      Patient had 1 single episode of A-fib related to acute illness. Was seen by Dr. Donaldo Hector for cardiology and had echocardiogram and event monitor without evidence of A-fib. Bradycardia      Likely secondary to metoprolol. Decrease metoprolol to 50 mg from 100 mg daily. Check 14-day event monitor. Relevant Orders    Cardiac event monitor    Cervical radiculopathy      Continue Percocet as needed for pain. Has failed all other modalities and is not eligible to take NSAIDs due to chronic kidney disease. Did review with patient today that I suspect some of her upper arm shaking and tremors may be related to cervical spinal stenosis. Chronic pain disorder      Unable to take NSAIDs due to chronic kidney disease (which is likely related to chronic NSAID use and hypertension). Has not had relief with daily prednisone or gabapentin. Does have a spinal stimulator for lumbar spinal stenosis without improvement in pain.   Is taking Percocet 5/325 mg 1 to 2 pills daily with some relief in pain. DDD (degenerative disc disease), lumbar      Has noticed some improvement in pain with Percocet. Does have spinal stimulator and is wondering about having it removed as she does not feel that it helps with her pain control. Discussed risks and benefits of removal of device. Essential hypertension      Patient reports that her blood pressure is well controlled at home. Is following with Dr. Cathy Prado for nephrology. Because of bradycardia, will have her decrease metoprolol ER from 100 mg daily to 50 mg daily. Currently on Micardis 80 mg daily, Aldactone 25 mg daily, and amlodipine 10 mg daily in addition to metoprolol. Relevant Orders    CBC with Auto Differential    Hyperlipidemia LDL goal <70      Remains on Crestor 10 mg daily. Check lipid panel. Relevant Orders    Comprehensive Metabolic Panel    Lipid Panel    TSH with Reflex    Lightheadedness - Primary      Suspect related to bradycardia. Decrease metoprolol ER from 100 mg daily to 50 mg daily. 14-day event monitor ordered. Mild anemia     Check CBC. Nonrheumatic mitral valve regurgitation      Patient with moderate mitral regurgitation on echocardiogram in November 2021. Likely needs repeat echocardiogram and will discuss with patient at return visit. CANDELARIA (obstructive sleep apnea)      Compliant with CPAP. Pulmonary nodule      Patient forgot to schedule CT chest that was ordered in August 2022. CT reordered today for follow-up on pulmonary nodule initially found on CT of the chest in November 2021 during acute hospitalization. Relevant Orders    CT CHEST WO CONTRAST    Seizures ([address]     Patient had 2 seizures in close proximity during hospitalization for acute illness in 2021. She has not had any seizures since that time. She has seen neurology without answers to because of her underlying seizures.   Currently on Lamictal 100 mg twice daily and is wondering about discontinuing Lamictal.  We will have patient wean Lamictal down to 100 mg daily for 3 months and if she remains seizure-free may discontinue Lamictal after that. Shortness of breath      Suspect related to bradycardia but does have known risk factors for CAD and was found to have mild coronary artery calcifications on chest CT scan done in January 2022. Check 14-day event monitor and nuclear stress test (chemical as she is unable to walk on treadmill). Relevant Orders    Cardiac event monitor    Stress test, myoview    Stage 3b chronic kidney disease ([facility] [address]      Following with Dr. Aamir Ordonez for nephrology. Relevant Orders    CBC with Auto Differential    Comprehensive Metabolic Panel    Type 2 diabetes mellitus without complication, without long-term current use of insulin ([facility])     Remains on metformin ER 1000 mg daily and Jardiance 25 mg daily. Check hemoglobin A1c. Relevant Orders    Hemoglobin A1C    Microalbumin / Creatinine Urine Ratio    Vitamin D deficiency      Remains on weekly ergocalciferol. Check vitamin D level. Relevant Orders    Vitamin D 25 Hydroxy     Other Visit Diagnoses       Fatigue, unspecified type        Relevant Orders    Vitamin B12 & Folate            Current Outpatient Medications   Medication Sig Dispense Refill    metFORMIN (GLUCOPHAGE-XR) 500 MG extended release tablet TAKE 1 TABLET TWICE A DAY WITH MEALS 180 tablet 1    oxyCODONE-acetaminophen (PERCOCET) 5-325 MG per tablet Take 0.5-1 tablets by mouth every 8 hours as needed for Pain for up to 30 days.  60 tablet 0    venlafaxine (EFFEXOR XR) 150 MG extended release capsule Take 1 capsule by mouth daily 90 capsule 1    vitamin D (ERGOCALCIFEROL) 1.25 MG (90597 UT) CAPS capsule TAKE 1 CAPSULE ONCE A WEEK 12 capsule 3    predniSONE (DELTASONE) 5 MG tablet TAKE 1 TABLET DAILY 90 tablet 3    telmisartan (MICARDIS) 80 MG tablet TAKE 1 TABLET DAILY 90 tablet 3    gabapentin (NEURONTIN) 300 MG capsule TAKE 1 CAPSULE NIGHTLY 90 capsule 3    metoprolol succinate (TOPROL XL) 100 MG extended release tablet Take 1 tablet by mouth daily 90 tablet 3    lamoTRIgine (LAMICTAL) 100 MG tablet TAKE 1 TABLET TWICE A  tablet 3    spironolactone (ALDACTONE) 25 MG tablet TAKE 1 TABLET BY MOUTH EVERY DAY 90 tablet 1    rosuvastatin (CRESTOR) 20 MG tablet Take 1 tablet by mouth daily 90 tablet 3    omeprazole (PRILOSEC) 20 MG delayed release capsule Take 1 capsule by mouth 2 times daily as needed (nauease and GI upset) = 180 capsule 3    JARDIANCE 25 MG tablet Take 25 mg by mouth daily 90 tablet 3    amLODIPine (NORVASC) 10 MG tablet Take 1 tablet by mouth daily 90 tablet 3    blood glucose monitor kit and supplies Check sugars qd and prn 1 kit 0    blood glucose monitor strips Test 1 times a day & as needed for symptoms of irregular blood glucose. 100 strip 3    Lancets MISC 1 each by Does not apply route daily 100 each 5    niacin 500 MG extended release capsule Take 500 mg by mouth nightly      CPAP Machine MISC by Does not apply route       No current facility-administered medications for this visit. Return in about 6 weeks (around 3/28/2023) for extended.

## 2023-02-14 ENCOUNTER — OFFICE VISIT (OUTPATIENT)
Dept: INTERNAL MEDICINE CLINIC | Age: 73
End: 2023-02-14
Payer: MEDICARE

## 2023-02-14 VITALS
DIASTOLIC BLOOD PRESSURE: 78 MMHG | OXYGEN SATURATION: 96 % | BODY MASS INDEX: 29.35 KG/M2 | SYSTOLIC BLOOD PRESSURE: 150 MMHG | HEART RATE: 57 BPM | HEIGHT: 66 IN | WEIGHT: 182.6 LBS

## 2023-02-14 DIAGNOSIS — I34.0 NONRHEUMATIC MITRAL VALVE REGURGITATION: ICD-10-CM

## 2023-02-14 DIAGNOSIS — R00.1 BRADYCARDIA: ICD-10-CM

## 2023-02-14 DIAGNOSIS — E55.9 VITAMIN D DEFICIENCY: ICD-10-CM

## 2023-02-14 DIAGNOSIS — F41.9 ANXIETY AND DEPRESSION: ICD-10-CM

## 2023-02-14 DIAGNOSIS — M54.12 CERVICAL RADICULOPATHY: ICD-10-CM

## 2023-02-14 DIAGNOSIS — I48.0 PAROXYSMAL ATRIAL FIBRILLATION (HCC): ICD-10-CM

## 2023-02-14 DIAGNOSIS — I10 ESSENTIAL HYPERTENSION: ICD-10-CM

## 2023-02-14 DIAGNOSIS — R56.9 SEIZURES (HCC): ICD-10-CM

## 2023-02-14 DIAGNOSIS — M51.36 DDD (DEGENERATIVE DISC DISEASE), LUMBAR: ICD-10-CM

## 2023-02-14 DIAGNOSIS — N18.32 STAGE 3B CHRONIC KIDNEY DISEASE (HCC): ICD-10-CM

## 2023-02-14 DIAGNOSIS — R42 LIGHTHEADEDNESS: Primary | ICD-10-CM

## 2023-02-14 DIAGNOSIS — F32.A ANXIETY AND DEPRESSION: ICD-10-CM

## 2023-02-14 DIAGNOSIS — E11.9 TYPE 2 DIABETES MELLITUS WITHOUT COMPLICATION, WITHOUT LONG-TERM CURRENT USE OF INSULIN (HCC): ICD-10-CM

## 2023-02-14 DIAGNOSIS — G47.33 OSA (OBSTRUCTIVE SLEEP APNEA): ICD-10-CM

## 2023-02-14 DIAGNOSIS — E78.5 HYPERLIPIDEMIA LDL GOAL <70: ICD-10-CM

## 2023-02-14 DIAGNOSIS — R06.02 SHORTNESS OF BREATH: ICD-10-CM

## 2023-02-14 DIAGNOSIS — R91.1 PULMONARY NODULE: ICD-10-CM

## 2023-02-14 DIAGNOSIS — D64.9 MILD ANEMIA: ICD-10-CM

## 2023-02-14 DIAGNOSIS — G89.4 CHRONIC PAIN DISORDER: ICD-10-CM

## 2023-02-14 DIAGNOSIS — R53.83 FATIGUE, UNSPECIFIED TYPE: ICD-10-CM

## 2023-02-14 PROCEDURE — 1090F PRES/ABSN URINE INCON ASSESS: CPT | Performed by: INTERNAL MEDICINE

## 2023-02-14 PROCEDURE — 3077F SYST BP >= 140 MM HG: CPT | Performed by: INTERNAL MEDICINE

## 2023-02-14 PROCEDURE — G8484 FLU IMMUNIZE NO ADMIN: HCPCS | Performed by: INTERNAL MEDICINE

## 2023-02-14 PROCEDURE — 3078F DIAST BP <80 MM HG: CPT | Performed by: INTERNAL MEDICINE

## 2023-02-14 PROCEDURE — 1123F ACP DISCUSS/DSCN MKR DOCD: CPT | Performed by: INTERNAL MEDICINE

## 2023-02-14 PROCEDURE — 99214 OFFICE O/P EST MOD 30 MIN: CPT | Performed by: INTERNAL MEDICINE

## 2023-02-14 PROCEDURE — G8417 CALC BMI ABV UP PARAM F/U: HCPCS | Performed by: INTERNAL MEDICINE

## 2023-02-14 PROCEDURE — 3046F HEMOGLOBIN A1C LEVEL >9.0%: CPT | Performed by: INTERNAL MEDICINE

## 2023-02-14 PROCEDURE — 3017F COLORECTAL CA SCREEN DOC REV: CPT | Performed by: INTERNAL MEDICINE

## 2023-02-14 PROCEDURE — G8400 PT W/DXA NO RESULTS DOC: HCPCS | Performed by: INTERNAL MEDICINE

## 2023-02-14 PROCEDURE — 1036F TOBACCO NON-USER: CPT | Performed by: INTERNAL MEDICINE

## 2023-02-14 PROCEDURE — G8427 DOCREV CUR MEDS BY ELIG CLIN: HCPCS | Performed by: INTERNAL MEDICINE

## 2023-02-14 PROCEDURE — 2022F DILAT RTA XM EVC RTNOPTHY: CPT | Performed by: INTERNAL MEDICINE

## 2023-02-14 SDOH — ECONOMIC STABILITY: HOUSING INSECURITY
IN THE LAST 12 MONTHS, WAS THERE A TIME WHEN YOU DID NOT HAVE A STEADY PLACE TO SLEEP OR SLEPT IN A SHELTER (INCLUDING NOW)?: NO

## 2023-02-14 SDOH — ECONOMIC STABILITY: FOOD INSECURITY: WITHIN THE PAST 12 MONTHS, THE FOOD YOU BOUGHT JUST DIDN'T LAST AND YOU DIDN'T HAVE MONEY TO GET MORE.: NEVER TRUE

## 2023-02-14 SDOH — ECONOMIC STABILITY: INCOME INSECURITY: HOW HARD IS IT FOR YOU TO PAY FOR THE VERY BASICS LIKE FOOD, HOUSING, MEDICAL CARE, AND HEATING?: NOT HARD AT ALL

## 2023-02-14 SDOH — ECONOMIC STABILITY: FOOD INSECURITY: WITHIN THE PAST 12 MONTHS, YOU WORRIED THAT YOUR FOOD WOULD RUN OUT BEFORE YOU GOT MONEY TO BUY MORE.: NEVER TRUE

## 2023-02-14 ASSESSMENT — PATIENT HEALTH QUESTIONNAIRE - PHQ9
8. MOVING OR SPEAKING SO SLOWLY THAT OTHER PEOPLE COULD HAVE NOTICED. OR THE OPPOSITE, BEING SO FIGETY OR RESTLESS THAT YOU HAVE BEEN MOVING AROUND A LOT MORE THAN USUAL: 0
6. FEELING BAD ABOUT YOURSELF - OR THAT YOU ARE A FAILURE OR HAVE LET YOURSELF OR YOUR FAMILY DOWN: 0
1. LITTLE INTEREST OR PLEASURE IN DOING THINGS: 1
SUM OF ALL RESPONSES TO PHQ9 QUESTIONS 1 & 2: 2
SUM OF ALL RESPONSES TO PHQ QUESTIONS 1-9: 8
SUM OF ALL RESPONSES TO PHQ QUESTIONS 1-9: 8
3. TROUBLE FALLING OR STAYING ASLEEP: 2
5. POOR APPETITE OR OVEREATING: 0
SUM OF ALL RESPONSES TO PHQ QUESTIONS 1-9: 8
10. IF YOU CHECKED OFF ANY PROBLEMS, HOW DIFFICULT HAVE THESE PROBLEMS MADE IT FOR YOU TO DO YOUR WORK, TAKE CARE OF THINGS AT HOME, OR GET ALONG WITH OTHER PEOPLE: 1
9. THOUGHTS THAT YOU WOULD BE BETTER OFF DEAD, OR OF HURTING YOURSELF: 0
SUM OF ALL RESPONSES TO PHQ QUESTIONS 1-9: 8
4. FEELING TIRED OR HAVING LITTLE ENERGY: 1
2. FEELING DOWN, DEPRESSED OR HOPELESS: 1
7. TROUBLE CONCENTRATING ON THINGS, SUCH AS READING THE NEWSPAPER OR WATCHING TELEVISION: 3

## 2023-02-14 ASSESSMENT — ENCOUNTER SYMPTOMS: SHORTNESS OF BREATH: 1

## 2023-02-14 NOTE — ASSESSMENT & PLAN NOTE
Patient with moderate mitral regurgitation on echocardiogram in November 2021. Likely needs repeat echocardiogram and will discuss with patient at return visit.

## 2023-02-14 NOTE — ASSESSMENT & PLAN NOTE
Patient had 2 seizures in close proximity during hospitalization for acute illness in 2021. She has not had any seizures since that time. She has seen neurology without answers to because of her underlying seizures. Currently on Lamictal 100 mg twice daily and is wondering about discontinuing Lamictal.  We will have patient wean Lamictal down to 100 mg daily for 3 months and if she remains seizure-free may discontinue Lamictal after that.

## 2023-02-14 NOTE — ASSESSMENT & PLAN NOTE
Patient had 1 single episode of A-fib related to acute illness. Was seen by Dr. Donaldo Hector for cardiology and had echocardiogram and event monitor without evidence of A-fib.

## 2023-02-14 NOTE — ASSESSMENT & PLAN NOTE
Suspect related to bradycardia but does have known risk factors for CAD and was found to have mild coronary artery calcifications on chest CT scan done in January 2022. Check 14-day event monitor and nuclear stress test (chemical as she is unable to walk on treadmill).

## 2023-02-14 NOTE — ASSESSMENT & PLAN NOTE
Has noticed some improvement in pain with Percocet. Does have spinal stimulator and is wondering about having it removed as she does not feel that it helps with her pain control. Discussed risks and benefits of removal of device.

## 2023-02-14 NOTE — ASSESSMENT & PLAN NOTE
Continue Percocet as needed for pain. Has failed all other modalities and is not eligible to take NSAIDs due to chronic kidney disease. Did review with patient today that I suspect some of her upper arm shaking and tremors may be related to cervical spinal stenosis.

## 2023-02-14 NOTE — PATIENT INSTRUCTIONS
Decrease lamictal to 1 daily for 3 months then stop if you do not have any seizures. Decrease metoprolol to 50 mg daily (cut the 100 mg pills in half).

## 2023-02-14 NOTE — ASSESSMENT & PLAN NOTE
Unable to take NSAIDs due to chronic kidney disease (which is likely related to chronic NSAID use and hypertension). Has not had relief with daily prednisone or gabapentin. Does have a spinal stimulator for lumbar spinal stenosis without improvement in pain. Is taking Percocet 5/325 mg 1 to 2 pills daily with some relief in pain.

## 2023-02-14 NOTE — ASSESSMENT & PLAN NOTE
Patient forgot to schedule CT chest that was ordered in August 2022. CT reordered today for follow-up on pulmonary nodule initially found on CT of the chest in November 2021 during acute hospitalization.

## 2023-02-14 NOTE — ASSESSMENT & PLAN NOTE
Likely secondary to metoprolol. Decrease metoprolol to 50 mg from 100 mg daily. Check 14-day event monitor.

## 2023-02-14 NOTE — ASSESSMENT & PLAN NOTE
Patient reports that her blood pressure is well controlled at home. Is following with Dr. Keshawn Álvarez for nephrology. Because of bradycardia, will have her decrease metoprolol ER from 100 mg daily to 50 mg daily. Currently on Micardis 80 mg daily, Aldactone 25 mg daily, and amlodipine 10 mg daily in addition to metoprolol.

## 2023-02-14 NOTE — ASSESSMENT & PLAN NOTE
Suspect related to bradycardia. Decrease metoprolol ER from 100 mg daily to 50 mg daily. 14-day event monitor ordered.

## 2023-02-17 RX ORDER — AMLODIPINE BESYLATE 10 MG/1
TABLET ORAL
Qty: 90 TABLET | Refills: 3 | Status: SHIPPED | OUTPATIENT
Start: 2023-02-17

## 2023-02-27 RX ORDER — EMPAGLIFLOZIN 25 MG/1
TABLET, FILM COATED ORAL
Qty: 90 TABLET | Refills: 3 | Status: SHIPPED | OUTPATIENT
Start: 2023-02-27

## 2023-03-13 DIAGNOSIS — M51.36 DDD (DEGENERATIVE DISC DISEASE), LUMBAR: ICD-10-CM

## 2023-03-13 DIAGNOSIS — G89.4 CHRONIC PAIN DISORDER: ICD-10-CM

## 2023-03-13 RX ORDER — OXYCODONE HYDROCHLORIDE AND ACETAMINOPHEN 5; 325 MG/1; MG/1
.5-1 TABLET ORAL EVERY 8 HOURS PRN
Qty: 60 TABLET | Refills: 0 | Status: SHIPPED | OUTPATIENT
Start: 2023-03-13 | End: 2023-04-12

## 2023-03-13 NOTE — TELEPHONE ENCOUNTER
----- Message from Manda [place] sent at 3/13/2023 12:11 PM EDT -----  Subject: Refill Request    QUESTIONS  Name of Medication? oxyCODONE-acetaminophen (PERCOCET) 5-325 MG per tablet  Patient-reported dosage and instructions? 5-325 mg   How many days do you have left? 0  [id number]  Pharmacy phone number (if available)? [phone]  ---------------------------------------------------------------------------  --------------  CALL BACK INFO  What is the best way for the office to contact you? OK to leave message on   voicemail  Preferred Call Back Phone Number? [phone]  ---------------------------------------------------------------------------  --------------  SCRIPT ANSWERS  Relationship to Patient?  Self

## 2023-03-23 ENCOUNTER — HOSPITAL ENCOUNTER (OUTPATIENT)
Dept: NON INVASIVE DIAGNOSTICS | Age: 73
Discharge: HOME | End: 2023-03-23
Payer: MEDICARE

## 2023-03-23 ENCOUNTER — HOSPITAL ENCOUNTER (OUTPATIENT)
Dept: CT IMAGING | Age: 73
Discharge: HOME | End: 2023-03-23
Payer: MEDICARE

## 2023-03-23 DIAGNOSIS — R91.1 PULMONARY NODULE: ICD-10-CM

## 2023-03-23 DIAGNOSIS — R06.02 SHORTNESS OF BREATH: ICD-10-CM

## 2023-03-23 LAB
LV EF: 70 %
LVEF MODALITY: NORMAL

## 2023-03-23 PROCEDURE — 71250 CT THORAX DX C-: CPT

## 2023-03-23 PROCEDURE — 78452 HT MUSCLE IMAGE SPECT MULT: CPT

## 2023-03-23 PROCEDURE — 6360000002 HC RX W HCPCS: Performed by: INTERNAL MEDICINE

## 2023-03-23 PROCEDURE — A9502 TC99M TETROFOSMIN: HCPCS | Performed by: INTERNAL MEDICINE

## 2023-03-23 PROCEDURE — 3430000000 HC RX DIAGNOSTIC RADIOPHARMACEUTICAL: Performed by: INTERNAL MEDICINE

## 2023-03-23 PROCEDURE — 93017 CV STRESS TEST TRACING ONLY: CPT

## 2023-03-23 RX ADMIN — TETROFOSMIN 10 MILLICURIE: 1.38 INJECTION, POWDER, LYOPHILIZED, FOR SOLUTION INTRAVENOUS at 09:29

## 2023-03-23 RX ADMIN — REGADENOSON 0.4 MG: 0.08 INJECTION, SOLUTION INTRAVENOUS at 10:46

## 2023-03-23 RX ADMIN — TETROFOSMIN 30 MILLICURIE: 1.38 INJECTION, POWDER, LYOPHILIZED, FOR SOLUTION INTRAVENOUS at 10:45

## 2023-03-28 ENCOUNTER — HOSPITAL ENCOUNTER (OUTPATIENT)
Dept: ULTRASOUND IMAGING | Age: 73
Discharge: HOME | End: 2023-03-28
Payer: MEDICARE

## 2023-03-28 DIAGNOSIS — E04.1 THYROID NODULE: ICD-10-CM

## 2023-03-28 DIAGNOSIS — L98.9 FACIAL SKIN LESION: ICD-10-CM

## 2023-03-28 PROCEDURE — 76536 US EXAM OF HEAD AND NECK: CPT

## 2023-03-29 ENCOUNTER — OFFICE VISIT (OUTPATIENT)
Dept: INTERNAL MEDICINE CLINIC | Age: 73
End: 2023-03-29

## 2023-03-29 VITALS
WEIGHT: 181 LBS | SYSTOLIC BLOOD PRESSURE: 112 MMHG | HEIGHT: 66 IN | DIASTOLIC BLOOD PRESSURE: 60 MMHG | HEART RATE: 60 BPM | BODY MASS INDEX: 29.09 KG/M2 | OXYGEN SATURATION: 97 %

## 2023-03-29 DIAGNOSIS — R91.1 PULMONARY NODULE: ICD-10-CM

## 2023-03-29 DIAGNOSIS — R56.9 SEIZURES (HCC): ICD-10-CM

## 2023-03-29 DIAGNOSIS — D64.9 MILD ANEMIA: ICD-10-CM

## 2023-03-29 DIAGNOSIS — M17.11 PRIMARY OSTEOARTHRITIS OF RIGHT KNEE: ICD-10-CM

## 2023-03-29 DIAGNOSIS — M25.561 CHRONIC PAIN OF RIGHT KNEE: ICD-10-CM

## 2023-03-29 DIAGNOSIS — G89.29 CHRONIC PAIN OF RIGHT KNEE: ICD-10-CM

## 2023-03-29 DIAGNOSIS — E04.1 THYROID NODULE: ICD-10-CM

## 2023-03-29 DIAGNOSIS — R90.82 WHITE MATTER DISEASE: ICD-10-CM

## 2023-03-29 DIAGNOSIS — R00.1 BRADYCARDIA: ICD-10-CM

## 2023-03-29 DIAGNOSIS — I10 ESSENTIAL HYPERTENSION: Primary | ICD-10-CM

## 2023-03-29 DIAGNOSIS — I25.10 CORONARY ARTERY CALCIFICATION SEEN ON CT SCAN: ICD-10-CM

## 2023-03-29 RX ORDER — METOPROLOL SUCCINATE 100 MG/1
50 TABLET, EXTENDED RELEASE ORAL DAILY
Qty: 90 TABLET | Refills: 3
Start: 2023-03-29

## 2023-03-29 NOTE — PROGRESS NOTES
Take 1 capsule by mouth 2 times daily as needed (nauease and GI upset) = 180 capsule 3    blood glucose monitor kit and supplies Check sugars qd and prn 1 kit 0    blood glucose monitor strips Test 1 times a day & as needed for symptoms of irregular blood glucose. 100 strip 3    Lancets MISC 1 each by Does not apply route daily 100 each 5    niacin 500 MG extended release capsule Take 500 mg by mouth nightly      CPAP Machine MISC by Does not apply route       No current facility-administered medications for this visit. Return in about 6 weeks (around 5/10/2023).

## 2023-03-30 PROCEDURE — 93242 EXT ECG>48HR<7D RECORDING: CPT | Performed by: INTERNAL MEDICINE

## 2023-03-31 ENCOUNTER — TELEPHONE (OUTPATIENT)
Dept: CARDIOLOGY CLINIC | Age: 73
End: 2023-03-31

## 2023-04-07 DIAGNOSIS — I10 ESSENTIAL HYPERTENSION: ICD-10-CM

## 2023-04-07 RX ORDER — METOPROLOL SUCCINATE 100 MG/1
TABLET, EXTENDED RELEASE ORAL
Qty: 180 TABLET | Refills: 3 | OUTPATIENT
Start: 2023-04-07

## 2023-04-09 PROBLEM — I25.10 CORONARY ARTERY CALCIFICATION SEEN ON CT SCAN: Status: ACTIVE | Noted: 2023-04-09

## 2023-04-09 PROBLEM — R51.9 LEFT TEMPORAL HEADACHE: Status: RESOLVED | Noted: 2018-11-07 | Resolved: 2023-04-09

## 2023-04-09 PROBLEM — R53.83 OTHER FATIGUE: Status: RESOLVED | Noted: 2018-11-07 | Resolved: 2023-04-09

## 2023-04-09 PROBLEM — M25.511 ACUTE PAIN OF RIGHT SHOULDER: Status: RESOLVED | Noted: 2022-08-28 | Resolved: 2023-04-09

## 2023-04-09 PROBLEM — H53.9 VISION DISTURBANCE: Status: RESOLVED | Noted: 2020-04-29 | Resolved: 2023-04-09

## 2023-04-09 PROBLEM — R06.02 SHORTNESS OF BREATH: Status: RESOLVED | Noted: 2018-11-07 | Resolved: 2023-04-09

## 2023-04-09 PROBLEM — R42 LIGHTHEADEDNESS: Status: RESOLVED | Noted: 2023-02-14 | Resolved: 2023-04-09

## 2023-04-09 PROBLEM — N63.0 BREAST NODULE: Status: RESOLVED | Noted: 2022-02-20 | Resolved: 2023-04-09

## 2023-04-09 NOTE — ASSESSMENT & PLAN NOTE
Patient did get a second opinion from Dr. Hortencia Reina at [facility]. She did undergo neuropsych testing for memory, and the results that I am not able to review but reportedly did not show evidence of dementia.

## 2023-04-09 NOTE — ASSESSMENT & PLAN NOTE
Coronary artery calcification seen on CT of chest done for follow-up of pulmonary nodule. Patient does have increased risk factors for heart disease including diabetes, hypertension, and hyperlipidemia. Referral placed to cardiology.   Did undergo stress test last week that was normal.

## 2023-04-09 NOTE — ASSESSMENT & PLAN NOTE
Blood pressure has been difficult to control but is actually better since increasing metoprolol. Heart rate is still running in the 50s. Decrease metoprolol ER from 100 mg daily to 50 mg daily. Continue Micardis 80 mg daily, spironolactone 25 mg daily, amlodipine 10 mg daily. Consider event monitor.

## 2023-04-21 ENCOUNTER — OFFICE VISIT (OUTPATIENT)
Dept: ORTHOPEDIC SURGERY | Age: 73
End: 2023-04-21

## 2023-04-21 VITALS — BODY MASS INDEX: 28.77 KG/M2 | WEIGHT: 179 LBS | HEIGHT: 66 IN

## 2023-04-21 DIAGNOSIS — M17.11 PRIMARY OSTEOARTHRITIS OF RIGHT KNEE: Primary | ICD-10-CM

## 2023-04-21 RX ORDER — HYALURONATE SODIUM 10 MG/ML
20 SYRINGE (ML) INTRAARTICULAR ONCE
Status: COMPLETED | OUTPATIENT
Start: 2023-04-21 | End: 2023-04-21

## 2023-04-21 RX ADMIN — Medication 20 MG: at 10:46

## 2023-04-21 NOTE — PROGRESS NOTES
Lei Zuluaga  [id number]  April 21, 2023    Chief Complaint   Patient presents with    Follow-up     Right knee 2nd Euflexxa       Ms. Obrien is here in follow-up regarding her right knee. She is having no problems or concerns. Her pain has slightly improved. Physical Exam:   Examination of the right knee reveals no sign of synovitis. There is minimal to no swelling. Examination is essentially unchanged. Impression:  right knee osteoarthritis. Plan: The patient was explained the risks, benefits and alternatives to injection. The patient understood the risks and benefits and agreed to proceed. We will go ahead and administer the second Euflexxa injection into the right knee under sterile conditions. After a betadine prep of the knee joint, 2cc of 1% Euflexxa was injected into the knee. The patient tolerated the injection rather well. The patient was instructed to follow up for any signs of infection. Branden Saul M.D.

## 2023-04-24 PROCEDURE — 93244 EXT ECG>48HR<7D REV&INTERPJ: CPT | Performed by: INTERNAL MEDICINE

## 2023-04-26 ENCOUNTER — OFFICE VISIT (OUTPATIENT)
Dept: CARDIOLOGY CLINIC | Age: 73
End: 2023-04-26
Payer: MEDICARE

## 2023-04-26 VITALS
WEIGHT: 179 LBS | BODY MASS INDEX: 28.77 KG/M2 | HEART RATE: 73 BPM | SYSTOLIC BLOOD PRESSURE: 134 MMHG | OXYGEN SATURATION: 97 % | DIASTOLIC BLOOD PRESSURE: 70 MMHG | HEIGHT: 66 IN

## 2023-04-26 DIAGNOSIS — I34.0 NON-RHEUMATIC MITRAL REGURGITATION: ICD-10-CM

## 2023-04-26 DIAGNOSIS — I25.10 CORONARY ARTERY CALCIFICATION: Primary | ICD-10-CM

## 2023-04-26 DIAGNOSIS — R00.2 PALPITATIONS: Primary | ICD-10-CM

## 2023-04-26 DIAGNOSIS — I10 ESSENTIAL HYPERTENSION: ICD-10-CM

## 2023-04-26 DIAGNOSIS — I65.23 BILATERAL CAROTID ARTERY STENOSIS: ICD-10-CM

## 2023-04-26 DIAGNOSIS — I25.84 CORONARY ARTERY CALCIFICATION: Primary | ICD-10-CM

## 2023-04-26 PROCEDURE — 3078F DIAST BP <80 MM HG: CPT | Performed by: INTERNAL MEDICINE

## 2023-04-26 PROCEDURE — G8417 CALC BMI ABV UP PARAM F/U: HCPCS | Performed by: INTERNAL MEDICINE

## 2023-04-26 PROCEDURE — 1123F ACP DISCUSS/DSCN MKR DOCD: CPT | Performed by: INTERNAL MEDICINE

## 2023-04-26 PROCEDURE — G8400 PT W/DXA NO RESULTS DOC: HCPCS | Performed by: INTERNAL MEDICINE

## 2023-04-26 PROCEDURE — 1036F TOBACCO NON-USER: CPT | Performed by: INTERNAL MEDICINE

## 2023-04-26 PROCEDURE — 3075F SYST BP GE 130 - 139MM HG: CPT | Performed by: INTERNAL MEDICINE

## 2023-04-26 PROCEDURE — 1090F PRES/ABSN URINE INCON ASSESS: CPT | Performed by: INTERNAL MEDICINE

## 2023-04-26 PROCEDURE — 99214 OFFICE O/P EST MOD 30 MIN: CPT | Performed by: INTERNAL MEDICINE

## 2023-04-26 PROCEDURE — 3017F COLORECTAL CA SCREEN DOC REV: CPT | Performed by: INTERNAL MEDICINE

## 2023-04-26 PROCEDURE — G8427 DOCREV CUR MEDS BY ELIG CLIN: HCPCS | Performed by: INTERNAL MEDICINE

## 2023-04-26 NOTE — PROGRESS NOTES
Discussed risk factor modifications and instructed to call with any worsening symptoms. 2) Essential hypertension. Controlled. Goal BP <130/80. Continue medical therapy. 3) Bilateral carotid stenosis. Continue statin and will arrange for repeat carotid dopplers. 4) CKD stage IIIb. Following with nephrology. Creatinine remains stable. 5) HLD/Prediabetes. Hgb A1c 6.1. Management per PCP. Continue statin with Crestor 20 mg daily. 6) Mitral regurgitation. Documentated as moderate 11/2021. Will continue to monitor clinically and repeat echocardiogram at follow-up (or sooner with changes in symptoms). Follow up in 1 year. Thank you very much for allowing me to participate in the care of your patient. Please do not hesitate to contact me if you have any questions. Sincerely,  Suhail Rubi. Asher Wen, [address] [place] [address]  Ph: [phone]  Fax: [phone]    This note was scribed in the presence of Dr Asher Wen MD by Benigno Pederson RN. Physician Attestation: The scribes documentation has been prepared under my direction and personally reviewed by me in its entirety. I confirm that the note above accurately reflects all work, treatment, procedures, and medical decision making performed by me. All portions of the note including but not limited to the chief complaint, history of present illness, physical exam, assessment and plan/medical decision making were personally reviewed, edited, and updated on the day of the visit.

## 2023-04-28 ENCOUNTER — OFFICE VISIT (OUTPATIENT)
Dept: ORTHOPEDIC SURGERY | Age: 73
End: 2023-04-28

## 2023-04-28 VITALS — WEIGHT: 179 LBS | BODY MASS INDEX: 28.77 KG/M2 | HEIGHT: 66 IN

## 2023-04-28 DIAGNOSIS — M17.11 PRIMARY OSTEOARTHRITIS OF RIGHT KNEE: Primary | ICD-10-CM

## 2023-04-28 RX ORDER — HYALURONATE SODIUM 10 MG/ML
20 SYRINGE (ML) INTRAARTICULAR ONCE
Status: COMPLETED | OUTPATIENT
Start: 2023-04-28 | End: 2023-04-28

## 2023-04-28 RX ADMIN — Medication 20 MG: at 10:08

## 2023-04-28 NOTE — PROGRESS NOTES
Shawanda Campbell  [id number]  April 28, 2023    Chief Complaint   Patient presents with    Follow-up     Right knee 3rd Euflexxa       Ms. Obrien is here in follow-up regarding her right knee. She is having no problems or concerns. Her pain has slightly improved. Physical Exam:   Examination of the right knee reveals no sign of synovitis. There is minimal to no swelling. Examination is essentially unchanged. Impression:  right knee osteoarthritis. Plan: The patient was explained the risks, benefits and alternatives to injection. The patient understood the risks and benefits and agreed to proceed. We will go ahead and administer the third Euflexxa injection into the right knee under sterile conditions. After a betadine prep of the knee joint, 2cc of 1% Euflexxa was injected into the knee. The patient tolerated the injection rather well. The patient was instructed to follow up for any signs of infection. The patient will follow up in six weeks. Four views of the right knee will be obtained. If the patient is continuing to have pain we will consider total knee arthroplasty. Branden Zamorano M.D.

## 2023-05-01 DIAGNOSIS — E04.1 THYROID NODULE: Primary | ICD-10-CM

## 2023-05-08 DIAGNOSIS — M51.36 DDD (DEGENERATIVE DISC DISEASE), LUMBAR: ICD-10-CM

## 2023-05-08 DIAGNOSIS — G89.4 CHRONIC PAIN DISORDER: ICD-10-CM

## 2023-05-08 RX ORDER — OXYCODONE HYDROCHLORIDE AND ACETAMINOPHEN 5; 325 MG/1; MG/1
.5-1 TABLET ORAL EVERY 8 HOURS PRN
Qty: 60 TABLET | Refills: 0 | Status: SHIPPED | OUTPATIENT
Start: 2023-05-08 | End: 2023-06-07

## 2023-05-08 NOTE — TELEPHONE ENCOUNTER
Medication: Oxycodone 5-325 mg    Last Filled:  04/10/23    Patient Pharmacy: [facility] in [place]    Patient Phone Number: [phone] (home)     Last appt: 3/29/2023   Next appt: 5/9/2023    Last OARRS:   RX Monitoring 12/30/2022   Periodic Controlled Substance Monitoring Obtaining appropriate analgesic effect of treatment.        Last Labs DM:   Lab Results   Component Value Date/Time    LABA1C 6.0 02/14/2023 02:51 PM     Last Lipid:   Lab Results   Component Value Date/Time    CHOL 160 02/14/2023 02:51 PM    TRIG 169 02/14/2023 02:51 PM    HDL 53 02/14/2023 02:51 PM    [address] 73 02/14/2023 02:51 PM     Last PSA: No results found for: PSA  Last Thyroid: No results found for: TSH, FT3, I8QSPLV, T4FREE, M2MGSCB

## 2023-05-09 ENCOUNTER — OFFICE VISIT (OUTPATIENT)
Dept: INTERNAL MEDICINE CLINIC | Age: 73
End: 2023-05-09

## 2023-05-09 VITALS
BODY MASS INDEX: 28.57 KG/M2 | DIASTOLIC BLOOD PRESSURE: 80 MMHG | SYSTOLIC BLOOD PRESSURE: 112 MMHG | OXYGEN SATURATION: 97 % | HEART RATE: 64 BPM | WEIGHT: 177 LBS

## 2023-05-09 DIAGNOSIS — E11.9 TYPE 2 DIABETES MELLITUS WITHOUT COMPLICATION, WITHOUT LONG-TERM CURRENT USE OF INSULIN (HCC): ICD-10-CM

## 2023-05-09 DIAGNOSIS — Z12.31 ENCOUNTER FOR SCREENING MAMMOGRAM FOR MALIGNANT NEOPLASM OF BREAST: ICD-10-CM

## 2023-05-09 DIAGNOSIS — E78.5 HYPERLIPIDEMIA LDL GOAL <70: ICD-10-CM

## 2023-05-09 DIAGNOSIS — M54.12 CERVICAL RADICULOPATHY: ICD-10-CM

## 2023-05-09 DIAGNOSIS — M25.561 CHRONIC PAIN OF BOTH KNEES: ICD-10-CM

## 2023-05-09 DIAGNOSIS — G89.29 CHRONIC PAIN OF BOTH KNEES: ICD-10-CM

## 2023-05-09 DIAGNOSIS — Z12.11 COLON CANCER SCREENING: ICD-10-CM

## 2023-05-09 DIAGNOSIS — M25.562 CHRONIC PAIN OF BOTH KNEES: ICD-10-CM

## 2023-05-09 DIAGNOSIS — I10 ESSENTIAL HYPERTENSION: ICD-10-CM

## 2023-05-09 DIAGNOSIS — M17.0 PRIMARY OSTEOARTHRITIS OF BOTH KNEES: ICD-10-CM

## 2023-05-09 DIAGNOSIS — M51.36 DDD (DEGENERATIVE DISC DISEASE), LUMBAR: ICD-10-CM

## 2023-05-09 DIAGNOSIS — I65.23 BILATERAL CAROTID ARTERY STENOSIS: Primary | ICD-10-CM

## 2023-05-09 DIAGNOSIS — R00.1 BRADYCARDIA: ICD-10-CM

## 2023-05-09 DIAGNOSIS — I25.10 CORONARY ARTERY CALCIFICATION SEEN ON CT SCAN: ICD-10-CM

## 2023-05-09 ASSESSMENT — ENCOUNTER SYMPTOMS
DIARRHEA: 0
CONSTIPATION: 0
SHORTNESS OF BREATH: 0
CHEST TIGHTNESS: 0

## 2023-05-09 NOTE — PROGRESS NOTES
Occupational History    Occupation: retired   Tobacco Use    Smoking status: Never    Smokeless tobacco: Never   Vaping Use    Vaping Use: Never used   Substance and Sexual Activity    Alcohol use: No    Drug use: No    Sexual activity: Not on file   Other Topics Concern    Not on file   Social History Narrative    Not on file     Social Determinants of Health     Financial Resource Strain: Low Risk     Difficulty of Paying Living Expenses: Not hard at all   Food Insecurity: No Food Insecurity    Worried About Running Out of Food in the Last Year: Never true    [address] in the Last Year: Never true   Transportation Needs: Unknown    Lack of Transportation (Medical): Not on file    Lack of Transportation (Non-Medical): No   Physical Activity: Inactive    Days of Exercise per Week: 0 days    Minutes of Exercise per Session: 0 min   Stress: Not on file   Social Connections: Not on file   Intimate Partner Violence: Not on file   Housing Stability: Unknown    Unable to Pay for Housing in the Last Year: Not on file    Number of [place] in the Last Year: Not on file    Unstable Housing in the Last Year: No     Family History   Problem Relation Age of Onset    Heart Failure Mother     COPD Father         Outpatient Medications Prior to Visit   Medication Sig Dispense Refill    oxyCODONE-acetaminophen (PERCOCET) 5-325 MG per tablet Take 0.5-1 tablets by mouth every 8 hours as needed for Pain for up to 30 days.  60 tablet 0    metoprolol succinate (TOPROL XL) 100 MG extended release tablet Take 0.5 tablets by mouth daily 90 tablet 3    JARDIANCE 25 MG tablet TAKE 1 TABLET DAILY 90 tablet 3    amLODIPine (NORVASC) 10 MG tablet TAKE 1 TABLET DAILY 90 tablet 3    metFORMIN (GLUCOPHAGE-XR) 500 MG extended release tablet TAKE 1 TABLET TWICE A DAY WITH MEALS 180 tablet 1    venlafaxine (EFFEXOR XR) 150 MG extended release capsule Take 1 capsule by mouth daily 90 capsule 1    vitamin D (ERGOCALCIFEROL) 1.25 MG ([id number]

## 2023-05-10 NOTE — ASSESSMENT & PLAN NOTE
Patient did establish with Dr. Inessa Santana. After stress test was normal, no additional interventions were recommended other than expected medication. Continue Crestor 20 mg daily, metoprolol ER 50 mg daily, amlodipine 10 mg daily, and Micardis 80 mg daily.

## 2023-05-10 NOTE — ASSESSMENT & PLAN NOTE
Well-controlled. ?  Related to improve pain control. Continue metoprolol ER 50 mg daily, Micardis 80 mg daily, amlodipine 10 mg daily.

## 2023-05-10 NOTE — ASSESSMENT & PLAN NOTE
Following with Dr. Tevin Sandy for orthopedics. Underwent Euflexxa injections with some improvement in pain. Has been told she will require TKA if Euflexxa does not work.

## 2023-06-07 ENCOUNTER — TELEPHONE (OUTPATIENT)
Dept: INTERNAL MEDICINE CLINIC | Age: 73
End: 2023-06-07

## 2023-06-07 DIAGNOSIS — M51.36 DDD (DEGENERATIVE DISC DISEASE), LUMBAR: ICD-10-CM

## 2023-06-07 DIAGNOSIS — G89.4 CHRONIC PAIN DISORDER: ICD-10-CM

## 2023-06-07 LAB — NONINV COLON CA DNA+OCC BLD SCRN STL QL: POSITIVE

## 2023-06-07 RX ORDER — OXYCODONE HYDROCHLORIDE AND ACETAMINOPHEN 5; 325 MG/1; MG/1
.5-1 TABLET ORAL EVERY 8 HOURS PRN
Qty: 60 TABLET | Refills: 0 | Status: SHIPPED | OUTPATIENT
Start: 2023-06-07 | End: 2023-07-07

## 2023-06-07 NOTE — TELEPHONE ENCOUNTER
Pt  calling requesting refill of Oxycodone    Last written 5/8/23  Last OV 5.9.23  Next OV 8/11/23  Last recommended OV NA     Please send to [address]

## 2023-06-09 ENCOUNTER — OFFICE VISIT (OUTPATIENT)
Dept: ORTHOPEDIC SURGERY | Age: 73
End: 2023-06-09

## 2023-06-09 VITALS — BODY MASS INDEX: 28.45 KG/M2 | HEIGHT: 66 IN | WEIGHT: 177 LBS

## 2023-06-09 DIAGNOSIS — M17.11 PRIMARY OSTEOARTHRITIS OF RIGHT KNEE: Primary | ICD-10-CM

## 2023-06-09 NOTE — PROGRESS NOTES
are normal and symmetric. X-rays: 4 views of the right knee obtained in the office today were extensively reviewed. The patient has severe osteoarthritis with varus deformity. There is complete loss of the medial joint space. Impression: Right knee osteoarthritis    Plan: At this time, we will continue with conservative treatment. She is aware that she does need a total knee arthroplasty. She would like to hold off on surgical intervention. She will continue to work on range of motion and strengthening. She will follow-up with me in approximately 3 months and we will reassess her then.     Orders Placed This Encounter   Procedures    XR KNEE RIGHT (MIN 4 VIEWS)     Standing Status:   Future     Number of Occurrences:   1     Standing Expiration Date:   6/9/2024     Scheduling Instructions:      Rm 25     Order Specific Question:   Reason for exam:     Answer:   pain

## 2023-06-19 DIAGNOSIS — E78.5 HYPERLIPIDEMIA LDL GOAL <100: ICD-10-CM

## 2023-06-19 RX ORDER — ROSUVASTATIN CALCIUM 20 MG/1
TABLET, COATED ORAL
Qty: 90 TABLET | Refills: 3 | Status: SHIPPED | OUTPATIENT
Start: 2023-06-19

## 2023-07-10 ENCOUNTER — TELEPHONE (OUTPATIENT)
Dept: INTERNAL MEDICINE CLINIC | Age: 73
End: 2023-07-10

## 2023-07-10 DIAGNOSIS — G89.4 CHRONIC PAIN DISORDER: ICD-10-CM

## 2023-07-10 DIAGNOSIS — M51.36 DDD (DEGENERATIVE DISC DISEASE), LUMBAR: ICD-10-CM

## 2023-07-10 RX ORDER — OXYCODONE HYDROCHLORIDE AND ACETAMINOPHEN 5; 325 MG/1; MG/1
.5-1 TABLET ORAL EVERY 8 HOURS PRN
Qty: 60 TABLET | Refills: 0 | Status: SHIPPED | OUTPATIENT
Start: 2023-07-10 | End: 2023-07-11 | Stop reason: SDUPTHER

## 2023-07-10 RX ORDER — OXYCODONE HYDROCHLORIDE AND ACETAMINOPHEN 5; 325 MG/1; MG/1
.5-1 TABLET ORAL EVERY 8 HOURS PRN
Qty: 60 TABLET | Refills: 0 | Status: SHIPPED | OUTPATIENT
Start: 2023-07-10 | End: 2023-07-10 | Stop reason: SDUPTHER

## 2023-07-10 NOTE — TELEPHONE ENCOUNTER
Pt  calling requesting refill of Oxycodone     Last written 6/7/23  Last OV 5/9/23  Next OV 8/11/23  Last recommended OV NA     Please send to [facility]

## 2023-07-11 DIAGNOSIS — G89.4 CHRONIC PAIN DISORDER: ICD-10-CM

## 2023-07-11 DIAGNOSIS — M51.36 DDD (DEGENERATIVE DISC DISEASE), LUMBAR: ICD-10-CM

## 2023-07-11 RX ORDER — OXYCODONE HYDROCHLORIDE AND ACETAMINOPHEN 5; 325 MG/1; MG/1
.5-1 TABLET ORAL EVERY 8 HOURS PRN
Qty: 60 TABLET | Refills: 0 | Status: SHIPPED | OUTPATIENT
Start: 2023-07-11 | End: 2023-08-10

## 2023-08-08 DIAGNOSIS — E11.9 TYPE 2 DIABETES MELLITUS WITHOUT COMPLICATION, WITHOUT LONG-TERM CURRENT USE OF INSULIN (HCC): ICD-10-CM

## 2023-08-08 RX ORDER — METFORMIN HYDROCHLORIDE 500 MG/1
TABLET, EXTENDED RELEASE ORAL
Qty: 180 TABLET | Refills: 1 | Status: SHIPPED | OUTPATIENT
Start: 2023-08-08 | End: 2023-08-22 | Stop reason: SDUPTHER

## 2023-08-09 DIAGNOSIS — M51.36 DDD (DEGENERATIVE DISC DISEASE), LUMBAR: ICD-10-CM

## 2023-08-09 DIAGNOSIS — G89.4 CHRONIC PAIN DISORDER: ICD-10-CM

## 2023-08-09 RX ORDER — OXYCODONE HYDROCHLORIDE AND ACETAMINOPHEN 5; 325 MG/1; MG/1
.5-1 TABLET ORAL EVERY 8 HOURS PRN
Qty: 60 TABLET | Refills: 0 | Status: SHIPPED | OUTPATIENT
Start: 2023-08-09 | End: 2023-09-08

## 2023-08-09 NOTE — TELEPHONE ENCOUNTER
Medication: oxyCODONE    Last Filled:  07/11/2023    Patient Pharmacy: [facility] in [address] [place] [address]    Patient Phone Number: [phone] (home)     Last appt: 5/9/2023   Next appt: 8/22/2023    Last OARRS:   RX Monitoring 12/30/2022   Periodic Controlled Substance Monitoring Obtaining appropriate analgesic effect of treatment.        Last Labs DM:   Lab Results   Component Value Date/Time    LABA1C 6.0 02/14/2023 02:51 PM     Last Lipid:   Lab Results   Component Value Date/Time    CHOL 160 02/14/2023 02:51 PM    TRIG 169 02/14/2023 02:51 PM    HDL 53 02/14/2023 02:51 PM    [address] [facility] 73 02/14/2023 02:51 PM     Last PSA: No results found for: PSA  Last Thyroid: No results found for: TSH, FT3, A7SKQKV, T4FREE, C9URKBI

## 2023-08-22 ENCOUNTER — OFFICE VISIT (OUTPATIENT)
Dept: INTERNAL MEDICINE CLINIC | Age: 73
End: 2023-08-22

## 2023-08-22 VITALS
OXYGEN SATURATION: 98 % | HEIGHT: 66 IN | DIASTOLIC BLOOD PRESSURE: 78 MMHG | BODY MASS INDEX: 28.51 KG/M2 | SYSTOLIC BLOOD PRESSURE: 128 MMHG | WEIGHT: 177.4 LBS | HEART RATE: 56 BPM

## 2023-08-22 DIAGNOSIS — I10 ESSENTIAL HYPERTENSION: ICD-10-CM

## 2023-08-22 DIAGNOSIS — G89.4 CHRONIC PAIN DISORDER: ICD-10-CM

## 2023-08-22 DIAGNOSIS — F41.9 ANXIETY AND DEPRESSION: ICD-10-CM

## 2023-08-22 DIAGNOSIS — I25.10 CORONARY ARTERY CALCIFICATION SEEN ON CT SCAN: ICD-10-CM

## 2023-08-22 DIAGNOSIS — E78.5 HYPERLIPIDEMIA LDL GOAL <70: ICD-10-CM

## 2023-08-22 DIAGNOSIS — Z79.899 MEDICATION MANAGEMENT: ICD-10-CM

## 2023-08-22 DIAGNOSIS — N18.32 STAGE 3B CHRONIC KIDNEY DISEASE (HCC): ICD-10-CM

## 2023-08-22 DIAGNOSIS — E11.9 TYPE 2 DIABETES MELLITUS WITHOUT COMPLICATION, WITHOUT LONG-TERM CURRENT USE OF INSULIN (HCC): ICD-10-CM

## 2023-08-22 DIAGNOSIS — F32.A ANXIETY AND DEPRESSION: ICD-10-CM

## 2023-08-22 DIAGNOSIS — R23.2 HOT FLASHES: Primary | ICD-10-CM

## 2023-08-22 DIAGNOSIS — E55.9 VITAMIN D DEFICIENCY: ICD-10-CM

## 2023-08-22 DIAGNOSIS — R61 NIGHT SWEATS: ICD-10-CM

## 2023-08-22 LAB
BASOPHILS # BLD: 0.1 K/UL (ref 0–0.2)
BASOPHILS NFR BLD: 0.9 %
DEPRECATED RDW RBC AUTO: 14.3 % (ref 12.4–15.4)
EOSINOPHIL # BLD: 0.1 K/UL (ref 0–0.6)
EOSINOPHIL NFR BLD: 0.9 %
HBA1C MFR BLD: 6.1 %
HCT VFR BLD AUTO: 44.7 % (ref 36–48)
HGB BLD-MCNC: 14.5 G/DL (ref 12–16)
LYMPHOCYTES # BLD: 2.2 K/UL (ref 1–5.1)
LYMPHOCYTES NFR BLD: 19.9 %
MCH RBC QN AUTO: 28.2 PG (ref 26–34)
MCHC RBC AUTO-ENTMCNC: 32.4 G/DL (ref 31–36)
MCV RBC AUTO: 87 FL (ref 80–100)
MONOCYTES # BLD: 0.8 K/UL (ref 0–1.3)
MONOCYTES NFR BLD: 7 %
NEUTROPHILS # BLD: 7.7 K/UL (ref 1.7–7.7)
NEUTROPHILS NFR BLD: 71.3 %
PLATELET # BLD AUTO: 311 K/UL (ref 135–450)
PMV BLD AUTO: 8.9 FL (ref 5–10.5)
RBC # BLD AUTO: 5.13 M/UL (ref 4–5.2)
WBC # BLD AUTO: 10.8 K/UL (ref 4–11)

## 2023-08-22 RX ORDER — EMPAGLIFLOZIN 25 MG/1
25 TABLET, FILM COATED ORAL DAILY
Qty: 90 TABLET | Refills: 3 | Status: SHIPPED | OUTPATIENT
Start: 2023-08-22

## 2023-08-22 RX ORDER — VENLAFAXINE HYDROCHLORIDE 150 MG/1
CAPSULE, EXTENDED RELEASE ORAL
Qty: 90 CAPSULE | Refills: 1 | OUTPATIENT
Start: 2023-08-22

## 2023-08-22 RX ORDER — AMLODIPINE BESYLATE 10 MG/1
10 TABLET ORAL DAILY
Qty: 90 TABLET | Refills: 3 | Status: SHIPPED | OUTPATIENT
Start: 2023-08-22

## 2023-08-22 RX ORDER — VENLAFAXINE HYDROCHLORIDE 150 MG/1
150 CAPSULE, EXTENDED RELEASE ORAL DAILY
Qty: 30 CAPSULE | Refills: 0 | Status: SHIPPED | OUTPATIENT
Start: 2023-08-22 | End: 2023-09-21

## 2023-08-22 RX ORDER — METOPROLOL SUCCINATE 50 MG/1
50 TABLET, EXTENDED RELEASE ORAL DAILY
Qty: 90 TABLET | Refills: 3 | Status: SHIPPED | OUTPATIENT
Start: 2023-08-22

## 2023-08-22 RX ORDER — VENLAFAXINE HYDROCHLORIDE 150 MG/1
150 CAPSULE, EXTENDED RELEASE ORAL DAILY
Qty: 90 CAPSULE | Refills: 1 | Status: SHIPPED | OUTPATIENT
Start: 2023-08-22

## 2023-08-22 RX ORDER — METFORMIN HYDROCHLORIDE 500 MG/1
500 TABLET, EXTENDED RELEASE ORAL
Qty: 90 TABLET | Refills: 1 | Status: SHIPPED | OUTPATIENT
Start: 2023-08-22

## 2023-08-22 RX ORDER — TELMISARTAN 80 MG/1
80 TABLET ORAL DAILY
Qty: 90 TABLET | Refills: 3 | Status: SHIPPED | OUTPATIENT
Start: 2023-08-22

## 2023-08-22 NOTE — PROGRESS NOTES
capsule 3    blood glucose monitor kit and supplies Check sugars qd and prn 1 kit 0    blood glucose monitor strips Test 1 times a day & as needed for symptoms of irregular blood glucose. 100 strip 3    Lancets MISC 1 each by Does not apply route daily 100 each 5    niacin 500 MG extended release capsule Take 1 capsule by mouth nightly      CPAP Machine MISC by Does not apply route      gabapentin (NEURONTIN) 300 MG capsule TAKE 1 CAPSULE NIGHTLY 90 capsule 3     No current facility-administered medications for this visit. Return in about 3 months (around 11/22/2023).

## 2023-08-23 LAB
ALBUMIN SERPL-MCNC: 4.7 G/DL (ref 3.4–5)
ALBUMIN/GLOB SERPL: 1.7 {RATIO} (ref 1.1–2.2)
ALP SERPL-CCNC: 85 U/L (ref 40–129)
ALT SERPL-CCNC: 26 U/L (ref 10–40)
AMPHETAMINES UR QL SCN>1000 NG/ML: ABNORMAL
ANION GAP SERPL CALCULATED.3IONS-SCNC: 15 MMOL/L (ref 3–16)
AST SERPL-CCNC: 22 U/L (ref 15–37)
BARBITURATES UR QL SCN>200 NG/ML: ABNORMAL
BENZODIAZ UR QL SCN>200 NG/ML: ABNORMAL
BILIRUB SERPL-MCNC: 0.4 MG/DL (ref 0–1)
BUN SERPL-MCNC: 16 MG/DL (ref 7–20)
CALCIUM SERPL-MCNC: 9.8 MG/DL (ref 8.3–10.6)
CANNABINOIDS UR QL SCN>50 NG/ML: ABNORMAL
CHLORIDE SERPL-SCNC: 102 MMOL/L (ref 99–110)
CHOLEST SERPL-MCNC: 180 MG/DL (ref 0–199)
CO2 SERPL-SCNC: 24 MMOL/L (ref 21–32)
COCAINE UR QL SCN: ABNORMAL
CREAT SERPL-MCNC: 1.1 MG/DL (ref 0.6–1.2)
CREAT UR-MCNC: 112.1 MG/DL (ref 28–259)
DRUG SCREEN COMMENT UR-IMP: ABNORMAL
FENTANYL SCREEN, URINE: ABNORMAL
GFR SERPLBLD CREATININE-BSD FMLA CKD-EPI: 53 ML/MIN/{1.73_M2}
GLUCOSE SERPL-MCNC: 125 MG/DL (ref 70–99)
HDLC SERPL-MCNC: 77 MG/DL (ref 40–60)
LDLC SERPL CALC-MCNC: 68 MG/DL
METHADONE UR QL SCN>300 NG/ML: ABNORMAL
MICROALBUMIN UR DL<=1MG/L-MCNC: 10.8 MG/DL
MICROALBUMIN/CREAT UR: 96.3 MG/G (ref 0–30)
OPIATES UR QL SCN>300 NG/ML: ABNORMAL
OXYCODONE UR QL SCN: POSITIVE
PCP UR QL SCN>25 NG/ML: ABNORMAL
PH UR STRIP: 6 [PH]
POTASSIUM SERPL-SCNC: 3.8 MMOL/L (ref 3.5–5.1)
PROT SERPL-MCNC: 7.5 G/DL (ref 6.4–8.2)
SODIUM SERPL-SCNC: 141 MMOL/L (ref 136–145)
TRIGL SERPL-MCNC: 176 MG/DL (ref 0–150)
TSH SERPL DL<=0.005 MIU/L-ACNC: 1.11 UIU/ML (ref 0.27–4.2)
VLDLC SERPL CALC-MCNC: 35 MG/DL

## 2023-08-25 NOTE — ASSESSMENT & PLAN NOTE
Due for labs- check lipid panel and CMP. Remains on Crestor 10 mg qd. no suicidal ideation/no depression/no anxiety/no insomnia

## 2023-08-27 PROBLEM — R61 NIGHT SWEATS: Status: ACTIVE | Noted: 2023-08-27

## 2023-08-27 PROBLEM — R23.2 HOT FLASHES: Status: ACTIVE | Noted: 2023-08-27

## 2023-08-27 ASSESSMENT — ENCOUNTER SYMPTOMS
CONSTIPATION: 0
SHORTNESS OF BREATH: 0
CHEST TIGHTNESS: 0
DIARRHEA: 0

## 2023-08-27 NOTE — RESULT ENCOUNTER NOTE
Please see MyChart message but also call-    Hi Pat-  Your labs look pretty good overall:  1. Your blood count is normal.  2.  Your thyroid is normal.  3.  Your cholesterol is excellent! 4. Your kidney function is actually better at 53%, your electrolytes and liver enzymes are normal.  5.  You do have more protein in your urine. Dr. Keesha Brian should be able to see these results when you see him for your next appointment. Please let me know questions you may have.   Dr. Khadijah Gamboa

## 2023-09-11 DIAGNOSIS — G89.4 CHRONIC PAIN DISORDER: ICD-10-CM

## 2023-09-11 DIAGNOSIS — M51.36 DDD (DEGENERATIVE DISC DISEASE), LUMBAR: ICD-10-CM

## 2023-09-11 RX ORDER — OXYCODONE HYDROCHLORIDE AND ACETAMINOPHEN 5; 325 MG/1; MG/1
.5-1 TABLET ORAL EVERY 8 HOURS PRN
Qty: 60 TABLET | Refills: 0 | Status: SHIPPED | OUTPATIENT
Start: 2023-09-11 | End: 2023-10-11

## 2023-09-11 NOTE — TELEPHONE ENCOUNTER
Medication: Oxycodone-acetaminophen     Last Filled:  08/09/23    Patient Pharmacy: [facility] in [place]    Patient Phone Number: [phone] (home)     Last appt: 8/22/2023   Next appt: 12/5/2023    Last OARRS:       12/30/2022    12:57 PM   RX Monitoring   Periodic Controlled Substance Monitoring Obtaining appropriate analgesic effect of treatment.        Last Labs DM:   Lab Results   Component Value Date/Time    LABA1C 6.1 08/22/2023 03:07 PM     Last Lipid:   Lab Results   Component Value Date/Time    CHOL 180 08/22/2023 02:22 PM    TRIG 176 08/22/2023 02:22 PM    HDL 77 08/22/2023 02:22 PM    [facility] 08/22/2023 02:22 PM     Last PSA: No results found for: \"PSA\"  Last Thyroid: No results found for: \"TSH\", \"FT3\", \"Q2XIEGW\", \"T4FREE\", \"K8MPLHC\"

## 2023-09-12 DIAGNOSIS — F41.9 ANXIETY AND DEPRESSION: ICD-10-CM

## 2023-09-12 DIAGNOSIS — F32.A ANXIETY AND DEPRESSION: ICD-10-CM

## 2023-09-12 RX ORDER — VENLAFAXINE HYDROCHLORIDE 150 MG/1
CAPSULE, EXTENDED RELEASE ORAL DAILY
Qty: 90 CAPSULE | Refills: 1 | OUTPATIENT
Start: 2023-09-12

## 2023-09-27 RX ORDER — GABAPENTIN 300 MG/1
CAPSULE ORAL
Qty: 90 CAPSULE | Refills: 3 | Status: SHIPPED | OUTPATIENT
Start: 2023-09-27 | End: 2024-03-25

## 2023-10-09 ENCOUNTER — TELEPHONE (OUTPATIENT)
Dept: INTERNAL MEDICINE CLINIC | Age: 73
End: 2023-10-09

## 2023-10-09 DIAGNOSIS — M51.36 DDD (DEGENERATIVE DISC DISEASE), LUMBAR: ICD-10-CM

## 2023-10-09 DIAGNOSIS — G89.4 CHRONIC PAIN DISORDER: ICD-10-CM

## 2023-10-09 RX ORDER — OXYCODONE HYDROCHLORIDE AND ACETAMINOPHEN 5; 325 MG/1; MG/1
.5-1 TABLET ORAL EVERY 8 HOURS PRN
Qty: 60 TABLET | Refills: 0 | Status: SHIPPED | OUTPATIENT
Start: 2023-10-09 | End: 2023-11-08 | Stop reason: SDUPTHER

## 2023-10-09 NOTE — TELEPHONE ENCOUNTER
Pt  calling requesting refill of Oxycodone     Last written 9/11/23  Last OV 8/22/23  Next OV 12/5/23  Last recommended OV NA     [facility] [place]

## 2023-10-30 DIAGNOSIS — E11.9 TYPE 2 DIABETES MELLITUS WITHOUT COMPLICATION, WITHOUT LONG-TERM CURRENT USE OF INSULIN (HCC): ICD-10-CM

## 2023-10-30 DIAGNOSIS — E78.5 HYPERLIPIDEMIA LDL GOAL <70: ICD-10-CM

## 2023-10-30 DIAGNOSIS — I10 ESSENTIAL HYPERTENSION: ICD-10-CM

## 2023-10-30 LAB
ALBUMIN SERPL-MCNC: 4.2 G/DL (ref 3.4–5)
ALBUMIN/GLOB SERPL: 1.9 {RATIO} (ref 1.1–2.2)
ALP SERPL-CCNC: 76 U/L (ref 40–129)
ALT SERPL-CCNC: 18 U/L (ref 10–40)
ANION GAP SERPL CALCULATED.3IONS-SCNC: 9 MMOL/L (ref 3–16)
AST SERPL-CCNC: 16 U/L (ref 15–37)
BASOPHILS # BLD: 0.1 K/UL (ref 0–0.2)
BASOPHILS NFR BLD: 0.7 %
BILIRUB SERPL-MCNC: 0.4 MG/DL (ref 0–1)
BUN SERPL-MCNC: 21 MG/DL (ref 7–20)
CALCIUM SERPL-MCNC: 9.6 MG/DL (ref 8.3–10.6)
CHLORIDE SERPL-SCNC: 107 MMOL/L (ref 99–110)
CHOLEST SERPL-MCNC: 148 MG/DL (ref 0–199)
CO2 SERPL-SCNC: 27 MMOL/L (ref 21–32)
CREAT SERPL-MCNC: 1 MG/DL (ref 0.6–1.2)
DEPRECATED RDW RBC AUTO: 14.2 % (ref 12.4–15.4)
EOSINOPHIL # BLD: 0.3 K/UL (ref 0–0.6)
EOSINOPHIL NFR BLD: 3.7 %
GFR SERPLBLD CREATININE-BSD FMLA CKD-EPI: 59 ML/MIN/{1.73_M2}
GLUCOSE SERPL-MCNC: 107 MG/DL (ref 70–99)
HCT VFR BLD AUTO: 39.5 % (ref 36–48)
HDLC SERPL-MCNC: 59 MG/DL (ref 40–60)
HGB BLD-MCNC: 13.3 G/DL (ref 12–16)
LDLC SERPL CALC-MCNC: 54 MG/DL
LYMPHOCYTES # BLD: 2.3 K/UL (ref 1–5.1)
LYMPHOCYTES NFR BLD: 27.4 %
MCH RBC QN AUTO: 29 PG (ref 26–34)
MCHC RBC AUTO-ENTMCNC: 33.6 G/DL (ref 31–36)
MCV RBC AUTO: 86.3 FL (ref 80–100)
MONOCYTES # BLD: 0.7 K/UL (ref 0–1.3)
MONOCYTES NFR BLD: 8.3 %
NEUTROPHILS # BLD: 5 K/UL (ref 1.7–7.7)
NEUTROPHILS NFR BLD: 59.9 %
PLATELET # BLD AUTO: 246 K/UL (ref 135–450)
PMV BLD AUTO: 8.9 FL (ref 5–10.5)
POTASSIUM SERPL-SCNC: 3.5 MMOL/L (ref 3.5–5.1)
PROT SERPL-MCNC: 6.4 G/DL (ref 6.4–8.2)
RBC # BLD AUTO: 4.58 M/UL (ref 4–5.2)
SODIUM SERPL-SCNC: 143 MMOL/L (ref 136–145)
TRIGL SERPL-MCNC: 176 MG/DL (ref 0–150)
VLDLC SERPL CALC-MCNC: 35 MG/DL
WBC # BLD AUTO: 8.3 K/UL (ref 4–11)

## 2023-10-31 LAB
EST. AVERAGE GLUCOSE BLD GHB EST-MCNC: 125.5 MG/DL
HBA1C MFR BLD: 6 %

## 2023-11-07 ENCOUNTER — TELEPHONE (OUTPATIENT)
Dept: INTERNAL MEDICINE CLINIC | Age: 73
End: 2023-11-07

## 2023-11-07 DIAGNOSIS — M51.36 DDD (DEGENERATIVE DISC DISEASE), LUMBAR: ICD-10-CM

## 2023-11-07 DIAGNOSIS — G89.4 CHRONIC PAIN DISORDER: ICD-10-CM

## 2023-11-07 NOTE — TELEPHONE ENCOUNTER
Pt calling requesting refill of Oxycodone    Last written 10/9/23  Last OV 8/22/23  Next OV 12/5/23  Last recommended OV NA     Please send [facility] [place]

## 2023-11-08 RX ORDER — OXYCODONE HYDROCHLORIDE AND ACETAMINOPHEN 5; 325 MG/1; MG/1
.5-1 TABLET ORAL EVERY 8 HOURS PRN
Qty: 60 TABLET | Refills: 0 | Status: SHIPPED | OUTPATIENT
Start: 2023-11-08 | End: 2023-12-08

## 2023-11-30 ENCOUNTER — HOSPITAL ENCOUNTER (OUTPATIENT)
Dept: CT IMAGING | Age: 73
Discharge: HOME | End: 2023-11-30
Payer: MEDICARE

## 2023-11-30 DIAGNOSIS — R61 NIGHT SWEATS: ICD-10-CM

## 2023-11-30 DIAGNOSIS — R23.2 HOT FLASHES: ICD-10-CM

## 2023-11-30 PROCEDURE — 6360000004 HC RX CONTRAST MEDICATION: Performed by: INTERNAL MEDICINE

## 2023-11-30 PROCEDURE — 74176 CT ABD & PELVIS W/O CONTRAST: CPT

## 2023-11-30 RX ADMIN — DIATRIZOATE MEGLUMINE AND DIATRIZOATE SODIUM 30 ML: 660; 100 LIQUID ORAL; RECTAL at 08:36

## 2023-12-05 ENCOUNTER — OFFICE VISIT (OUTPATIENT)
Dept: INTERNAL MEDICINE CLINIC | Age: 73
End: 2023-12-05

## 2023-12-05 VITALS
SYSTOLIC BLOOD PRESSURE: 134 MMHG | WEIGHT: 176.8 LBS | OXYGEN SATURATION: 96 % | HEART RATE: 60 BPM | DIASTOLIC BLOOD PRESSURE: 74 MMHG | BODY MASS INDEX: 28.54 KG/M2

## 2023-12-05 VITALS
DIASTOLIC BLOOD PRESSURE: 74 MMHG | HEART RATE: 60 BPM | SYSTOLIC BLOOD PRESSURE: 134 MMHG | BODY MASS INDEX: 28.54 KG/M2 | WEIGHT: 176.8 LBS | OXYGEN SATURATION: 96 %

## 2023-12-05 DIAGNOSIS — I34.0 NONRHEUMATIC MITRAL VALVE REGURGITATION: Primary | ICD-10-CM

## 2023-12-05 DIAGNOSIS — Z78.0 MENOPAUSE: ICD-10-CM

## 2023-12-05 DIAGNOSIS — I25.10 CORONARY ARTERY CALCIFICATION SEEN ON CT SCAN: ICD-10-CM

## 2023-12-05 DIAGNOSIS — N18.32 STAGE 3B CHRONIC KIDNEY DISEASE (HCC): ICD-10-CM

## 2023-12-05 DIAGNOSIS — E11.9 TYPE 2 DIABETES MELLITUS WITHOUT COMPLICATION, WITHOUT LONG-TERM CURRENT USE OF INSULIN (HCC): ICD-10-CM

## 2023-12-05 DIAGNOSIS — F41.9 ANXIETY AND DEPRESSION: ICD-10-CM

## 2023-12-05 DIAGNOSIS — F32.A ANXIETY AND DEPRESSION: ICD-10-CM

## 2023-12-05 DIAGNOSIS — Z00.00 MEDICARE ANNUAL WELLNESS VISIT, SUBSEQUENT: Primary | ICD-10-CM

## 2023-12-05 DIAGNOSIS — E04.1 THYROID NODULE: ICD-10-CM

## 2023-12-05 DIAGNOSIS — N18.31 STAGE 3A CHRONIC KIDNEY DISEASE (HCC): ICD-10-CM

## 2023-12-05 DIAGNOSIS — E55.9 VITAMIN D DEFICIENCY: ICD-10-CM

## 2023-12-05 DIAGNOSIS — G89.4 CHRONIC PAIN DISORDER: ICD-10-CM

## 2023-12-05 DIAGNOSIS — R56.9 SEIZURES (HCC): ICD-10-CM

## 2023-12-05 DIAGNOSIS — I10 ESSENTIAL HYPERTENSION: ICD-10-CM

## 2023-12-05 DIAGNOSIS — E78.5 HYPERLIPIDEMIA LDL GOAL <70: ICD-10-CM

## 2023-12-05 RX ORDER — METOPROLOL SUCCINATE 25 MG/1
25 TABLET, EXTENDED RELEASE ORAL 2 TIMES DAILY
Qty: 180 TABLET | Refills: 3 | Status: SHIPPED | OUTPATIENT
Start: 2023-12-05

## 2023-12-05 RX ORDER — ZOSTER VACCINE RECOMBINANT, ADJUVANTED 50 MCG/0.5
0.5 KIT INTRAMUSCULAR SEE ADMIN INSTRUCTIONS
Qty: 0.5 ML | Refills: 0 | Status: SHIPPED | OUTPATIENT
Start: 2023-12-05 | End: 2024-06-02

## 2023-12-05 ASSESSMENT — PATIENT HEALTH QUESTIONNAIRE - PHQ9
2. FEELING DOWN, DEPRESSED OR HOPELESS: 1
5. POOR APPETITE OR OVEREATING: 0
1. LITTLE INTEREST OR PLEASURE IN DOING THINGS: 1
9. THOUGHTS THAT YOU WOULD BE BETTER OFF DEAD, OR OF HURTING YOURSELF: 0
10. IF YOU CHECKED OFF ANY PROBLEMS, HOW DIFFICULT HAVE THESE PROBLEMS MADE IT FOR YOU TO DO YOUR WORK, TAKE CARE OF THINGS AT HOME, OR GET ALONG WITH OTHER PEOPLE: 0
SUM OF ALL RESPONSES TO PHQ9 QUESTIONS 1 & 2: 2
SUM OF ALL RESPONSES TO PHQ QUESTIONS 1-9: 10
SUM OF ALL RESPONSES TO PHQ QUESTIONS 1-9: 10
6. FEELING BAD ABOUT YOURSELF - OR THAT YOU ARE A FAILURE OR HAVE LET YOURSELF OR YOUR FAMILY DOWN: 1
8. MOVING OR SPEAKING SO SLOWLY THAT OTHER PEOPLE COULD HAVE NOTICED. OR THE OPPOSITE, BEING SO FIGETY OR RESTLESS THAT YOU HAVE BEEN MOVING AROUND A LOT MORE THAN USUAL: 0
7. TROUBLE CONCENTRATING ON THINGS, SUCH AS READING THE NEWSPAPER OR WATCHING TELEVISION: 3
3. TROUBLE FALLING OR STAYING ASLEEP: 1
SUM OF ALL RESPONSES TO PHQ QUESTIONS 1-9: 10
SUM OF ALL RESPONSES TO PHQ QUESTIONS 1-9: 10
4. FEELING TIRED OR HAVING LITTLE ENERGY: 3

## 2023-12-05 ASSESSMENT — LIFESTYLE VARIABLES
HOW OFTEN DO YOU HAVE A DRINK CONTAINING ALCOHOL: NEVER
HOW MANY STANDARD DRINKS CONTAINING ALCOHOL DO YOU HAVE ON A TYPICAL DAY: PATIENT DOES NOT DRINK

## 2023-12-05 NOTE — PROGRESS NOTES
glucose. 100 strip 3    Lancets MISC 1 each by Does not apply route daily 100 each 5    niacin 500 MG extended release capsule Take 1 capsule by mouth nightly      CPAP Machine MISC by Does not apply route      venlafaxine (EFFEXOR XR) 150 MG extended release capsule Take 1 capsule by mouth daily 90 capsule 3     No current facility-administered medications for this visit. Return in about 3 months (around 3/5/2024).

## 2023-12-08 ENCOUNTER — TELEPHONE (OUTPATIENT)
Dept: INTERNAL MEDICINE CLINIC | Age: 73
End: 2023-12-08

## 2023-12-08 DIAGNOSIS — F32.A ANXIETY AND DEPRESSION: ICD-10-CM

## 2023-12-08 DIAGNOSIS — F41.9 ANXIETY AND DEPRESSION: ICD-10-CM

## 2023-12-08 RX ORDER — VENLAFAXINE HYDROCHLORIDE 150 MG/1
150 CAPSULE, EXTENDED RELEASE ORAL DAILY
Qty: 90 CAPSULE | Refills: 3 | Status: SHIPPED | OUTPATIENT
Start: 2023-12-08

## 2023-12-08 NOTE — TELEPHONE ENCOUNTER
This PA team received a refill request VIA fax from [employer]. The patient needs a refill of VENLAFAXINE 75MG. The refill request is available in Media. If this requires a response please respond to the pool ( [phone]). Thank you please advise patient.

## 2023-12-10 PROBLEM — N18.31 STAGE 3A CHRONIC KIDNEY DISEASE (HCC): Status: ACTIVE | Noted: 2022-08-28

## 2023-12-10 ASSESSMENT — ENCOUNTER SYMPTOMS
DIARRHEA: 0
CONSTIPATION: 0
SHORTNESS OF BREATH: 0
CHEST TIGHTNESS: 0

## 2024-01-08 DIAGNOSIS — M51.36 DDD (DEGENERATIVE DISC DISEASE), LUMBAR: ICD-10-CM

## 2024-01-08 DIAGNOSIS — G89.4 CHRONIC PAIN DISORDER: ICD-10-CM

## 2024-01-08 RX ORDER — AMLODIPINE BESYLATE 10 MG/1
10 TABLET ORAL DAILY
Qty: 90 TABLET | Refills: 3 | Status: SHIPPED | OUTPATIENT
Start: 2024-01-08

## 2024-01-08 RX ORDER — OXYCODONE HYDROCHLORIDE AND ACETAMINOPHEN 5; 325 MG/1; MG/1
.5-1 TABLET ORAL EVERY 8 HOURS PRN
Qty: 90 TABLET | Refills: 0 | Status: SHIPPED | OUTPATIENT
Start: 2024-01-08 | End: 2024-02-07

## 2024-01-08 NOTE — TELEPHONE ENCOUNTER
Pt calling requesting refill of   - oxyCODONE-acetaminophen (PERCOCET) 5-325 MG per tablet   - amLODIPine (NORVASC) 10 MG tablet     [facility] [address]    Please send amlodipine to [facility].

## 2024-02-23 DIAGNOSIS — M51.36 DDD (DEGENERATIVE DISC DISEASE), LUMBAR: ICD-10-CM

## 2024-02-23 DIAGNOSIS — G89.4 CHRONIC PAIN DISORDER: ICD-10-CM

## 2024-02-23 RX ORDER — OXYCODONE HYDROCHLORIDE AND ACETAMINOPHEN 5; 325 MG/1; MG/1
.5-1 TABLET ORAL EVERY 8 HOURS PRN
Qty: 90 TABLET | Refills: 0 | Status: SHIPPED | OUTPATIENT
Start: 2024-02-23 | End: 2024-03-24

## 2024-02-23 NOTE — TELEPHONE ENCOUNTER
Last OV: 12/5/2023  Next OV: 3/5/2024    Next appointment due:around 3/5/2024     Last fill:1/8/24  Refills:0#90

## 2024-02-23 NOTE — TELEPHONE ENCOUNTER
Medication: oxyCODONE-acetaminophen (PERCOCET) 5-325 MG per tablet     Last Filled:  1/8/24    Patient Pharmacy: [facility]/pharmacy #[id number] - [place] - [address] - P [phone] - F [phone]   [address] [place] [address]     Patient Phone Number: [phone] (home)     Last appt: 12/5/2023   Next appt: 3/5/2024    Last OARRS:       12/30/2022    12:57 PM   RX Monitoring   Periodic Controlled Substance Monitoring Obtaining appropriate analgesic effect of treatment.       Last Labs DM:   Lab Results   Component Value Date/Time    LABA1C 6.0 10/30/2023 12:22 PM     Last Lipid:   Lab Results   Component Value Date/Time    CHOL 148 10/30/2023 12:22 PM    TRIG 176 10/30/2023 12:22 PM    HDL 59 10/30/2023 12:22 PM    LDLCALC 54 10/30/2023 12:22 PM     Last PSA: No results found for: \"PSA\"  Last Thyroid: No results found for: \"TSH\", \"FT3\", \"U9OHTPL\", \"T4FREE\", \"Z8JKHSP\"   chest pain

## 2024-03-05 ENCOUNTER — OFFICE VISIT (OUTPATIENT)
Dept: INTERNAL MEDICINE CLINIC | Age: 74
End: 2024-03-05
Payer: MEDICARE

## 2024-03-05 VITALS
HEART RATE: 76 BPM | WEIGHT: 177 LBS | BODY MASS INDEX: 28.57 KG/M2 | SYSTOLIC BLOOD PRESSURE: 132 MMHG | DIASTOLIC BLOOD PRESSURE: 78 MMHG | OXYGEN SATURATION: 96 %

## 2024-03-05 DIAGNOSIS — I25.10 CORONARY ARTERY CALCIFICATION SEEN ON CT SCAN: ICD-10-CM

## 2024-03-05 DIAGNOSIS — E11.9 TYPE 2 DIABETES MELLITUS WITHOUT COMPLICATION, WITHOUT LONG-TERM CURRENT USE OF INSULIN (HCC): ICD-10-CM

## 2024-03-05 DIAGNOSIS — N18.32 STAGE 3B CHRONIC KIDNEY DISEASE (HCC): ICD-10-CM

## 2024-03-05 DIAGNOSIS — E78.5 HYPERLIPIDEMIA LDL GOAL <70: ICD-10-CM

## 2024-03-05 DIAGNOSIS — E55.9 VITAMIN D DEFICIENCY: ICD-10-CM

## 2024-03-05 DIAGNOSIS — R61 NIGHT SWEATS: ICD-10-CM

## 2024-03-05 DIAGNOSIS — I34.0 NONRHEUMATIC MITRAL VALVE REGURGITATION: ICD-10-CM

## 2024-03-05 DIAGNOSIS — R90.82 WHITE MATTER DISEASE: ICD-10-CM

## 2024-03-05 DIAGNOSIS — R56.9 SEIZURES (HCC): ICD-10-CM

## 2024-03-05 DIAGNOSIS — I10 ESSENTIAL HYPERTENSION: Primary | ICD-10-CM

## 2024-03-05 DIAGNOSIS — F32.A ANXIETY AND DEPRESSION: ICD-10-CM

## 2024-03-05 DIAGNOSIS — I48.0 PAROXYSMAL ATRIAL FIBRILLATION (HCC): ICD-10-CM

## 2024-03-05 DIAGNOSIS — F41.9 ANXIETY AND DEPRESSION: ICD-10-CM

## 2024-03-05 DIAGNOSIS — N18.31 STAGE 3A CHRONIC KIDNEY DISEASE (HCC): ICD-10-CM

## 2024-03-05 DIAGNOSIS — R91.1 PULMONARY NODULE: ICD-10-CM

## 2024-03-05 DIAGNOSIS — G47.33 OSA (OBSTRUCTIVE SLEEP APNEA): ICD-10-CM

## 2024-03-05 DIAGNOSIS — G89.4 CHRONIC PAIN DISORDER: ICD-10-CM

## 2024-03-05 DIAGNOSIS — R23.2 HOT FLASHES: ICD-10-CM

## 2024-03-05 PROBLEM — R00.1 BRADYCARDIA: Status: RESOLVED | Noted: 2021-02-26 | Resolved: 2024-03-05

## 2024-03-05 LAB
25(OH)D3 SERPL-MCNC: 57 NG/ML
BASOPHILS # BLD: 0.1 K/UL (ref 0–0.2)
BASOPHILS NFR BLD: 0.7 %
DEPRECATED RDW RBC AUTO: 13.6 % (ref 12.4–15.4)
EOSINOPHIL # BLD: 0.1 K/UL (ref 0–0.6)
EOSINOPHIL NFR BLD: 1.1 %
HCT VFR BLD AUTO: 44.7 % (ref 36–48)
HGB BLD-MCNC: 14.7 G/DL (ref 12–16)
LYMPHOCYTES # BLD: 2 K/UL (ref 1–5.1)
LYMPHOCYTES NFR BLD: 18.8 %
MCH RBC QN AUTO: 28.3 PG (ref 26–34)
MCHC RBC AUTO-ENTMCNC: 33 G/DL (ref 31–36)
MCV RBC AUTO: 85.8 FL (ref 80–100)
MONOCYTES # BLD: 0.9 K/UL (ref 0–1.3)
MONOCYTES NFR BLD: 8.2 %
NEUTROPHILS # BLD: 7.6 K/UL (ref 1.7–7.7)
NEUTROPHILS NFR BLD: 71.2 %
PLATELET # BLD AUTO: 266 K/UL (ref 135–450)
PMV BLD AUTO: 8.6 FL (ref 5–10.5)
RBC # BLD AUTO: 5.21 M/UL (ref 4–5.2)
WBC # BLD AUTO: 10.7 K/UL (ref 4–11)

## 2024-03-05 PROCEDURE — G8427 DOCREV CUR MEDS BY ELIG CLIN: HCPCS | Performed by: INTERNAL MEDICINE

## 2024-03-05 PROCEDURE — 2022F DILAT RTA XM EVC RTNOPTHY: CPT | Performed by: INTERNAL MEDICINE

## 2024-03-05 PROCEDURE — 1123F ACP DISCUSS/DSCN MKR DOCD: CPT | Performed by: INTERNAL MEDICINE

## 2024-03-05 PROCEDURE — G8484 FLU IMMUNIZE NO ADMIN: HCPCS | Performed by: INTERNAL MEDICINE

## 2024-03-05 PROCEDURE — 1090F PRES/ABSN URINE INCON ASSESS: CPT | Performed by: INTERNAL MEDICINE

## 2024-03-05 PROCEDURE — 3017F COLORECTAL CA SCREEN DOC REV: CPT | Performed by: INTERNAL MEDICINE

## 2024-03-05 PROCEDURE — 3078F DIAST BP <80 MM HG: CPT | Performed by: INTERNAL MEDICINE

## 2024-03-05 PROCEDURE — 3046F HEMOGLOBIN A1C LEVEL >9.0%: CPT | Performed by: INTERNAL MEDICINE

## 2024-03-05 PROCEDURE — 99215 OFFICE O/P EST HI 40 MIN: CPT | Performed by: INTERNAL MEDICINE

## 2024-03-05 PROCEDURE — G8417 CALC BMI ABV UP PARAM F/U: HCPCS | Performed by: INTERNAL MEDICINE

## 2024-03-05 PROCEDURE — G8400 PT W/DXA NO RESULTS DOC: HCPCS | Performed by: INTERNAL MEDICINE

## 2024-03-05 PROCEDURE — 1036F TOBACCO NON-USER: CPT | Performed by: INTERNAL MEDICINE

## 2024-03-05 PROCEDURE — 3075F SYST BP GE 130 - 139MM HG: CPT | Performed by: INTERNAL MEDICINE

## 2024-03-05 RX ORDER — GABAPENTIN 100 MG/1
CAPSULE ORAL
Qty: 21 CAPSULE | Refills: 0 | Status: SHIPPED | OUTPATIENT
Start: 2024-03-05 | End: 2024-03-19

## 2024-03-05 SDOH — ECONOMIC STABILITY: FOOD INSECURITY: WITHIN THE PAST 12 MONTHS, THE FOOD YOU BOUGHT JUST DIDN'T LAST AND YOU DIDN'T HAVE MONEY TO GET MORE.: NEVER TRUE

## 2024-03-05 SDOH — ECONOMIC STABILITY: INCOME INSECURITY: HOW HARD IS IT FOR YOU TO PAY FOR THE VERY BASICS LIKE FOOD, HOUSING, MEDICAL CARE, AND HEATING?: NOT HARD AT ALL

## 2024-03-05 SDOH — ECONOMIC STABILITY: FOOD INSECURITY: WITHIN THE PAST 12 MONTHS, YOU WORRIED THAT YOUR FOOD WOULD RUN OUT BEFORE YOU GOT MONEY TO BUY MORE.: NEVER TRUE

## 2024-03-05 ASSESSMENT — PATIENT HEALTH QUESTIONNAIRE - PHQ9
SUM OF ALL RESPONSES TO PHQ QUESTIONS 1-9: 11
SUM OF ALL RESPONSES TO PHQ QUESTIONS 1-9: 11
10. IF YOU CHECKED OFF ANY PROBLEMS, HOW DIFFICULT HAVE THESE PROBLEMS MADE IT FOR YOU TO DO YOUR WORK, TAKE CARE OF THINGS AT HOME, OR GET ALONG WITH OTHER PEOPLE: 0
4. FEELING TIRED OR HAVING LITTLE ENERGY: 3
SUM OF ALL RESPONSES TO PHQ9 QUESTIONS 1 & 2: 2
SUM OF ALL RESPONSES TO PHQ QUESTIONS 1-9: 11
7. TROUBLE CONCENTRATING ON THINGS, SUCH AS READING THE NEWSPAPER OR WATCHING TELEVISION: 3
5. POOR APPETITE OR OVEREATING: 0
3. TROUBLE FALLING OR STAYING ASLEEP: 3
9. THOUGHTS THAT YOU WOULD BE BETTER OFF DEAD, OR OF HURTING YOURSELF: 0
8. MOVING OR SPEAKING SO SLOWLY THAT OTHER PEOPLE COULD HAVE NOTICED. OR THE OPPOSITE, BEING SO FIGETY OR RESTLESS THAT YOU HAVE BEEN MOVING AROUND A LOT MORE THAN USUAL: 0
6. FEELING BAD ABOUT YOURSELF - OR THAT YOU ARE A FAILURE OR HAVE LET YOURSELF OR YOUR FAMILY DOWN: 0
1. LITTLE INTEREST OR PLEASURE IN DOING THINGS: 1
SUM OF ALL RESPONSES TO PHQ QUESTIONS 1-9: 11
2. FEELING DOWN, DEPRESSED OR HOPELESS: 1

## 2024-03-05 ASSESSMENT — ENCOUNTER SYMPTOMS
CONSTIPATION: 0
CHEST TIGHTNESS: 0
DIARRHEA: 0
SHORTNESS OF BREATH: 0

## 2024-03-05 NOTE — PATIENT INSTRUCTIONS
Try splitting your dose of Percocet (oxycodone) during the day.   - take 1/2 pill 4 times daily and 1 pill at bedtime.     To stop taking gabapentin, I have sent in gabapentin 100 mg pills to [facility] in [place].   Take 2 pills at bedtime for 1 week then 1 pill at bedtime for 1 week then stop. Please let me know if your pain worsens.     Dr. Nance is your thyroid doctor.  You

## 2024-03-05 NOTE — PROGRESS NOTES
Patient: Maile Obrien is a 73 y.o. female who presents today with the following Chief Complaint(s):  Chief Complaint   Patient presents with    3 Month Follow-Up       HPI    Here today for follow up.     Due for labs today. Will get done after her visit.     HTN- no issues with blood pressure. Did see Dr. Brown in November who recommended that she resume Micardis 80 mg qd and decreased metoprolol to 50 mg qd (taking 25 mg BID currently).  Did not resume Micardis. Will f/u again with Dr. Brown in 1 year.     CKD-   Did see Dr. Brown in November who recommended that she resume Micardis 80 mg qd and decreased metoprolol to 50 mg qd (taking 25 mg BID currently).  Did not resume Micardis. Will f/u again with Dr. Brown in 1 year.     DM- doing well on Jardiance. No side effects.     Not getting a lot of relief from Percocet- does make her sleepy. Taking 5/325 mg BID.     Not sure if gabapentin is helping or not.     Still getting sweaty from time to time. Does not check blood sugar when this happens.     Scheduled for echo on Friday for f/u on MR.     Has had 4 falls in the past few months. 2 in her home, 2 out of her home. Will consider PT but does not want to agree to it just yet.     Depression higher currently- son is back in senior care. Still active in [facility].     Does have shortness of breath. Started a few months ago. Had normal stress in March 2023.     Still very fatigued. Is compliant with CPAP. Due for f/u soon. \"I love that thing\".     Stress 3/23/23:  Summary Pharmacological Stress/MPI Results: 1. Technically a satisfactory study. 2. No evidence of Ischemia by Myocardial Perfusion Imaging. 3. Gated Study shows normal LV size and Systolic function; EF is 70 %.    CT Result (most recent):  CT CHEST ABDOMEN PELVIS WO CONTRAST 11/30/2023    Narrative  EXAMINATION:  CT OF THE CHEST, ABDOMEN, AND PELVIS WITHOUT CONTRAST 11/30/2023 8:31 am    TECHNIQUE:  CT of the chest, abdomen and pelvis was performed without the

## 2024-03-06 LAB
ALBUMIN SERPL-MCNC: 4.4 G/DL (ref 3.4–5)
ALBUMIN/GLOB SERPL: 1.4 {RATIO} (ref 1.1–2.2)
ALP SERPL-CCNC: 99 U/L (ref 40–129)
ALT SERPL-CCNC: 35 U/L (ref 10–40)
ANION GAP SERPL CALCULATED.3IONS-SCNC: 11 MMOL/L (ref 3–16)
AST SERPL-CCNC: 30 U/L (ref 15–37)
BILIRUB SERPL-MCNC: 0.3 MG/DL (ref 0–1)
BUN SERPL-MCNC: 19 MG/DL (ref 7–20)
CALCIUM SERPL-MCNC: 9.5 MG/DL (ref 8.3–10.6)
CHLORIDE SERPL-SCNC: 101 MMOL/L (ref 99–110)
CHOLEST SERPL-MCNC: 169 MG/DL (ref 0–199)
CO2 SERPL-SCNC: 26 MMOL/L (ref 21–32)
CREAT SERPL-MCNC: 1.2 MG/DL (ref 0.6–1.2)
EST. AVERAGE GLUCOSE BLD GHB EST-MCNC: 131.2 MG/DL
GFR SERPLBLD CREATININE-BSD FMLA CKD-EPI: 48 ML/MIN/{1.73_M2}
GLUCOSE SERPL-MCNC: 162 MG/DL (ref 70–99)
HBA1C MFR BLD: 6.2 %
HDLC SERPL-MCNC: 59 MG/DL (ref 40–60)
LDLC SERPL CALC-MCNC: 80 MG/DL
POTASSIUM SERPL-SCNC: 3.2 MMOL/L (ref 3.5–5.1)
PROT SERPL-MCNC: 7.5 G/DL (ref 6.4–8.2)
SODIUM SERPL-SCNC: 138 MMOL/L (ref 136–145)
TRIGL SERPL-MCNC: 152 MG/DL (ref 0–150)
VLDLC SERPL CALC-MCNC: 30 MG/DL

## 2024-03-06 NOTE — ASSESSMENT & PLAN NOTE
Doing well on Effexor  mg daily.  Has noticed some increased depression recently relating to family stress.  Does have good social support to [place].

## 2024-03-06 NOTE — ASSESSMENT & PLAN NOTE
Established with Dr. Weller for cardiology annual 2023.  Scheduled for echocardiogram later this week.  Did have normal Myoview stress test March 2023.  Continue working on blood pressure, cholesterol, diabetes control for risk modification.

## 2024-03-06 NOTE — ASSESSMENT & PLAN NOTE
Well-controlled.  Remains on Toprol ER 20 mg twice daily amlodipine 10 mg daily.  She does not think she is taking Micardis but will confirm this at home.  Per Dr. Brown's note in November 2023 she is supposed to be also taking Micardis 80 mg daily.

## 2024-03-06 NOTE — ASSESSMENT & PLAN NOTE
CT of the chest, abdomen, and pelvis in August 2023 were unremarkable.  No evidence of lymphoma.

## 2024-03-06 NOTE — ASSESSMENT & PLAN NOTE
Due for labs.  Check hemoglobin A1c.  Remains on Jardiance 25 mg daily, metformin was discontinued at her appointment in December.

## 2024-03-06 NOTE — ASSESSMENT & PLAN NOTE
Most recent CT of chest was in August 2023 which did show stability of pulmonary nodule.  Is due for follow-up pulmonology this month.

## 2024-03-06 NOTE — ASSESSMENT & PLAN NOTE
Unclear if gabapentin is actually helping with her pain at all.  Will wean off of gabapentin by decreasing to 200 mg at at bedtime for 2 week then 100 mg at bedtime for 1 week then discontinuing gabapentin.  If she does notice that her pain worsens with discontinuing gabapentin, she will reach out and we will have her resume gabapentin.  With regards to Percocet, recommend continuing Percocet 5/320 mg at bedtime and may try taking Percocet in half during the day to increase the frequency of dosing.

## 2024-03-10 DIAGNOSIS — E87.6 HYPOKALEMIA: Primary | ICD-10-CM

## 2024-03-10 RX ORDER — SPIRONOLACTONE 25 MG/1
12.5 TABLET ORAL DAILY
Qty: 15 TABLET | Refills: 5 | Status: SHIPPED | OUTPATIENT
Start: 2024-03-10

## 2024-04-01 ENCOUNTER — HOSPITAL ENCOUNTER (OUTPATIENT)
Dept: CARDIOLOGY | Age: 74
Discharge: HOME | End: 2024-04-01
Payer: MEDICARE

## 2024-04-01 DIAGNOSIS — I34.0 NONRHEUMATIC MITRAL VALVE REGURGITATION: ICD-10-CM

## 2024-04-01 PROCEDURE — 93306 TTE W/DOPPLER COMPLETE: CPT

## 2024-04-03 ENCOUNTER — TELEPHONE (OUTPATIENT)
Dept: INTERNAL MEDICINE CLINIC | Age: 74
End: 2024-04-03

## 2024-04-03 DIAGNOSIS — M51.36 DDD (DEGENERATIVE DISC DISEASE), LUMBAR: ICD-10-CM

## 2024-04-03 DIAGNOSIS — G89.4 CHRONIC PAIN DISORDER: ICD-10-CM

## 2024-04-03 RX ORDER — OXYCODONE HYDROCHLORIDE AND ACETAMINOPHEN 5; 325 MG/1; MG/1
.5-1 TABLET ORAL EVERY 8 HOURS PRN
Qty: 90 TABLET | Refills: 0 | Status: SHIPPED | OUTPATIENT
Start: 2024-04-03 | End: 2024-05-03

## 2024-04-03 NOTE — TELEPHONE ENCOUNTER
Pt  calling requesting refill of Oxycodone    Last written 2/23/24  Last OV 3/5/24  Next OV 6/11/24  Last recommended OV NA     [facility] to    [facility] [address] [place]

## 2024-04-05 DIAGNOSIS — F41.9 ANXIETY AND DEPRESSION: ICD-10-CM

## 2024-04-05 DIAGNOSIS — F32.A ANXIETY AND DEPRESSION: ICD-10-CM

## 2024-04-05 RX ORDER — VENLAFAXINE HYDROCHLORIDE 150 MG/1
CAPSULE, EXTENDED RELEASE ORAL DAILY
Qty: 90 CAPSULE | Refills: 1 | Status: SHIPPED | OUTPATIENT
Start: 2024-04-05

## 2024-04-05 RX ORDER — GABAPENTIN 100 MG/1
CAPSULE ORAL
Refills: 0 | OUTPATIENT
Start: 2024-04-05

## 2024-04-05 NOTE — TELEPHONE ENCOUNTER
Gabapentin was discontinued per my note from 3/5/24. Can you please see if she is asking to resume it? Thanks

## 2024-05-13 DIAGNOSIS — M51.36 DDD (DEGENERATIVE DISC DISEASE), LUMBAR: ICD-10-CM

## 2024-05-13 DIAGNOSIS — G89.4 CHRONIC PAIN DISORDER: ICD-10-CM

## 2024-05-14 ENCOUNTER — OFFICE VISIT (OUTPATIENT)
Dept: ENT CLINIC | Age: 74
End: 2024-05-14
Payer: MEDICARE

## 2024-05-14 VITALS
WEIGHT: 175 LBS | BODY MASS INDEX: 28.12 KG/M2 | OXYGEN SATURATION: 97 % | DIASTOLIC BLOOD PRESSURE: 79 MMHG | HEIGHT: 66 IN | SYSTOLIC BLOOD PRESSURE: 147 MMHG | HEART RATE: 92 BPM

## 2024-05-14 DIAGNOSIS — D48.5 NEOPLASM OF UNCERTAIN BEHAVIOR OF SKIN: ICD-10-CM

## 2024-05-14 DIAGNOSIS — E04.1 THYROID NODULE: Primary | ICD-10-CM

## 2024-05-14 PROBLEM — F11.20 OPIOID DEPENDENCE WITH CURRENT USE (HCC): Status: ACTIVE | Noted: 2024-05-14

## 2024-05-14 PROCEDURE — G8417 CALC BMI ABV UP PARAM F/U: HCPCS | Performed by: STUDENT IN AN ORGANIZED HEALTH CARE EDUCATION/TRAINING PROGRAM

## 2024-05-14 PROCEDURE — 1090F PRES/ABSN URINE INCON ASSESS: CPT | Performed by: STUDENT IN AN ORGANIZED HEALTH CARE EDUCATION/TRAINING PROGRAM

## 2024-05-14 PROCEDURE — 3077F SYST BP >= 140 MM HG: CPT | Performed by: STUDENT IN AN ORGANIZED HEALTH CARE EDUCATION/TRAINING PROGRAM

## 2024-05-14 PROCEDURE — 3017F COLORECTAL CA SCREEN DOC REV: CPT | Performed by: STUDENT IN AN ORGANIZED HEALTH CARE EDUCATION/TRAINING PROGRAM

## 2024-05-14 PROCEDURE — G8400 PT W/DXA NO RESULTS DOC: HCPCS | Performed by: STUDENT IN AN ORGANIZED HEALTH CARE EDUCATION/TRAINING PROGRAM

## 2024-05-14 PROCEDURE — 1036F TOBACCO NON-USER: CPT | Performed by: STUDENT IN AN ORGANIZED HEALTH CARE EDUCATION/TRAINING PROGRAM

## 2024-05-14 PROCEDURE — 1123F ACP DISCUSS/DSCN MKR DOCD: CPT | Performed by: STUDENT IN AN ORGANIZED HEALTH CARE EDUCATION/TRAINING PROGRAM

## 2024-05-14 PROCEDURE — 3078F DIAST BP <80 MM HG: CPT | Performed by: STUDENT IN AN ORGANIZED HEALTH CARE EDUCATION/TRAINING PROGRAM

## 2024-05-14 PROCEDURE — G8427 DOCREV CUR MEDS BY ELIG CLIN: HCPCS | Performed by: STUDENT IN AN ORGANIZED HEALTH CARE EDUCATION/TRAINING PROGRAM

## 2024-05-14 PROCEDURE — 99213 OFFICE O/P EST LOW 20 MIN: CPT | Performed by: STUDENT IN AN ORGANIZED HEALTH CARE EDUCATION/TRAINING PROGRAM

## 2024-05-14 RX ORDER — OXYCODONE HYDROCHLORIDE AND ACETAMINOPHEN 5; 325 MG/1; MG/1
.5-1 TABLET ORAL EVERY 8 HOURS PRN
Qty: 90 TABLET | Refills: 0 | Status: SHIPPED | OUTPATIENT
Start: 2024-05-14 | End: 2024-06-13

## 2024-05-14 NOTE — PROGRESS NOTES
[facility]  DIVISION OF OTOLARYNGOLOGY- HEAD & NECK SURGERY        Maile Obrien (:  1950) is a 74 y.o. female, here for evaluation of the following chief complaint(s):  Thyroid Problem and Follow-up      ASSESSMENT/PLAN:  1. Thyroid nodule  -     US THYROID; Future  2. Neoplasm of uncertain behavior of skin      This is a very pleasant 74 y.o. female here today for evaluation of the the above-noted complaints.      I reviewed the patient's thyroid ultrasound and it shows evidence of a 2 cm TI-RADS 4 nodule in the isthmus of the thyroid.  Her most recent TSH was normal in 2021 at 1.26.     She had an updated thyroid ultrasound in 2022.  There was no interval growth.    I had previously recommended a thyroid biopsy.  The patient declined at that time due to other health issues.  I think the best step is to get an updated thyroid ultrasound.  Unless there has been significant decrease in size, we will still need to do a biopsy.    For facial lesions, these have not grown significantly since I saw her last.  They do appear benign.  Plan to monitor and remove if they become inflamed or irritate her.    Medical Decision Making:  The following items were considered in medical decision making:  Independent review of images  Review / order clinical lab tests  Review / order radiology tests  Decision to obtain old records  Review and summation of old records as accessed through [employer] if applicable    SUBJECTIVE/OBJECTIVE:  CARLEE Obrien is here today for evaluation of issues related to her thyroid.  The patient states that she recently developed seizures.  Since that time she has developed memory loss and tremors.  She had an extensive work-up done and it revealed an underlying thyroid nodule as one of her scans.  I reviewed that today.  I also reviewed recent blood work.  She reports no issue related to her thyroid in the past.  She has had normal thyroid stimulating hormones.

## 2024-06-09 PROBLEM — Z86.16 HISTORY OF COVID-19: Status: RESOLVED | Noted: 2021-02-28 | Resolved: 2024-06-09

## 2024-06-09 PROBLEM — N18.32 STAGE 3B CHRONIC KIDNEY DISEASE (HCC): Status: ACTIVE | Noted: 2024-06-09

## 2024-06-09 PROBLEM — R61 NIGHT SWEATS: Status: RESOLVED | Noted: 2023-08-27 | Resolved: 2024-06-09

## 2024-06-09 PROBLEM — N18.32 STAGE 3B CHRONIC KIDNEY DISEASE (HCC): Status: ACTIVE | Noted: 2022-08-28

## 2024-06-09 PROBLEM — R23.2 HOT FLASHES: Status: RESOLVED | Noted: 2023-08-27 | Resolved: 2024-06-09

## 2024-06-11 ENCOUNTER — OFFICE VISIT (OUTPATIENT)
Dept: INTERNAL MEDICINE CLINIC | Age: 74
End: 2024-06-11

## 2024-06-11 VITALS
DIASTOLIC BLOOD PRESSURE: 80 MMHG | WEIGHT: 175.6 LBS | SYSTOLIC BLOOD PRESSURE: 136 MMHG | HEIGHT: 66 IN | BODY MASS INDEX: 28.22 KG/M2 | HEART RATE: 75 BPM | OXYGEN SATURATION: 97 %

## 2024-06-11 DIAGNOSIS — I10 ESSENTIAL HYPERTENSION: ICD-10-CM

## 2024-06-11 DIAGNOSIS — E11.9 TYPE 2 DIABETES MELLITUS WITHOUT COMPLICATION, WITHOUT LONG-TERM CURRENT USE OF INSULIN (HCC): ICD-10-CM

## 2024-06-11 DIAGNOSIS — M51.36 DDD (DEGENERATIVE DISC DISEASE), LUMBAR: ICD-10-CM

## 2024-06-11 DIAGNOSIS — M54.12 CERVICAL RADICULOPATHY: ICD-10-CM

## 2024-06-11 DIAGNOSIS — F41.9 ANXIETY AND DEPRESSION: ICD-10-CM

## 2024-06-11 DIAGNOSIS — E55.9 VITAMIN D DEFICIENCY: ICD-10-CM

## 2024-06-11 DIAGNOSIS — E04.1 THYROID NODULE: ICD-10-CM

## 2024-06-11 DIAGNOSIS — E87.6 HYPOKALEMIA: ICD-10-CM

## 2024-06-11 DIAGNOSIS — F11.20 OPIOID DEPENDENCE WITH CURRENT USE (HCC): Primary | ICD-10-CM

## 2024-06-11 DIAGNOSIS — Z79.899 MEDICATION MANAGEMENT: ICD-10-CM

## 2024-06-11 DIAGNOSIS — G89.4 CHRONIC PAIN DISORDER: ICD-10-CM

## 2024-06-11 DIAGNOSIS — E78.5 HYPERLIPIDEMIA LDL GOAL <70: ICD-10-CM

## 2024-06-11 DIAGNOSIS — F32.A ANXIETY AND DEPRESSION: ICD-10-CM

## 2024-06-11 DIAGNOSIS — N18.31 STAGE 3A CHRONIC KIDNEY DISEASE (HCC): ICD-10-CM

## 2024-06-11 DIAGNOSIS — N18.32 STAGE 3B CHRONIC KIDNEY DISEASE (HCC): ICD-10-CM

## 2024-06-11 LAB
BASOPHILS # BLD: 0.1 K/UL (ref 0–0.2)
BASOPHILS NFR BLD: 0.6 %
DEPRECATED RDW RBC AUTO: 14.8 % (ref 12.4–15.4)
EOSINOPHIL # BLD: 0.1 K/UL (ref 0–0.6)
EOSINOPHIL NFR BLD: 0.8 %
HCT VFR BLD AUTO: 42.2 % (ref 36–48)
HGB BLD-MCNC: 14.6 G/DL (ref 12–16)
LYMPHOCYTES # BLD: 1.7 K/UL (ref 1–5.1)
LYMPHOCYTES NFR BLD: 18.8 %
MCH RBC QN AUTO: 29.2 PG (ref 26–34)
MCHC RBC AUTO-ENTMCNC: 34.7 G/DL (ref 31–36)
MCV RBC AUTO: 84.1 FL (ref 80–100)
MONOCYTES # BLD: 0.5 K/UL (ref 0–1.3)
MONOCYTES NFR BLD: 5.5 %
NEUTROPHILS # BLD: 6.5 K/UL (ref 1.7–7.7)
NEUTROPHILS NFR BLD: 74.3 %
PLATELET # BLD AUTO: 291 K/UL (ref 135–450)
PMV BLD AUTO: 8.7 FL (ref 5–10.5)
RBC # BLD AUTO: 5.02 M/UL (ref 4–5.2)
WBC # BLD AUTO: 8.8 K/UL (ref 4–11)

## 2024-06-11 RX ORDER — METHOCARBAMOL 750 MG/1
372-750 TABLET, FILM COATED ORAL 3 TIMES DAILY PRN
Qty: 30 TABLET | Refills: 0 | Status: SHIPPED | OUTPATIENT
Start: 2024-06-11

## 2024-06-11 NOTE — PATIENT INSTRUCTIONS
Try methocarbamol 1/2-1 pill up to 3 times daily AS NEEDED for spasms. Do not take with Percocet - try to separate by at least 1 hour, ideally 2 hours.   Methocarbamol may make you sleepy so do not take when you need to be alert.     Try taking Percocet 1 pill 3 times daily.

## 2024-06-12 LAB
ALBUMIN SERPL-MCNC: 4.3 G/DL (ref 3.4–5)
ALBUMIN/GLOB SERPL: 1.4 {RATIO} (ref 1.1–2.2)
ALP SERPL-CCNC: 89 U/L (ref 40–129)
ALT SERPL-CCNC: 39 U/L (ref 10–40)
ANION GAP SERPL CALCULATED.3IONS-SCNC: 13 MMOL/L (ref 3–16)
AST SERPL-CCNC: 35 U/L (ref 15–37)
BILIRUB SERPL-MCNC: 0.4 MG/DL (ref 0–1)
BUN SERPL-MCNC: 18 MG/DL (ref 7–20)
CALCIUM SERPL-MCNC: 9.6 MG/DL (ref 8.3–10.6)
CHLORIDE SERPL-SCNC: 102 MMOL/L (ref 99–110)
CO2 SERPL-SCNC: 24 MMOL/L (ref 21–32)
CREAT SERPL-MCNC: 1.2 MG/DL (ref 0.6–1.2)
EST. AVERAGE GLUCOSE BLD GHB EST-MCNC: 137 MG/DL
GFR SERPLBLD CREATININE-BSD FMLA CKD-EPI: 47 ML/MIN/{1.73_M2}
GLUCOSE SERPL-MCNC: 200 MG/DL (ref 70–99)
HBA1C MFR BLD: 6.4 %
MAGNESIUM SERPL-MCNC: 1.9 MG/DL (ref 1.8–2.4)
POTASSIUM SERPL-SCNC: 3.8 MMOL/L (ref 3.5–5.1)
PROT SERPL-MCNC: 7.4 G/DL (ref 6.4–8.2)
SODIUM SERPL-SCNC: 139 MMOL/L (ref 136–145)
TSH SERPL DL<=0.005 MIU/L-ACNC: 0.59 UIU/ML (ref 0.27–4.2)

## 2024-06-17 DIAGNOSIS — E78.5 HYPERLIPIDEMIA LDL GOAL <100: ICD-10-CM

## 2024-06-17 RX ORDER — ROSUVASTATIN CALCIUM 20 MG/1
TABLET, COATED ORAL
Qty: 90 TABLET | Refills: 3 | Status: SHIPPED | OUTPATIENT
Start: 2024-06-17

## 2024-06-22 PROBLEM — N18.31 STAGE 3A CHRONIC KIDNEY DISEASE (HCC): Status: ACTIVE | Noted: 2024-06-22

## 2024-06-22 ASSESSMENT — ENCOUNTER SYMPTOMS
CHEST TIGHTNESS: 0
CONSTIPATION: 0
DIARRHEA: 0
SHORTNESS OF BREATH: 0

## 2024-06-22 NOTE — ASSESSMENT & PLAN NOTE
Patient tried to avoid use of narcotics for many years but did not get relief with nonnarcotic modalities and is not able to take NSAIDs due to chronic kidney disease.  Patient does not feel that she is addicted to Percocet and that is a big concern for her as her son does struggle with addiction.  Patient's  helps keep track of pain medicines for her and her medications are locked up safely in and out of reach from their son.

## 2024-06-22 NOTE — ASSESSMENT & PLAN NOTE
Increase Percocet 5/325 mg to 3 times daily to see if she has improved pain control.  Unable to take NSAIDs due to chronic kidney disease.  Gabapentin was discontinued in March due to ineffectiveness.

## 2024-06-22 NOTE — ASSESSMENT & PLAN NOTE
Spinal stimulator in place that has been ineffective.  Increase Percocet 5/325 mg to 3 times daily to see if she has improved pain control.  Unable to take NSAIDs due to chronic kidney disease.  Gabapentin was discontinued in March due to ineffectiveness.

## 2024-06-22 NOTE — ASSESSMENT & PLAN NOTE
Well-controlled.  Remains on Toprol ER 25 mg twice daily, amlodipine 10 mg daily, and spironolactone 12.5 mg daily.  Follows with Dr. Brown for nephrology.  Check CMP, CBC.

## 2024-06-22 NOTE — ASSESSMENT & PLAN NOTE
Secondary to hypertension and NSAID use.  No longer using NSAIDs.  Does follow with Dr. Brown for nephrology.  Due for labs.

## 2024-06-26 DIAGNOSIS — G89.4 CHRONIC PAIN DISORDER: ICD-10-CM

## 2024-06-26 DIAGNOSIS — M51.36 DDD (DEGENERATIVE DISC DISEASE), LUMBAR: ICD-10-CM

## 2024-06-29 RX ORDER — OXYCODONE AND ACETAMINOPHEN 5; 325 MG/1; MG/1
.5-1 TABLET ORAL EVERY 8 HOURS PRN
Qty: 90 TABLET | Refills: 0 | Status: SHIPPED | OUTPATIENT
Start: 2024-06-29 | End: 2024-07-31 | Stop reason: SDUPTHER

## 2024-07-03 ENCOUNTER — TELEPHONE (OUTPATIENT)
Dept: INTERNAL MEDICINE CLINIC | Age: 74
End: 2024-07-03

## 2024-07-03 NOTE — TELEPHONE ENCOUNTER
Pt calling in experiencing heartburn symptoms, burning sensation to stomach. She takes tums which does help temporarily. She was asking for advice on other treatment. She was advised that  is out this week but a message would be sent to her to address next week. She may or may not need to see her in office or pt can be scheduled with another provider if need be. Pt has Prilosc on file from 2022 as needed. She hasn't taken that lately but will try that to get her through the weekend. She will pick that up OTC.

## 2024-07-09 NOTE — TELEPHONE ENCOUNTER
Patient is doing much better she does have flare up here and there but feeling much better than what she was. Patient will continue with OTC prilosec

## 2024-07-11 ENCOUNTER — HOSPITAL ENCOUNTER (OUTPATIENT)
Dept: ULTRASOUND IMAGING | Age: 74
Discharge: HOME | End: 2024-07-11
Attending: STUDENT IN AN ORGANIZED HEALTH CARE EDUCATION/TRAINING PROGRAM
Payer: MEDICARE

## 2024-07-11 DIAGNOSIS — E04.1 THYROID NODULE: ICD-10-CM

## 2024-07-11 PROCEDURE — 76536 US EXAM OF HEAD AND NECK: CPT

## 2024-07-17 ENCOUNTER — TELEPHONE (OUTPATIENT)
Dept: ENT CLINIC | Age: 74
End: 2024-07-17

## 2024-07-17 NOTE — TELEPHONE ENCOUNTER
----- Message from Jeffrey Nance MD sent at 7/16/2024  3:50 PM EDT -----  Please call the patient and let her know that her thyroid nodule is stable in size.  I would still recommend a biopsy of this or removal.  We can call her with the results if she gets the biopsy.

## 2024-07-17 NOTE — TELEPHONE ENCOUNTER
Patient notified.  Patient states she would like to continue observation for now and will call if she decides to have biopsy.

## 2024-07-30 ENCOUNTER — TELEPHONE (OUTPATIENT)
Dept: INTERNAL MEDICINE CLINIC | Age: 74
End: 2024-07-30

## 2024-07-30 ENCOUNTER — HOSPITAL ENCOUNTER (OUTPATIENT)
Dept: WOMENS IMAGING | Age: 74
Discharge: HOME | End: 2024-07-30
Payer: MEDICARE

## 2024-07-30 VITALS — BODY MASS INDEX: 27.64 KG/M2 | WEIGHT: 172 LBS | HEIGHT: 66 IN

## 2024-07-30 DIAGNOSIS — Z12.31 BREAST CANCER SCREENING BY MAMMOGRAM: ICD-10-CM

## 2024-07-30 PROCEDURE — 77063 BREAST TOMOSYNTHESIS BI: CPT

## 2024-07-30 RX ORDER — METHOCARBAMOL 750 MG/1
372-750 TABLET, FILM COATED ORAL 3 TIMES DAILY PRN
Qty: 30 TABLET | Refills: 0 | Status: SHIPPED | OUTPATIENT
Start: 2024-07-30

## 2024-07-30 NOTE — TELEPHONE ENCOUNTER
Pt calling requesting refill of Methocarbamol    Last written 6/11/24  Last OV 6/11/24  Next OV 9/11/24  Last recommended OV NA     [facility] [address] [place]

## 2024-07-31 DIAGNOSIS — G89.4 CHRONIC PAIN DISORDER: ICD-10-CM

## 2024-07-31 DIAGNOSIS — M51.36 DDD (DEGENERATIVE DISC DISEASE), LUMBAR: ICD-10-CM

## 2024-07-31 RX ORDER — OXYCODONE HYDROCHLORIDE AND ACETAMINOPHEN 5; 325 MG/1; MG/1
.5-1 TABLET ORAL EVERY 8 HOURS PRN
Qty: 90 TABLET | Refills: 0 | Status: SHIPPED | OUTPATIENT
Start: 2024-07-31 | End: 2024-08-30

## 2024-08-05 DIAGNOSIS — E04.1 THYROID NODULE: Primary | ICD-10-CM

## 2024-08-07 DIAGNOSIS — I10 ESSENTIAL HYPERTENSION: ICD-10-CM

## 2024-08-11 NOTE — TELEPHONE ENCOUNTER
Please confirm with patient that she is no longer taking telmisartan. I do see in my notes as well that it was stopped.

## 2024-08-12 RX ORDER — TELMISARTAN 80 MG/1
80 TABLET ORAL DAILY
Qty: 90 TABLET | Refills: 3 | Status: SHIPPED | OUTPATIENT
Start: 2024-08-12

## 2024-08-16 RX ORDER — METHOCARBAMOL 750 MG/1
372-750 TABLET, FILM COATED ORAL 3 TIMES DAILY PRN
Qty: 30 TABLET | Refills: 0 | Status: SHIPPED | OUTPATIENT
Start: 2024-08-16

## 2024-08-16 NOTE — TELEPHONE ENCOUNTER
Pt  calling requesting refill of Methocarbamal    Last written 7/30/24  Last OV 6/11/24  Next OV 9/11/24  Last recommended OV NA     [facility] [place]

## 2024-08-21 ENCOUNTER — HOSPITAL ENCOUNTER (OUTPATIENT)
Dept: ULTRASOUND IMAGING | Age: 74
Discharge: HOME | End: 2024-08-21
Attending: STUDENT IN AN ORGANIZED HEALTH CARE EDUCATION/TRAINING PROGRAM
Payer: MEDICARE

## 2024-08-21 DIAGNOSIS — E04.1 THYROID NODULE: ICD-10-CM

## 2024-08-21 PROCEDURE — 60100 BIOPSY OF THYROID: CPT

## 2024-08-21 PROCEDURE — 88173 CYTOPATH EVAL FNA REPORT: CPT

## 2024-08-21 PROCEDURE — 88305 TISSUE EXAM BY PATHOLOGIST: CPT

## 2024-08-28 DIAGNOSIS — E04.1 THYROID NODULE: Primary | ICD-10-CM

## 2024-09-03 RX ORDER — METHOCARBAMOL 750 MG/1
372-750 TABLET, FILM COATED ORAL 3 TIMES DAILY PRN
Qty: 90 TABLET | Refills: 2 | Status: SHIPPED | OUTPATIENT
Start: 2024-09-03

## 2024-09-06 DIAGNOSIS — G89.4 CHRONIC PAIN DISORDER: ICD-10-CM

## 2024-09-06 DIAGNOSIS — M51.36 DDD (DEGENERATIVE DISC DISEASE), LUMBAR: ICD-10-CM

## 2024-09-06 RX ORDER — OXYCODONE AND ACETAMINOPHEN 5; 325 MG/1; MG/1
.5-1 TABLET ORAL EVERY 8 HOURS PRN
Qty: 90 TABLET | Refills: 0 | Status: SHIPPED | OUTPATIENT
Start: 2024-09-06 | End: 2024-10-06

## 2024-09-08 PROBLEM — N18.31 STAGE 3A CHRONIC KIDNEY DISEASE (HCC): Status: RESOLVED | Noted: 2024-06-22 | Resolved: 2024-09-08

## 2024-09-08 PROBLEM — E11.29 TYPE 2 DIABETES MELLITUS WITH KIDNEY COMPLICATION, WITHOUT LONG-TERM CURRENT USE OF INSULIN (HCC): Status: ACTIVE | Noted: 2018-07-06

## 2024-09-11 ENCOUNTER — OFFICE VISIT (OUTPATIENT)
Dept: INTERNAL MEDICINE CLINIC | Age: 74
End: 2024-09-11

## 2024-09-11 VITALS
SYSTOLIC BLOOD PRESSURE: 130 MMHG | WEIGHT: 174.4 LBS | BODY MASS INDEX: 28.15 KG/M2 | OXYGEN SATURATION: 97 % | DIASTOLIC BLOOD PRESSURE: 82 MMHG | HEART RATE: 83 BPM

## 2024-09-11 DIAGNOSIS — R41.3 MEMORY LOSS OR IMPAIRMENT: ICD-10-CM

## 2024-09-11 DIAGNOSIS — R90.82 WHITE MATTER DISEASE: ICD-10-CM

## 2024-09-11 DIAGNOSIS — E55.9 VITAMIN D DEFICIENCY: ICD-10-CM

## 2024-09-11 DIAGNOSIS — M51.36 DDD (DEGENERATIVE DISC DISEASE), LUMBAR: ICD-10-CM

## 2024-09-11 DIAGNOSIS — Z79.899 MEDICATION MANAGEMENT: ICD-10-CM

## 2024-09-11 DIAGNOSIS — F41.9 ANXIETY AND DEPRESSION: ICD-10-CM

## 2024-09-11 DIAGNOSIS — E78.5 HYPERLIPIDEMIA LDL GOAL <70: ICD-10-CM

## 2024-09-11 DIAGNOSIS — N18.32 STAGE 3B CHRONIC KIDNEY DISEASE (HCC): ICD-10-CM

## 2024-09-11 DIAGNOSIS — F32.A ANXIETY AND DEPRESSION: ICD-10-CM

## 2024-09-11 DIAGNOSIS — G47.33 OSA (OBSTRUCTIVE SLEEP APNEA): ICD-10-CM

## 2024-09-11 DIAGNOSIS — F11.20 OPIOID DEPENDENCE WITH CURRENT USE (HCC): ICD-10-CM

## 2024-09-11 DIAGNOSIS — I10 ESSENTIAL HYPERTENSION: ICD-10-CM

## 2024-09-11 DIAGNOSIS — N18.32 TYPE 2 DIABETES MELLITUS WITH STAGE 3B CHRONIC KIDNEY DISEASE, WITHOUT LONG-TERM CURRENT USE OF INSULIN (HCC): Primary | ICD-10-CM

## 2024-09-11 DIAGNOSIS — I48.0 PAROXYSMAL ATRIAL FIBRILLATION (HCC): ICD-10-CM

## 2024-09-11 DIAGNOSIS — R56.9 SEIZURES (HCC): ICD-10-CM

## 2024-09-11 DIAGNOSIS — E11.22 TYPE 2 DIABETES MELLITUS WITH STAGE 3B CHRONIC KIDNEY DISEASE, WITHOUT LONG-TERM CURRENT USE OF INSULIN (HCC): Primary | ICD-10-CM

## 2024-09-11 DIAGNOSIS — E04.1 THYROID NODULE: ICD-10-CM

## 2024-09-11 DIAGNOSIS — E11.9 TYPE 2 DIABETES MELLITUS WITHOUT COMPLICATION, WITHOUT LONG-TERM CURRENT USE OF INSULIN (HCC): ICD-10-CM

## 2024-09-11 LAB
25(OH)D3 SERPL-MCNC: 48.6 NG/ML
ALBUMIN SERPL-MCNC: 4.3 G/DL (ref 3.4–5)
ALBUMIN/GLOB SERPL: 1.5 {RATIO} (ref 1.1–2.2)
ALP SERPL-CCNC: 100 U/L (ref 40–129)
ALT SERPL-CCNC: 35 U/L (ref 10–40)
ANION GAP SERPL CALCULATED.3IONS-SCNC: 14 MMOL/L (ref 3–16)
AST SERPL-CCNC: 31 U/L (ref 15–37)
BASOPHILS # BLD: 0.1 K/UL (ref 0–0.2)
BASOPHILS NFR BLD: 0.7 %
BILIRUB SERPL-MCNC: 0.3 MG/DL (ref 0–1)
BUN SERPL-MCNC: 16 MG/DL (ref 7–20)
CALCIUM SERPL-MCNC: 9.7 MG/DL (ref 8.3–10.6)
CHLORIDE SERPL-SCNC: 101 MMOL/L (ref 99–110)
CHOLEST SERPL-MCNC: 196 MG/DL (ref 0–199)
CO2 SERPL-SCNC: 24 MMOL/L (ref 21–32)
CREAT SERPL-MCNC: 1.2 MG/DL (ref 0.6–1.2)
DEPRECATED RDW RBC AUTO: 13.9 % (ref 12.4–15.4)
EOSINOPHIL # BLD: 0.1 K/UL (ref 0–0.6)
EOSINOPHIL NFR BLD: 0.7 %
FOLATE SERPL-MCNC: 13.3 NG/ML (ref 4.78–24.2)
GFR SERPLBLD CREATININE-BSD FMLA CKD-EPI: 47 ML/MIN/{1.73_M2}
GLUCOSE SERPL-MCNC: 181 MG/DL (ref 70–99)
HBA1C MFR BLD: 6.1 %
HCT VFR BLD AUTO: 48.3 % (ref 36–48)
HDLC SERPL-MCNC: 70 MG/DL (ref 40–60)
HGB BLD-MCNC: 15.8 G/DL (ref 12–16)
LDLC SERPL CALC-MCNC: 87 MG/DL
LYMPHOCYTES # BLD: 1.3 K/UL (ref 1–5.1)
LYMPHOCYTES NFR BLD: 13 %
MAGNESIUM SERPL-MCNC: 1.75 MG/DL (ref 1.8–2.4)
MCH RBC QN AUTO: 28 PG (ref 26–34)
MCHC RBC AUTO-ENTMCNC: 32.7 G/DL (ref 31–36)
MCV RBC AUTO: 85.5 FL (ref 80–100)
MONOCYTES # BLD: 0.5 K/UL (ref 0–1.3)
MONOCYTES NFR BLD: 4.5 %
NEUTROPHILS # BLD: 8.2 K/UL (ref 1.7–7.7)
NEUTROPHILS NFR BLD: 81.1 %
PLATELET # BLD AUTO: 324 K/UL (ref 135–450)
PMV BLD AUTO: 8.7 FL (ref 5–10.5)
POTASSIUM SERPL-SCNC: 3.6 MMOL/L (ref 3.5–5.1)
PROT SERPL-MCNC: 7.1 G/DL (ref 6.4–8.2)
RBC # BLD AUTO: 5.64 M/UL (ref 4–5.2)
SODIUM SERPL-SCNC: 139 MMOL/L (ref 136–145)
TRIGL SERPL-MCNC: 193 MG/DL (ref 0–150)
TSH SERPL DL<=0.005 MIU/L-ACNC: 1.03 UIU/ML (ref 0.27–4.2)
VIT B12 SERPL-MCNC: 667 PG/ML (ref 211–911)
VLDLC SERPL CALC-MCNC: 39 MG/DL
WBC # BLD AUTO: 10.1 K/UL (ref 4–11)

## 2024-09-29 DIAGNOSIS — F41.9 ANXIETY AND DEPRESSION: ICD-10-CM

## 2024-09-29 DIAGNOSIS — F32.A ANXIETY AND DEPRESSION: ICD-10-CM

## 2024-09-30 RX ORDER — VENLAFAXINE HYDROCHLORIDE 150 MG/1
CAPSULE, EXTENDED RELEASE ORAL DAILY
Qty: 90 CAPSULE | Refills: 1 | Status: SHIPPED | OUTPATIENT
Start: 2024-09-30

## 2024-10-17 ENCOUNTER — TELEPHONE (OUTPATIENT)
Dept: INTERNAL MEDICINE CLINIC | Age: 74
End: 2024-10-17

## 2024-10-17 DIAGNOSIS — M51.369 DDD (DEGENERATIVE DISC DISEASE), LUMBAR: ICD-10-CM

## 2024-10-17 DIAGNOSIS — G89.4 CHRONIC PAIN DISORDER: ICD-10-CM

## 2024-10-17 RX ORDER — OXYCODONE AND ACETAMINOPHEN 5; 325 MG/1; MG/1
.5-1 TABLET ORAL EVERY 8 HOURS PRN
Qty: 90 TABLET | Refills: 0 | Status: SHIPPED | OUTPATIENT
Start: 2024-10-17 | End: 2024-11-16

## 2024-10-17 NOTE — TELEPHONE ENCOUNTER
Pt asking for refill of Oxycodone which she takes TID and has 8 pills left. Dr. Overton out today and tomorrow. Can a covering provider refill that for her? She said she can take 2 per day if need be if covering provider cannot fill. Please advise and let patient know, thanks.

## 2024-10-21 ENCOUNTER — TELEPHONE (OUTPATIENT)
Dept: INTERNAL MEDICINE CLINIC | Age: 74
End: 2024-10-21

## 2024-10-21 DIAGNOSIS — F32.A ANXIETY AND DEPRESSION: ICD-10-CM

## 2024-10-21 DIAGNOSIS — F41.9 ANXIETY AND DEPRESSION: ICD-10-CM

## 2024-10-21 RX ORDER — VENLAFAXINE HYDROCHLORIDE 150 MG/1
150 CAPSULE, EXTENDED RELEASE ORAL DAILY
Qty: 90 CAPSULE | Refills: 1 | Status: SHIPPED | OUTPATIENT
Start: 2024-10-21

## 2024-10-21 NOTE — TELEPHONE ENCOUNTER
Pt  calling requesting refill of Venlafaxine (9/30/24) and Omeprazole  (5/11/22)  90 day supply    Last written see above  Last OV 9/11/24  Next OV 12/18/24  Last recommended OV NA     Please send to NEW PHARMACY                         [facility]

## 2024-11-20 ENCOUNTER — TELEPHONE (OUTPATIENT)
Dept: INTERNAL MEDICINE CLINIC | Age: 74
End: 2024-11-20

## 2024-11-20 DIAGNOSIS — G89.4 CHRONIC PAIN DISORDER: ICD-10-CM

## 2024-11-20 DIAGNOSIS — M51.369 DDD (DEGENERATIVE DISC DISEASE), LUMBAR: ICD-10-CM

## 2024-11-20 NOTE — TELEPHONE ENCOUNTER
Pt  calling requesting refill of Oxycodone    Last written 10/17/24  Last OV 9/11/24  Next OV 12/18/24  Last recommended OV NA     [facility] [place]

## 2024-11-21 RX ORDER — OXYCODONE AND ACETAMINOPHEN 5; 325 MG/1; MG/1
.5-1 TABLET ORAL EVERY 8 HOURS PRN
Qty: 90 TABLET | Refills: 0 | Status: SHIPPED | OUTPATIENT
Start: 2024-11-21 | End: 2024-12-21

## 2024-11-27 ENCOUNTER — TELEPHONE (OUTPATIENT)
Dept: INTERNAL MEDICINE CLINIC | Age: 74
End: 2024-11-27

## 2024-11-27 RX ORDER — METHOCARBAMOL 750 MG/1
372-750 TABLET, FILM COATED ORAL 3 TIMES DAILY PRN
Qty: 90 TABLET | Refills: 2 | Status: SHIPPED | OUTPATIENT
Start: 2024-11-27

## 2024-11-27 NOTE — TELEPHONE ENCOUNTER
Pt  calling requesting refill of Methocarbamol     Last written 9/3/24  Last OV 9/11/24  Next OV 12/18/24  Last recommended OV NA     Please send to [facility] [place]

## 2024-12-11 RX ORDER — PREDNISONE 5 MG/1
TABLET ORAL
Qty: 90 TABLET | Refills: 1 | Status: SHIPPED | OUTPATIENT
Start: 2024-12-11

## 2024-12-17 ENCOUNTER — HOSPITAL ENCOUNTER (OUTPATIENT)
Dept: MRI IMAGING | Age: 74
Discharge: HOME | End: 2024-12-17
Payer: MEDICARE

## 2024-12-17 PROCEDURE — 70551 MRI BRAIN STEM W/O DYE: CPT

## 2024-12-18 ENCOUNTER — OFFICE VISIT (OUTPATIENT)
Dept: INTERNAL MEDICINE CLINIC | Age: 74
End: 2024-12-18
Payer: MEDICARE

## 2024-12-18 ENCOUNTER — OFFICE VISIT (OUTPATIENT)
Dept: INTERNAL MEDICINE CLINIC | Age: 74
End: 2024-12-18

## 2024-12-18 VITALS
HEART RATE: 77 BPM | WEIGHT: 174.8 LBS | HEIGHT: 66 IN | OXYGEN SATURATION: 97 % | BODY MASS INDEX: 28.09 KG/M2 | SYSTOLIC BLOOD PRESSURE: 160 MMHG | DIASTOLIC BLOOD PRESSURE: 90 MMHG

## 2024-12-18 DIAGNOSIS — Z00.00 MEDICARE ANNUAL WELLNESS VISIT, SUBSEQUENT: Primary | ICD-10-CM

## 2024-12-18 DIAGNOSIS — F41.9 ANXIETY AND DEPRESSION: ICD-10-CM

## 2024-12-18 DIAGNOSIS — M51.369 DDD (DEGENERATIVE DISC DISEASE), LUMBAR: ICD-10-CM

## 2024-12-18 DIAGNOSIS — E11.22 TYPE 2 DIABETES MELLITUS WITH STAGE 3B CHRONIC KIDNEY DISEASE, WITHOUT LONG-TERM CURRENT USE OF INSULIN (HCC): ICD-10-CM

## 2024-12-18 DIAGNOSIS — F11.20 OPIOID DEPENDENCE WITH CURRENT USE (HCC): ICD-10-CM

## 2024-12-18 DIAGNOSIS — I48.0 PAROXYSMAL ATRIAL FIBRILLATION (HCC): ICD-10-CM

## 2024-12-18 DIAGNOSIS — R90.82 WHITE MATTER DISEASE: ICD-10-CM

## 2024-12-18 DIAGNOSIS — N18.32 STAGE 3B CHRONIC KIDNEY DISEASE (HCC): ICD-10-CM

## 2024-12-18 DIAGNOSIS — R56.9 SEIZURES (HCC): ICD-10-CM

## 2024-12-18 DIAGNOSIS — L84 CALLUS OF FOOT: ICD-10-CM

## 2024-12-18 DIAGNOSIS — N18.32 TYPE 2 DIABETES MELLITUS WITH STAGE 3B CHRONIC KIDNEY DISEASE, WITHOUT LONG-TERM CURRENT USE OF INSULIN (HCC): ICD-10-CM

## 2024-12-18 DIAGNOSIS — G89.4 CHRONIC PAIN DISORDER: ICD-10-CM

## 2024-12-18 DIAGNOSIS — F32.A ANXIETY AND DEPRESSION: ICD-10-CM

## 2024-12-18 DIAGNOSIS — E87.6 HYPOKALEMIA: ICD-10-CM

## 2024-12-18 DIAGNOSIS — I10 ESSENTIAL HYPERTENSION: ICD-10-CM

## 2024-12-18 DIAGNOSIS — E78.5 HYPERLIPIDEMIA LDL GOAL <70: ICD-10-CM

## 2024-12-18 DIAGNOSIS — M51.362 DEGENERATION OF INTERVERTEBRAL DISC OF LUMBAR REGION WITH DISCOGENIC BACK PAIN AND LOWER EXTREMITY PAIN: ICD-10-CM

## 2024-12-18 DIAGNOSIS — R41.3 MEMORY LOSS OR IMPAIRMENT: Primary | ICD-10-CM

## 2024-12-18 DIAGNOSIS — M54.12 CERVICAL RADICULOPATHY: ICD-10-CM

## 2024-12-18 DIAGNOSIS — Z79.899 MEDICATION MANAGEMENT: ICD-10-CM

## 2024-12-18 PROCEDURE — 1159F MED LIST DOCD IN RCRD: CPT | Performed by: INTERNAL MEDICINE

## 2024-12-18 PROCEDURE — 1123F ACP DISCUSS/DSCN MKR DOCD: CPT | Performed by: INTERNAL MEDICINE

## 2024-12-18 PROCEDURE — G0439 PPPS, SUBSEQ VISIT: HCPCS | Performed by: INTERNAL MEDICINE

## 2024-12-18 PROCEDURE — G8482 FLU IMMUNIZE ORDER/ADMIN: HCPCS | Performed by: INTERNAL MEDICINE

## 2024-12-18 PROCEDURE — 3017F COLORECTAL CA SCREEN DOC REV: CPT | Performed by: INTERNAL MEDICINE

## 2024-12-18 RX ORDER — OXYCODONE AND ACETAMINOPHEN 5; 325 MG/1; MG/1
.5-1 TABLET ORAL EVERY 8 HOURS PRN
Qty: 90 TABLET | Refills: 0 | Status: SHIPPED | OUTPATIENT
Start: 2024-12-20 | End: 2025-01-19

## 2024-12-18 RX ORDER — EMPAGLIFLOZIN 25 MG/1
25 TABLET, FILM COATED ORAL DAILY
Qty: 90 TABLET | Refills: 3 | Status: SHIPPED | OUTPATIENT
Start: 2024-12-18

## 2024-12-18 RX ORDER — SPIRONOLACTONE 25 MG/1
12.5 TABLET ORAL DAILY
Qty: 45 TABLET | Refills: 3 | Status: SHIPPED | OUTPATIENT
Start: 2024-12-18

## 2024-12-18 ASSESSMENT — PATIENT HEALTH QUESTIONNAIRE - PHQ9
10. IF YOU CHECKED OFF ANY PROBLEMS, HOW DIFFICULT HAVE THESE PROBLEMS MADE IT FOR YOU TO DO YOUR WORK, TAKE CARE OF THINGS AT HOME, OR GET ALONG WITH OTHER PEOPLE: SOMEWHAT DIFFICULT
3. TROUBLE FALLING OR STAYING ASLEEP: SEVERAL DAYS
1. LITTLE INTEREST OR PLEASURE IN DOING THINGS: MORE THAN HALF THE DAYS
2. FEELING DOWN, DEPRESSED OR HOPELESS: SEVERAL DAYS
7. TROUBLE CONCENTRATING ON THINGS, SUCH AS READING THE NEWSPAPER OR WATCHING TELEVISION: NEARLY EVERY DAY
SUM OF ALL RESPONSES TO PHQ QUESTIONS 1-9: 11
8. MOVING OR SPEAKING SO SLOWLY THAT OTHER PEOPLE COULD HAVE NOTICED. OR THE OPPOSITE, BEING SO FIGETY OR RESTLESS THAT YOU HAVE BEEN MOVING AROUND A LOT MORE THAN USUAL: SEVERAL DAYS
9. THOUGHTS THAT YOU WOULD BE BETTER OFF DEAD, OR OF HURTING YOURSELF: NOT AT ALL
SUM OF ALL RESPONSES TO PHQ9 QUESTIONS 1 & 2: 3
4. FEELING TIRED OR HAVING LITTLE ENERGY: NEARLY EVERY DAY
SUM OF ALL RESPONSES TO PHQ QUESTIONS 1-9: 11
5. POOR APPETITE OR OVEREATING: NOT AT ALL
SUM OF ALL RESPONSES TO PHQ QUESTIONS 1-9: 11
6. FEELING BAD ABOUT YOURSELF - OR THAT YOU ARE A FAILURE OR HAVE LET YOURSELF OR YOUR FAMILY DOWN: NOT AT ALL
SUM OF ALL RESPONSES TO PHQ QUESTIONS 1-9: 11

## 2024-12-18 NOTE — PROGRESS NOTES
your eyesight?: (!) Yes  Have you had an eye exam within the past year?: (!) No  Interventions:   Patient encouraged to make appointment with their eye specialist     ADL's:   Patient reports needing help with:  Select all that apply: (!) Transportation  Interventions:  Patient comments:  or daughter assist with transportation. Has not additional transportation needs.                  Objective   There were no vitals filed for this visit.   There is no height or weight on file to calculate BMI.                    Allergies   Allergen Reactions    Codeine Nausea And Vomiting    Compazine [Prochlorperazine Maleate] Shortness Of Breath     dyspnea    Morphine Nausea And Vomiting     N&V     Prior to Visit Medications    Medication Sig Taking? Authorizing Provider   oxyCODONE-acetaminophen (PERCOCET) 5-325 MG per tablet Take 0.5-1 tablets by mouth every 8 hours as needed for Pain for up to 30 days.  La Nena Overton DO   spironolactone (ALDACTONE) 25 MG tablet Take 0.5 tablets by mouth daily  La Nena Overton DO   JARDIANCE 25 MG tablet Take 1 tablet by mouth daily  La Nena Overton DO   predniSONE (DELTASONE) 5 MG tablet TAKE 1 TABLET DAILY  La Nena Overton DO   methocarbamol (ROBAXIN) 750 MG tablet Take 0.5-1 tablets by mouth 3 times daily as needed (pain and spasm)  La Nena Overton DO   omeprazole (PRILOSEC) 20 MG delayed release capsule Take 1 capsule by mouth 2 times daily as needed (nauease and GI upset) =  La Nena Overton DO   venlafaxine (EFFEXOR XR) 150 MG extended release capsule Take 1 capsule by mouth daily  La Nena Overton DO   pantoprazole (PROTONIX) 40 MG tablet Take 1 tablet by mouth every morning (before breakfast)  La Nena Overton DO   rosuvastatin (CRESTOR) 20 MG tablet TAKE 1 TABLET DAILY  La Nena Overton DO   amLODIPine (NORVASC) 10 MG tablet Take 1 tablet by mouth daily  La Nena Overton DO   lamoTRIgine (LAMICTAL) 100 MG tablet Take 1 tablet by mouth 2  29.3

## 2024-12-18 NOTE — PATIENT INSTRUCTIONS
help.  Use stepladders instead of chairs to reach high objects. Don't climb if you're at risk for falls. Ask for help, if needed.  Wear the correct eyeglasses, if you need them.        Make your home safer.   Remove rugs, cords, clutter, and furniture from walkways.  Keep your house well lit. Use night-lights in hallways and bathrooms.  Install and use sturdy handrails on stairways.  Wear nonskid footwear, even inside. Don't walk barefoot or in socks without shoes.        Be safe outside.   Use handrails, curb cuts, and ramps whenever possible.  Keep your hands free by using a shoulder bag or backpack.  Try to walk in well-lit areas. Watch out for uneven ground, changes in pavement, and debris.  Be careful in the winter. Walk on the grass or gravel when sidewalks are slippery. Use de-icer on steps and walkways. Add non-slip devices to shoes.    Put grab bars and nonskid mats in your shower or tub and near the toilet. Try to use a shower chair or bath bench when bathing.   Get into a tub or shower by putting in your weaker leg first. Get out with your strong side first. Have a phone or medical alert device in the bathroom with you.   Where can you learn more?  Go to https://www.[employer].net/patientEd and enter G117 to learn more about \"Preventing Falls: Care Instructions.\"  Current as of: July 17, 2023  Content Version: 14.2  © 2024 [employer].   Care instructions adapted under license by [employer]. If you have questions about a medical condition or this instruction, always ask your healthcare professional. [employer] disclaims any warranty or liability for your use of this information.           Learning About Mindfulness for Stress  What are mindfulness and stress?     Stress is your body's response to a hard situation. Your body can have a physical, emotional, or mental response. A lot of things can cause stress. You may feel stress when you go on a job interview, take a test, or run

## 2024-12-18 NOTE — PROGRESS NOTES
rosuvastatin (CRESTOR) 20 MG tablet TAKE 1 TABLET DAILY 90 tablet 3    amLODIPine (NORVASC) 10 MG tablet Take 1 tablet by mouth daily 90 tablet 3    lamoTRIgine (LAMICTAL) 100 MG tablet Take 1 tablet by mouth 2 times daily 180 tablet 3    metoprolol succinate (TOPROL XL) 25 MG extended release tablet Take 1 tablet by mouth 2 times daily 180 tablet 3    Manganese 50 MG TABS Take 50 mg by mouth nightly      blood glucose monitor kit and supplies Check sugars qd and prn 1 kit 0    blood glucose monitor strips Test 1 times a day & as needed for symptoms of irregular blood glucose. 100 strip 3    Lancets MISC 1 each by Does not apply route daily 100 each 5    niacin 500 MG extended release capsule Take 1 capsule by mouth nightly      CPAP Machine MISC by Does not apply route      oxyCODONE-acetaminophen (PERCOCET) 5-325 MG per tablet Take 0.5-1 tablets by mouth every 8 hours as needed for Pain for up to 30 days. 90 tablet 0     No current facility-administered medications for this visit.       Return in about 3 months (around 3/18/2025).

## 2024-12-24 NOTE — ASSESSMENT & PLAN NOTE
Secondary to hypertension and NSAID use.  No longer using NSAIDs.  Does follow with Dr. Brown for nephrology.

## 2024-12-24 NOTE — ASSESSMENT & PLAN NOTE
Progressive.  Repeat SLUMS today at 22/30 (12th grade education).Referral placed to  [facility] for a second opinion.  Patient did undergo neuropsych testing in 2022 that was reportedly normal.  We will also try to get a copy of that report.  Patient is more symptomatic with regards to her memory difficulties.

## 2024-12-24 NOTE — ASSESSMENT & PLAN NOTE
Uncontrolled today.  Has been out of spironolactone since September for unclear reasons.  Continue metoprolol ER 25 mg twice daily and amlodipine 10 mg daily.  Resume spironolactone 12.5 mg daily.

## 2024-12-24 NOTE — ASSESSMENT & PLAN NOTE
Spinal stimulator in place that has been ineffective.  Continue Percocet 5/325 mg 2 to 3 times daily as needed.  Unable to take NSAIDs due to chronic kidney disease.  Gabapentin was discontinued in March 2024 due to ineffectiveness.  Also remains on prednisone 5 mg daily.  Check urine tox today.

## 2024-12-24 NOTE — ASSESSMENT & PLAN NOTE
Continue good blood pressure, cholesterol, and diabetes control.  Referral placed to  [facility] for a second opinion.  Patient did undergo neuropsych testing in 2022 that was reportedly normal.  We will also try to get a copy of that report.  Patient is more symptomatic with regards to her memory difficulties.

## 2024-12-24 NOTE — ASSESSMENT & PLAN NOTE
Continue Percocet 5/325 mg to 3 times daily.  Unable to take NSAIDs due to chronic kidney disease.  Gabapentin was discontinued in March 2024 due to ineffectiveness.

## 2024-12-24 NOTE — ASSESSMENT & PLAN NOTE
Patient tried to avoid use of narcotics for many years but did not get relief with nonnarcotic modalities and is not able to take NSAIDs due to chronic kidney disease.  Patient does not feel that she is addicted to Percocet and that is a big concern for her as her son does struggle with addiction.  Patient's  helps keep track of pain medicines for her and her medications are locked up safely in and out of reach from their son.  Remains on Percocet 5/325 mg 3 times daily as needed for pain.

## 2024-12-30 ENCOUNTER — TELEPHONE (OUTPATIENT)
Dept: INTERNAL MEDICINE CLINIC | Age: 74
End: 2024-12-30

## 2024-12-30 NOTE — TELEPHONE ENCOUNTER
Patient notified Dr Overton is out of the office until 1/6/25 and will review her message at that time.

## 2024-12-30 NOTE — TELEPHONE ENCOUNTER
Gave patient results of MRI. Patient would like to know what her next steps are or if she needs to schedule another appointment with Dr. Overton. Please advise.

## 2024-12-31 NOTE — TELEPHONE ENCOUNTER
The next step is for her to see neurology at  (referred at [facility] on 12/18). Keep BP controlled, sugars controlled, and cholesterol controlled will help with these changes.

## 2025-01-07 ENCOUNTER — TELEPHONE (OUTPATIENT)
Dept: INTERNAL MEDICINE CLINIC | Age: 75
End: 2025-01-07

## 2025-01-07 NOTE — TELEPHONE ENCOUNTER
Referral for neuro, face sheet and insurance info faxed to Sanjuanita at  Neuro:    Phone [phone]  Fax [phone]    No further action needed at this time.

## 2025-01-20 DIAGNOSIS — G89.4 CHRONIC PAIN DISORDER: ICD-10-CM

## 2025-01-20 DIAGNOSIS — M51.369 DDD (DEGENERATIVE DISC DISEASE), LUMBAR: ICD-10-CM

## 2025-01-20 RX ORDER — OXYCODONE AND ACETAMINOPHEN 5; 325 MG/1; MG/1
.5-1 TABLET ORAL EVERY 8 HOURS PRN
Qty: 90 TABLET | Refills: 0 | Status: SHIPPED | OUTPATIENT
Start: 2025-01-20 | End: 2025-02-19

## 2025-01-20 NOTE — TELEPHONE ENCOUNTER
Pt calling requesting refill of oxyCODONE-acetaminophen (PERCOCET) 5-325 MG per tablet     Last written 12/20/24  Last OV 12/18/24  Next OV 3/19/25    [facility] [address]

## 2025-02-03 RX ORDER — AMLODIPINE BESYLATE 10 MG/1
10 TABLET ORAL DAILY
Qty: 90 TABLET | Refills: 1 | Status: SHIPPED | OUTPATIENT
Start: 2025-02-03

## 2025-02-11 ENCOUNTER — TELEPHONE (OUTPATIENT)
Dept: INTERNAL MEDICINE CLINIC | Age: 75
End: 2025-02-11

## 2025-02-11 DIAGNOSIS — N18.32 STAGE 3B CHRONIC KIDNEY DISEASE (HCC): ICD-10-CM

## 2025-02-11 DIAGNOSIS — J10.1 INFLUENZA A: Primary | ICD-10-CM

## 2025-02-11 RX ORDER — OSELTAMIVIR PHOSPHATE 30 MG/1
30 CAPSULE ORAL 2 TIMES DAILY
Qty: 10 CAPSULE | Refills: 0 | Status: SHIPPED | OUTPATIENT
Start: 2025-02-11 | End: 2025-02-16

## 2025-02-11 NOTE — TELEPHONE ENCOUNTER
Pt calling, was diagnosed with Flu A today 2/11 at Urgent Care in [place]. States provider she saw would not prescribe medications, wanted pt to take supplements- pt is not comfortable with that. Pt is experiencing sore throat, headache & body aches. Asks if Dr. Overton would be able to [facility] [address] Please advise and call pt.

## 2025-02-11 NOTE — TELEPHONE ENCOUNTER
Called pharmacy, they stated that they didn't have it in stock and that the outside pharmacy doesn't have the 30mg. Notified patient. Will check with patient to get names of other Pharmacies in her area.

## 2025-02-11 NOTE — TELEPHONE ENCOUNTER
Tamiflu 30 mg BID x 5 days sent to [facility] in [place]. Please call pharmacy and confirm that they have this in stock or will be able to get it to her tomorrow.

## 2025-02-12 ENCOUNTER — TELEPHONE (OUTPATIENT)
Dept: INTERNAL MEDICINE CLINIC | Age: 75
End: 2025-02-12

## 2025-02-12 NOTE — TELEPHONE ENCOUNTER
Called and spoke to patient's spouse to get a list of pharmacies in there area. I called [facility] on [address], [facility] [address]. They stated that they have one box left. Notified patient's spouse and gave info. Pharmacy [phone]

## 2025-02-21 DIAGNOSIS — M51.369 DDD (DEGENERATIVE DISC DISEASE), LUMBAR: ICD-10-CM

## 2025-02-21 DIAGNOSIS — G89.4 CHRONIC PAIN DISORDER: ICD-10-CM

## 2025-02-21 RX ORDER — OXYCODONE AND ACETAMINOPHEN 5; 325 MG/1; MG/1
.5-1 TABLET ORAL EVERY 8 HOURS PRN
Qty: 90 TABLET | Refills: 0 | Status: SHIPPED | OUTPATIENT
Start: 2025-02-21 | End: 2025-03-28 | Stop reason: SDUPTHER

## 2025-02-21 NOTE — TELEPHONE ENCOUNTER
Received refill request for oxyCODONE-acetaminophen (PERCOCET) 5-325 MG per tablet  from [facility] pharmacy.     Last OV: 12/18/2024    Next OV: 03/19/2025    Last Labs: 09/11/2024    Last Filled: 01/20//2025

## 2025-02-25 ENCOUNTER — OFFICE VISIT (OUTPATIENT)
Dept: INTERNAL MEDICINE CLINIC | Age: 75
End: 2025-02-25

## 2025-02-25 VITALS
HEART RATE: 66 BPM | OXYGEN SATURATION: 95 % | BODY MASS INDEX: 27 KG/M2 | WEIGHT: 168 LBS | HEIGHT: 66 IN | TEMPERATURE: 98.2 F | DIASTOLIC BLOOD PRESSURE: 82 MMHG | SYSTOLIC BLOOD PRESSURE: 166 MMHG

## 2025-02-25 DIAGNOSIS — K12.0 APHTHOUS ULCER OF TONGUE: ICD-10-CM

## 2025-02-25 DIAGNOSIS — J03.90 TONSILLITIS: ICD-10-CM

## 2025-02-25 DIAGNOSIS — J02.9 SORE THROAT: Primary | ICD-10-CM

## 2025-02-25 DIAGNOSIS — I10 ESSENTIAL HYPERTENSION: ICD-10-CM

## 2025-02-25 PROBLEM — N18.31 STAGE 3A CHRONIC KIDNEY DISEASE (HCC): Chronic | Status: RESOLVED | Noted: 2024-06-22 | Resolved: 2024-09-08

## 2025-02-25 LAB — S PYO AG THROAT QL: NORMAL

## 2025-02-25 RX ORDER — AMOXICILLIN 500 MG/1
500 CAPSULE ORAL 3 TIMES DAILY
Qty: 30 CAPSULE | Refills: 0 | Status: SHIPPED | OUTPATIENT
Start: 2025-02-25 | End: 2025-03-07

## 2025-02-25 RX ORDER — LIDOCAINE HYDROCHLORIDE 20 MG/ML
SOLUTION OROPHARYNGEAL
Qty: 100 ML | Refills: 1 | Status: SHIPPED | OUTPATIENT
Start: 2025-02-25

## 2025-02-25 SDOH — ECONOMIC STABILITY: FOOD INSECURITY: WITHIN THE PAST 12 MONTHS, YOU WORRIED THAT YOUR FOOD WOULD RUN OUT BEFORE YOU GOT MONEY TO BUY MORE.: NEVER TRUE

## 2025-02-25 SDOH — ECONOMIC STABILITY: FOOD INSECURITY: WITHIN THE PAST 12 MONTHS, THE FOOD YOU BOUGHT JUST DIDN'T LAST AND YOU DIDN'T HAVE MONEY TO GET MORE.: NEVER TRUE

## 2025-02-25 ASSESSMENT — PATIENT HEALTH QUESTIONNAIRE - PHQ9
2. FEELING DOWN, DEPRESSED OR HOPELESS: MORE THAN HALF THE DAYS
5. POOR APPETITE OR OVEREATING: MORE THAN HALF THE DAYS
10. IF YOU CHECKED OFF ANY PROBLEMS, HOW DIFFICULT HAVE THESE PROBLEMS MADE IT FOR YOU TO DO YOUR WORK, TAKE CARE OF THINGS AT HOME, OR GET ALONG WITH OTHER PEOPLE: SOMEWHAT DIFFICULT
3. TROUBLE FALLING OR STAYING ASLEEP: MORE THAN HALF THE DAYS
SUM OF ALL RESPONSES TO PHQ QUESTIONS 1-9: 14
SUM OF ALL RESPONSES TO PHQ QUESTIONS 1-9: 14
SUM OF ALL RESPONSES TO PHQ9 QUESTIONS 1 & 2: 4
9. THOUGHTS THAT YOU WOULD BE BETTER OFF DEAD, OR OF HURTING YOURSELF: NOT AT ALL
6. FEELING BAD ABOUT YOURSELF - OR THAT YOU ARE A FAILURE OR HAVE LET YOURSELF OR YOUR FAMILY DOWN: MORE THAN HALF THE DAYS
1. LITTLE INTEREST OR PLEASURE IN DOING THINGS: MORE THAN HALF THE DAYS
4. FEELING TIRED OR HAVING LITTLE ENERGY: MORE THAN HALF THE DAYS
7. TROUBLE CONCENTRATING ON THINGS, SUCH AS READING THE NEWSPAPER OR WATCHING TELEVISION: MORE THAN HALF THE DAYS
SUM OF ALL RESPONSES TO PHQ QUESTIONS 1-9: 14
8. MOVING OR SPEAKING SO SLOWLY THAT OTHER PEOPLE COULD HAVE NOTICED. OR THE OPPOSITE, BEING SO FIGETY OR RESTLESS THAT YOU HAVE BEEN MOVING AROUND A LOT MORE THAN USUAL: NOT AT ALL
SUM OF ALL RESPONSES TO PHQ QUESTIONS 1-9: 14

## 2025-02-25 ASSESSMENT — ENCOUNTER SYMPTOMS
SORE THROAT: 1
RHINORRHEA: 0
SHORTNESS OF BREATH: 0
WHEEZING: 0
COUGH: 0

## 2025-02-25 NOTE — ASSESSMENT & PLAN NOTE
-  POCT rapid strep is negative.  -  Throat culture sent  -  Will cover with amoxicillin 500 mg 3 times a day  -  Salt water gargles and throat lozenges

## 2025-02-25 NOTE — PROGRESS NOTES
Office Visit   2/25/2025    Assessment and Plan:  1. Sore throat  Assessment & Plan:  -  POCT rapid strep is negative.  -  Throat culture sent  -  Will cover with amoxicillin 500 mg 3 times a day  -  Salt water gargles and throat lozenges  Orders:  -     POCT rapid strep A  -     Culture, Throat    2. Tonsillitis  -     amoxicillin (AMOXIL) 500 MG capsule; Take 1 capsule by mouth 3 times daily for 10 days, Disp-30 capsule, R-0Normal    3. Aphthous ulcer of tongue  -     lidocaine viscous hcl (XYLOCAINE) 2 % SOLN solution; Use 5 to 10 cc swish and swallow every 3 hours for pain., Disp-100 mL, R-1Normal    4. Essential hypertension  Assessment & Plan:  -  Chronic, uncontrolled today.  Has not been taking her blood pressure medication as prescribed.  -   Advised patient to take her metoprolol ER 25 mg twice daily, amlodipine 10 mg daily and spironolactone 12.5 mg daily  -   Recommend she check her blood pressure at home  -   Follow-up with her primary care provider in a few weeks.    Return in about 2 weeks (around 3/11/2025) for htn with dr. rocha.     Subjective:  Chief Complaint   Patient presents with    Pharyngitis     X2 weeks     Mouth Lesions     X2 weeks        HPI:   Maile Obrien is a 74 y.o. female who presents to the clinic today for acute visit.    Patient is using her rolling walker and by herself.    Patient diagnosed with influenza A on 2/11/2025 at Urgent Care in [place].  Completed [employer]    Patient reports sore throat left side and swollen gland on left side.  Hurts to swallow but able to swallow liquids and food without difficulties.  Reports a painful lesion left side of tongue.  Hurts to eat.  States this has been going on for 2 weeks.  Denies fever or chills.    Treatment: throat lozenges, benadryl, salt water rinses.    HNT-blood pressure is high today 166/82.  Patient states she is taking amlodipine 10 mg daily and metoprolol succinate once a day instead of twice.  She does not think she

## 2025-02-25 NOTE — ASSESSMENT & PLAN NOTE
-  Chronic, uncontrolled today.  Has not been taking her blood pressure medication as prescribed.  -   Advised patient to take her metoprolol ER 25 mg twice daily, amlodipine 10 mg daily and spironolactone 12.5 mg daily  -   Recommend she check her blood pressure at home  -   Follow-up with her primary care provider in a few weeks.

## 2025-02-28 LAB — BACTERIA THROAT AEROBE CULT: NORMAL

## 2025-03-04 RX ORDER — METHOCARBAMOL 750 MG/1
372-750 TABLET, FILM COATED ORAL 3 TIMES DAILY PRN
Qty: 90 TABLET | Refills: 1 | Status: SHIPPED | OUTPATIENT
Start: 2025-03-04

## 2025-03-04 NOTE — TELEPHONE ENCOUNTER
Last OV: 2/25/2025  Next OV: 3/19/2025    Next appointment due: Return in about 2 weeks (around 3/11/2025      Last fill: 11/27/2024  Refills: 2

## 2025-03-07 ENCOUNTER — TELEPHONE (OUTPATIENT)
Dept: INTERNAL MEDICINE CLINIC | Age: 75
End: 2025-03-07

## 2025-03-07 NOTE — TELEPHONE ENCOUNTER
Pt calling, saw Jossy 2/25 and was prescribed amoxicillin and lidocaine viscous hcl (XYLOCAINE) 2 % SOLN solution. Pt states her symptoms have not improved, asks if something else could be called in. [facility] [address] Please advise and call pt.

## 2025-03-07 NOTE — TELEPHONE ENCOUNTER
Patient states that she still has a sore throat on 1 side, cold sores in her mouth, non-productive cough, congestion and fatigue. No longer has fever.

## 2025-03-07 NOTE — TELEPHONE ENCOUNTER
What are her sx? I did review Jossy's note and saw that strep was negative and was given amoxicillin. If amoxicillin did not work, her illness may be viral in etiology.

## 2025-03-07 NOTE — TELEPHONE ENCOUNTER
It sounds viral but not sure w/o seeing her. I recommend urgent care over the weekend or seeing me Monday (or both).

## 2025-03-10 NOTE — TELEPHONE ENCOUNTER
Spoke with patient, she still has a sore throat, but is doing better, no need to come in for an appointment

## 2025-03-19 ENCOUNTER — OFFICE VISIT (OUTPATIENT)
Dept: INTERNAL MEDICINE CLINIC | Age: 75
End: 2025-03-19

## 2025-03-19 VITALS
BODY MASS INDEX: 27.23 KG/M2 | HEIGHT: 66 IN | SYSTOLIC BLOOD PRESSURE: 150 MMHG | DIASTOLIC BLOOD PRESSURE: 80 MMHG | OXYGEN SATURATION: 97 % | WEIGHT: 169.4 LBS | HEART RATE: 65 BPM

## 2025-03-19 DIAGNOSIS — N18.32 STAGE 3B CHRONIC KIDNEY DISEASE (HCC): ICD-10-CM

## 2025-03-19 DIAGNOSIS — E78.5 HYPERLIPIDEMIA LDL GOAL <70: ICD-10-CM

## 2025-03-19 DIAGNOSIS — R90.82 WHITE MATTER DISEASE: ICD-10-CM

## 2025-03-19 DIAGNOSIS — M54.12 CERVICAL RADICULOPATHY: Primary | ICD-10-CM

## 2025-03-19 DIAGNOSIS — E11.22 TYPE 2 DIABETES MELLITUS WITH STAGE 3B CHRONIC KIDNEY DISEASE, WITHOUT LONG-TERM CURRENT USE OF INSULIN (HCC): ICD-10-CM

## 2025-03-19 DIAGNOSIS — I10 ESSENTIAL HYPERTENSION: ICD-10-CM

## 2025-03-19 DIAGNOSIS — G89.4 CHRONIC PAIN DISORDER: ICD-10-CM

## 2025-03-19 DIAGNOSIS — R56.9 SEIZURES (HCC): ICD-10-CM

## 2025-03-19 DIAGNOSIS — R41.3 MEMORY LOSS OR IMPAIRMENT: ICD-10-CM

## 2025-03-19 DIAGNOSIS — N18.32 TYPE 2 DIABETES MELLITUS WITH STAGE 3B CHRONIC KIDNEY DISEASE, WITHOUT LONG-TERM CURRENT USE OF INSULIN (HCC): ICD-10-CM

## 2025-03-19 DIAGNOSIS — I48.0 PAROXYSMAL ATRIAL FIBRILLATION (HCC): ICD-10-CM

## 2025-03-19 DIAGNOSIS — F11.20 OPIOID DEPENDENCE WITH CURRENT USE (HCC): ICD-10-CM

## 2025-03-19 DIAGNOSIS — E55.9 VITAMIN D DEFICIENCY: ICD-10-CM

## 2025-03-19 PROBLEM — K12.0 APHTHOUS ULCER OF TONGUE: Status: RESOLVED | Noted: 2025-02-25 | Resolved: 2025-03-19

## 2025-03-19 PROBLEM — J02.9 SORE THROAT: Status: RESOLVED | Noted: 2025-02-25 | Resolved: 2025-03-19

## 2025-03-19 LAB
ALBUMIN SERPL-MCNC: 4.2 G/DL (ref 3.4–5)
ALBUMIN/GLOB SERPL: 1.4 {RATIO} (ref 1.1–2.2)
ALP SERPL-CCNC: 82 U/L (ref 40–129)
ALT SERPL-CCNC: 18 U/L (ref 10–40)
ANION GAP SERPL CALCULATED.3IONS-SCNC: 13 MMOL/L (ref 3–16)
AST SERPL-CCNC: 22 U/L (ref 15–37)
BASOPHILS # BLD: 0 K/UL (ref 0–0.2)
BASOPHILS NFR BLD: 0.4 %
BILIRUB SERPL-MCNC: 0.4 MG/DL (ref 0–1)
BUN SERPL-MCNC: 17 MG/DL (ref 7–20)
CALCIUM SERPL-MCNC: 10 MG/DL (ref 8.3–10.6)
CHLORIDE SERPL-SCNC: 104 MMOL/L (ref 99–110)
CHOLEST SERPL-MCNC: 185 MG/DL (ref 0–199)
CO2 SERPL-SCNC: 24 MMOL/L (ref 21–32)
CREAT SERPL-MCNC: 1.1 MG/DL (ref 0.6–1.2)
DEPRECATED RDW RBC AUTO: 14.5 % (ref 12.4–15.4)
EOSINOPHIL # BLD: 0.1 K/UL (ref 0–0.6)
EOSINOPHIL NFR BLD: 0.9 %
GFR SERPLBLD CREATININE-BSD FMLA CKD-EPI: 52 ML/MIN/{1.73_M2}
GLUCOSE SERPL-MCNC: 134 MG/DL (ref 70–99)
HCT VFR BLD AUTO: 45 % (ref 36–48)
HDLC SERPL-MCNC: 64 MG/DL (ref 40–60)
HGB BLD-MCNC: 14.6 G/DL (ref 12–16)
LDLC SERPL CALC-MCNC: 87 MG/DL
LYMPHOCYTES # BLD: 1.6 K/UL (ref 1–5.1)
LYMPHOCYTES NFR BLD: 14.6 %
MCH RBC QN AUTO: 27.6 PG (ref 26–34)
MCHC RBC AUTO-ENTMCNC: 32.5 G/DL (ref 31–36)
MCV RBC AUTO: 84.9 FL (ref 80–100)
MONOCYTES # BLD: 0.7 K/UL (ref 0–1.3)
MONOCYTES NFR BLD: 6.1 %
NEUTROPHILS # BLD: 8.4 K/UL (ref 1.7–7.7)
NEUTROPHILS NFR BLD: 78 %
PLATELET # BLD AUTO: 305 K/UL (ref 135–450)
PMV BLD AUTO: 9.1 FL (ref 5–10.5)
POTASSIUM SERPL-SCNC: 3.8 MMOL/L (ref 3.5–5.1)
PROT SERPL-MCNC: 7.1 G/DL (ref 6.4–8.2)
RBC # BLD AUTO: 5.3 M/UL (ref 4–5.2)
SODIUM SERPL-SCNC: 141 MMOL/L (ref 136–145)
TRIGL SERPL-MCNC: 169 MG/DL (ref 0–150)
TSH SERPL DL<=0.005 MIU/L-ACNC: 0.77 UIU/ML (ref 0.27–4.2)
VLDLC SERPL CALC-MCNC: 34 MG/DL
WBC # BLD AUTO: 10.8 K/UL (ref 4–11)

## 2025-03-19 RX ORDER — OLMESARTAN MEDOXOMIL 20 MG/1
20 TABLET ORAL DAILY
Qty: 90 TABLET | Refills: 1 | Status: SHIPPED | OUTPATIENT
Start: 2025-03-19

## 2025-03-19 NOTE — PROGRESS NOTES
(TOPROL XL) 25 MG extended release tablet Take 1 tablet by mouth 2 times daily 180 tablet 3    Manganese 50 MG TABS Take 50 mg by mouth nightly      blood glucose monitor kit and supplies Check sugars qd and prn 1 kit 0    blood glucose monitor strips Test 1 times a day & as needed for symptoms of irregular blood glucose. 100 strip 3    Lancets MISC 1 each by Does not apply route daily 100 each 5    niacin 500 MG extended release capsule Take 1 capsule by mouth nightly      CPAP Machine MISC by Does not apply route      methocarbamol (ROBAXIN) 750 MG tablet TAKE 0.5-1 TABLETS BY MOUTH 3 TIMES DAILY AS NEEDED (PAIN AND SPASM) (Patient not taking: Reported on 3/19/2025) 90 tablet 1    pantoprazole (PROTONIX) 40 MG tablet Take 1 tablet by mouth every morning (before breakfast) (Patient not taking: Reported on 3/19/2025) 30 tablet 5     No current facility-administered medications for this visit.       Return in about 6 weeks (around 4/30/2025) for follow up BP.

## 2025-03-20 LAB
EST. AVERAGE GLUCOSE BLD GHB EST-MCNC: 128.4 MG/DL
HBA1C MFR BLD: 6.1 %

## 2025-03-22 ENCOUNTER — RESULTS FOLLOW-UP (OUTPATIENT)
Dept: INTERNAL MEDICINE CLINIC | Age: 75
End: 2025-03-22

## 2025-03-22 PROBLEM — D64.9 MILD ANEMIA: Status: RESOLVED | Noted: 2022-08-28 | Resolved: 2025-03-22

## 2025-03-22 PROBLEM — J03.90 TONSILLITIS: Status: RESOLVED | Noted: 2025-02-25 | Resolved: 2025-03-22

## 2025-03-22 PROBLEM — G44.209 TENSION HEADACHE: Status: RESOLVED | Noted: 2019-08-24 | Resolved: 2025-03-22

## 2025-03-22 NOTE — ASSESSMENT & PLAN NOTE
Patient tried to avoid use of narcotics for many years but did not get relief with nonnarcotic modalities and is not able to take NSAIDs due to chronic kidney disease.  Patient does not feel that she is addicted to Percocet and that is a big concern for her as her son does struggle with addiction.  Patient's  helps keep track of pain medicines for her and her medications are locked up safely in and out of reach from their son.  Remains on Percocet 5/325 mg 3 times daily as needed for pain.    Controlled Substance Monitoring:    Acute and Chronic Pain Monitoring:   RX Monitoring Periodic Controlled Substance Monitoring   3/22/2025   7:00 PM Possible medication side effects, risk of tolerance/dependence & alternative treatments discussed.;Assessed functional status (ability to engage in work or other purposeful activities, the pain intensity and its interference with activities of daily living, quality of family life and social activities, and the physical activity);No signs of potential drug abuse or diversion identified.;Obtaining appropriate analgesic effect of treatment.

## 2025-03-22 NOTE — ASSESSMENT & PLAN NOTE
Increase Percocet 5/325 mg to 3 times daily to see if she has improved pain control.  Unable to take NSAIDs due to chronic kidney disease.  Gabapentin was discontinued in March 2024 due to ineffectiveness.

## 2025-03-22 NOTE — ASSESSMENT & PLAN NOTE
Secondary to hypertension and NSAID use.  No longer using NSAIDs.  Does follow with Dr. Brown for nephrology.  Check CMP.

## 2025-03-22 NOTE — ASSESSMENT & PLAN NOTE
Has noticed memory problems dating back to [id number] with her hospitalization for COVID and possible seizure.  Patient did undergo neuropsych testing in 2022 that was reportedly normal.  Recently established with neurology at .  Tau protein was elevated.  Is scheduled for functional PET scan.  Has follow-up in April/May timeframe.

## 2025-03-22 NOTE — ASSESSMENT & PLAN NOTE
Uncontrolled today.  Spironolactone was discontinued due to MEHDI.  Continue metoprolol ER 25 mg twice daily and amlodipine 10 mg daily.  Add olmesartan 20 mg daily.  Check BMP in 2 weeks.

## 2025-03-22 NOTE — ASSESSMENT & PLAN NOTE
Continue good blood pressure, cholesterol, and diabetes control.  Has noticed memory problems dating back to [id number] with her hospitalization for COVID and possible seizure.  Patient did undergo neuropsych testing in 2022 that was reportedly normal.  Recently established with neurology at .  Tau protein was elevated.  Is scheduled for functional PET scan.  Has follow-up in April/May timeframe.

## 2025-03-28 ENCOUNTER — TELEPHONE (OUTPATIENT)
Dept: INTERNAL MEDICINE CLINIC | Age: 75
End: 2025-03-28

## 2025-03-28 DIAGNOSIS — G89.4 CHRONIC PAIN DISORDER: ICD-10-CM

## 2025-03-28 DIAGNOSIS — M51.369 DDD (DEGENERATIVE DISC DISEASE), LUMBAR: ICD-10-CM

## 2025-03-28 RX ORDER — OXYCODONE AND ACETAMINOPHEN 5; 325 MG/1; MG/1
.5-1 TABLET ORAL EVERY 8 HOURS PRN
Qty: 90 TABLET | Refills: 0 | Status: SHIPPED | OUTPATIENT
Start: 2025-03-28 | End: 2025-04-27

## 2025-03-28 NOTE — TELEPHONE ENCOUNTER
Pt  calling requesting refill of Oxycodone    Last written 2/21/25  Last OV 3/19/25  Next OV 4/29/25  Last recommended OV Na     Please send to [facility] [place]

## 2025-04-02 DIAGNOSIS — F32.A ANXIETY AND DEPRESSION: ICD-10-CM

## 2025-04-02 DIAGNOSIS — F41.9 ANXIETY AND DEPRESSION: ICD-10-CM

## 2025-04-02 RX ORDER — VENLAFAXINE HYDROCHLORIDE 150 MG/1
150 CAPSULE, EXTENDED RELEASE ORAL DAILY
Qty: 90 CAPSULE | Refills: 1 | Status: SHIPPED | OUTPATIENT
Start: 2025-04-02

## 2025-04-04 DIAGNOSIS — R56.9 NEW ONSET SEIZURE (HCC): ICD-10-CM

## 2025-04-04 DIAGNOSIS — I10 ESSENTIAL HYPERTENSION: ICD-10-CM

## 2025-04-04 RX ORDER — LAMOTRIGINE 100 MG/1
100 TABLET ORAL 2 TIMES DAILY
Qty: 180 TABLET | Refills: 3 | Status: SHIPPED | OUTPATIENT
Start: 2025-04-04

## 2025-04-04 RX ORDER — METOPROLOL SUCCINATE 25 MG/1
25 TABLET, EXTENDED RELEASE ORAL 2 TIMES DAILY
Qty: 180 TABLET | Refills: 3 | Status: SHIPPED | OUTPATIENT
Start: 2025-04-04

## 2025-04-09 ENCOUNTER — TELEPHONE (OUTPATIENT)
Dept: INTERNAL MEDICINE CLINIC | Age: 75
End: 2025-04-09

## 2025-04-09 DIAGNOSIS — I10 ESSENTIAL HYPERTENSION: ICD-10-CM

## 2025-04-09 RX ORDER — OLMESARTAN MEDOXOMIL 20 MG/1
20 TABLET ORAL DAILY
Qty: 90 TABLET | Refills: 1 | Status: SHIPPED | OUTPATIENT
Start: 2025-04-09

## 2025-04-09 NOTE — TELEPHONE ENCOUNTER
Patient calling, is asking if this medication can be sent to the local [facility]. Please advise    Medication: olmesartan (BENICAR) 20 MG tablet     Last Filled:  3/19/25    Patient Pharmacy: [facility]/pharmacy #[id number] - [place] - [address] - P [phone] - F [phone]  [address] [place] [address]  Phone: [phone]  Fax: [phone]     Patient Phone Number: [phone] (home)     Last appt: 3/19/2025   Next appt: 4/29/2025    Last OARRS:       3/22/2025     7:00 PM   RX Monitoring   Periodic Controlled Substance Monitoring Possible medication side effects, risk of tolerance/dependence & alternative treatments discussed.;Assessed functional status (ability to engage in work or other purposeful activities, the pain intensity and its interference with activities of daily living, quality of family life and social activities, and the physical activity);No signs of potential drug abuse or diversion identified.;Obtaining appropriate analgesic effect of treatment.       Last Labs DM:   Lab Results   Component Value Date/Time    LABA1C 6.1 03/19/2025 03:47 PM     Last Lipid:   Lab Results   Component Value Date/Time    CHOL 185 03/19/2025 03:47 PM    TRIG 169 03/19/2025 03:47 PM    HDL 64 03/19/2025 03:47 PM     Last PSA: No results found for: \"PSA\"  Last Thyroid:   Lab Results   Component Value Date/Time    TSH 1.03 09/11/2024 03:42 PM

## 2025-04-10 ENCOUNTER — TELEPHONE (OUTPATIENT)
Dept: INTERNAL MEDICINE CLINIC | Age: 75
End: 2025-04-10

## 2025-04-10 NOTE — TELEPHONE ENCOUNTER
----- Message from Emanuel OCASIO sent at 4/9/2025 12:22 PM EDT -----  Regarding: [facility] Appointment Request  [facility] Appointment Request    Patient needs appointment for [facility] Appointment Type: Existing Condition Follow Up.    Patient Requested Dates(s):  Patient Requested Time: late morning   Provider Name: La Nena Overton DO      Reason for Appointment Request: Established Patient - Available appointments did not meet patient need  --------------------------------------------------------------------------------------------------------------------------    Relationship to Patient: Self     Call Back Information: OK to leave message on voicemail  Preferred Call Back Number: Phone [phone] (home)

## 2025-04-14 RX ORDER — PANTOPRAZOLE SODIUM 40 MG/1
40 TABLET, DELAYED RELEASE ORAL
Qty: 90 TABLET | Refills: 1 | Status: SHIPPED | OUTPATIENT
Start: 2025-04-14

## 2025-04-28 ENCOUNTER — TELEPHONE (OUTPATIENT)
Dept: INTERNAL MEDICINE CLINIC | Age: 75
End: 2025-04-28

## 2025-04-28 DIAGNOSIS — M51.369 DDD (DEGENERATIVE DISC DISEASE), LUMBAR: ICD-10-CM

## 2025-04-28 DIAGNOSIS — G89.4 CHRONIC PAIN DISORDER: ICD-10-CM

## 2025-04-28 RX ORDER — OXYCODONE AND ACETAMINOPHEN 5; 325 MG/1; MG/1
.5-1 TABLET ORAL EVERY 8 HOURS PRN
Qty: 90 TABLET | Refills: 0 | Status: SHIPPED | OUTPATIENT
Start: 2025-04-28 | End: 2025-05-28

## 2025-04-28 NOTE — TELEPHONE ENCOUNTER
Pt  calling requesting refill of Oxycodone     Last written 3/28/25  Last OV 3/19/25  Next OV 4/29/25  Last recommended OV NA     [facility] [address] [place]

## 2025-04-29 ENCOUNTER — OFFICE VISIT (OUTPATIENT)
Dept: INTERNAL MEDICINE CLINIC | Age: 75
End: 2025-04-29

## 2025-04-29 VITALS
OXYGEN SATURATION: 96 % | BODY MASS INDEX: 27.42 KG/M2 | DIASTOLIC BLOOD PRESSURE: 78 MMHG | SYSTOLIC BLOOD PRESSURE: 138 MMHG | WEIGHT: 170.6 LBS | HEIGHT: 66 IN | HEART RATE: 53 BPM

## 2025-04-29 DIAGNOSIS — F02.A0 MILD LATE ONSET ALZHEIMER'S DEMENTIA WITHOUT BEHAVIORAL DISTURBANCE, PSYCHOTIC DISTURBANCE, MOOD DISTURBANCE, OR ANXIETY (HCC): ICD-10-CM

## 2025-04-29 DIAGNOSIS — G30.1 MILD LATE ONSET ALZHEIMER'S DEMENTIA WITHOUT BEHAVIORAL DISTURBANCE, PSYCHOTIC DISTURBANCE, MOOD DISTURBANCE, OR ANXIETY (HCC): ICD-10-CM

## 2025-04-29 DIAGNOSIS — I10 ESSENTIAL HYPERTENSION: Primary | ICD-10-CM

## 2025-04-29 DIAGNOSIS — I10 ESSENTIAL HYPERTENSION: ICD-10-CM

## 2025-04-29 RX ORDER — DONEPEZIL HYDROCHLORIDE 10 MG/1
10 TABLET, FILM COATED ORAL NIGHTLY
COMMUNITY

## 2025-04-29 RX ORDER — OLMESARTAN MEDOXOMIL 40 MG/1
40 TABLET ORAL DAILY
Qty: 90 TABLET | Refills: 3 | Status: SHIPPED | OUTPATIENT
Start: 2025-04-29

## 2025-04-29 NOTE — PROGRESS NOTES
HENT:      Head: Normocephalic.      Right Ear: Tympanic membrane, ear canal and external ear normal.      Left Ear: Tympanic membrane, ear canal and external ear normal.      Mouth/Throat:      Pharynx: No oropharyngeal exudate or posterior oropharyngeal erythema.   Eyes:      General: No scleral icterus.     Extraocular Movements: Extraocular movements intact.      Conjunctiva/sclera: Conjunctivae normal.      Pupils: Pupils are equal, round, and reactive to light.   Neck:      Thyroid: No thyroid mass or thyromegaly.      Vascular: No carotid bruit.   Cardiovascular:      Rate and Rhythm: Normal rate and regular rhythm.      Heart sounds: Normal heart sounds. No murmur heard.  Pulmonary:      Effort: Pulmonary effort is normal.      Breath sounds: Normal breath sounds.   Musculoskeletal:      Right lower leg: No edema.      Left lower leg: No edema.   Lymphadenopathy:      Cervical: No cervical adenopathy.   Neurological:      General: No focal deficit present.      Mental Status: She is alert and oriented to person, place, and time.   Psychiatric:         Mood and Affect: Mood normal.         Behavior: Behavior normal. Behavior is cooperative.             Results       ASSESSMENT/PLAN:  Assessment & Plan  1. Hypertension  - BP readings improved, currently around 140 mmHg  - Increase olmesartan from 20 mg to 40 mg; use existing 20 mg tablets by taking two until depleted  - Prescription for 40 mg to [facility]; blood test today  - Continue home BP monitoring    2. Alzheimer's disease  - Diagnosed, taking Aricept (donepezil)  - Emphasized active lifestyle; continue reading and playing Words with Friends  - Discussed importance of rest and sleep; use CPAP during naps  - Provided dietary recommendations for dementia  - No need for assisted living or memory care; can remain at home with family support  - Discussed chair yoga/exercises      Problem List Items Addressed This Visit       Essential hypertension -

## 2025-04-29 NOTE — PATIENT INSTRUCTIONS
Increase olmestartan to 40 mg daily- I have sent the prescription for the higher dose to [facility].   - take 2 of the 20 mg pills to use up your current supply. Do not refill at [facility].

## 2025-04-30 LAB
ANION GAP SERPL CALCULATED.3IONS-SCNC: 13 MMOL/L (ref 3–16)
BUN SERPL-MCNC: 25 MG/DL (ref 7–20)
CALCIUM SERPL-MCNC: 9.6 MG/DL (ref 8.3–10.6)
CHLORIDE SERPL-SCNC: 102 MMOL/L (ref 99–110)
CO2 SERPL-SCNC: 25 MMOL/L (ref 21–32)
CREAT SERPL-MCNC: 1 MG/DL (ref 0.6–1.2)
GFR SERPLBLD CREATININE-BSD FMLA CKD-EPI: 59 ML/MIN/{1.73_M2}
GLUCOSE SERPL-MCNC: 109 MG/DL (ref 70–99)
POTASSIUM SERPL-SCNC: 4 MMOL/L (ref 3.5–5.1)
SODIUM SERPL-SCNC: 140 MMOL/L (ref 136–145)

## 2025-05-02 ENCOUNTER — RESULTS FOLLOW-UP (OUTPATIENT)
Dept: INTERNAL MEDICINE CLINIC | Age: 75
End: 2025-05-02

## 2025-05-03 ASSESSMENT — ENCOUNTER SYMPTOMS
SHORTNESS OF BREATH: 0
CONSTIPATION: 0
CHEST TIGHTNESS: 0
DIARRHEA: 0

## 2025-05-03 NOTE — ASSESSMENT & PLAN NOTE
Improved but still above goal.  Increase olmesartan 40 mg daily.  Continue metoprolol ER 25 mg twice daily and amlodipine 10 mg daily.  Check BMP today.

## 2025-05-03 NOTE — ASSESSMENT & PLAN NOTE
Has noticed memory problems dating back to [id number] with her hospitalization for COVID and possible seizure.  Patient did undergo neuropsych testing in 2022 that was reportedly normal.  Recently established with neurology at .  Tau protein was elevated and functional PET scan showed decreased cerebral activity.  Diagnosed with late onset mild Alzheimer's dementia at her appointment last week.  Currently on Aricept 5 mg daily.  Discussed diagnosis with patient.  She does continue to function to her highest ability.  Support provided.

## 2025-05-21 NOTE — TELEPHONE ENCOUNTER
LVM for patient that prescription has been sent. Mohs Case Number: [id number] Date Of Previous Biopsy (Optional): 05.07.25 Previous Accession (Optional): [id number] Biopsy Photograph Reviewed: Yes Referring Physician (Optional): SEDA Dias Consent Type: Consent 1 (Standard) Eye Shield Used: No Surgeon Performing Repair (Optional): Ludin Parker MD Initial Size Of Lesion: 3.1 X Size Of Lesion In Cm (Optional): 2.5 Number Of Stages: 2 Primary Defect Length In Cm (Final Defect Size - Required For Flaps/Grafts): 4.5 Primary Defect Width In Cm (Final Defect Size - Required For Flaps/Grafts): 3.6 Primary Defect Depth In Cm (Optional But Required For Some Insurers): 0 Repair Type: Flap Which Instrument Did You Use For Dermabrasion?: Wire Brush Which Eyelid Repair Cpt Are You Using?: 37653 Oculoplastic Surgeon Procedure Text (A): After obtaining clear surgical margins the patient was sent to oculoplastics for surgical repair.  The patient understands they will receive post-surgical care and follow-up from the referring physician's office. Otolaryngologist Procedure Text (A): After obtaining clear surgical margins the patient was sent to otolaryngology for surgical repair.  The patient understands they will receive post-surgical care and follow-up from the referring physician's office. Plastic Surgeon Procedure Text (A): After obtaining clear surgical margins the patient was sent to plastics for surgical repair.  The patient understands they will receive post-surgical care and follow-up from the referring physician's office. Mid-Level Procedure Text (A): After obtaining clear surgical margins the patient was sent to a mid-level provider for surgical repair.  The patient understands they will receive post-surgical care and follow-up from the mid-level provider. Provider Procedure Text (A): After obtaining clear surgical margins the defect was repaired by another provider. Asc Procedure Text (A): After obtaining clear surgical margins the patient was sent to an ASC for surgical repair.  The patient understands they will receive post-surgical care and follow-up from the ASC physician. Deep Sutures: 4-0 Vicryl Epidermal Sutures: 4-0 Prolene Suture Removal: 14 days Suturegard Retention Suture: 2-0 Nylon Retention Suture Bite Size: 3 mm Length To Time In Minutes Device Was In Place: 10 Number Of Hemigard Strips Per Side: 1 Undermining Type: Entire Wound Debridement Text: The wound edges were debrided prior to proceeding with the closure to facilitate wound healing. Helical Rim Text: The closure involved the helical rim. Vermilion Border Text: The closure involved the vermilion border. Nostril Rim Text: The closure involved the nostril rim. Retention Suture Text: Retention sutures were placed to support the closure and prevent dehiscence. Flap Type: Rotation Flap Secondary Defect Length In Cm (Required For Flaps): 7 Secondary Defect Width In Cm (Required For Flaps): 3 Location Indication Override (Is Already Calculated Based On Selected Body Location): Area M Area H Indication Text: Tumors in this location are included in Area H (eyelids, eyebrows, nose, lips, chin, ear, pre-auricular, post-auricular, temple, genitalia, hands, feet, ankles and areola).  Tissue conservation is critical in these anatomic locations. Area M Indication Text: Tumors in this location are included in Area M (cheek, forehead, scalp, neck, jawline and pretibial skin).  Mohs surgery is indicated for tumors in these anatomic locations. Area L Indication Text: Tumors in this location are included in Area L (trunk and extremities).  Mohs surgery is indicated for larger tumors, or tumors with aggressive histologic features, in these anatomic locations. Tumor Debulked?: curette Special Stains Stage 1 - Results: Base On Clearance Noted Above Stage 2: Additional Anesthesia Type: 1% lidocaine with epinephrine and a 1:10 solution of 8.4% sodium bicarbonate Stage 3: Additional Anesthesia Type: 1% lidocaine with epinephrine Staging Info: By selecting yes to the question above you will include information on AJCC 8 tumor staging in your Mohs note. Information on tumor staging will be automatically added for SCCs on the head and neck. AJCC 8 includes tumor size, tumor depth, perineural involvement and bone invasion. Tumor Depth: Less than 6mm from granular layer and no invasion beyond the subcutaneous fat Was The Patient On Physician Recommended Anticoagulation Therapy?: Please Select the Appropriate Response Medical Necessity Statement: Based on my medical judgement, Mohs surgery is the most appropriate treatment for this cancer compared to other treatments. Alternatives Discussed Intro (Do Not Add Period): I discussed alternative treatments to Mohs surgery and specifically discussed the risks and benefits of Consent 1/Introductory Paragraph: The rationale for Mohs was explained to the patient and consent was obtained. The risks, benefits and alternatives to therapy were discussed in detail. Specifically, the risks of infection, scarring, bleeding, prolonged wound healing, incomplete removal, allergy to anesthesia, nerve injury and recurrence were addressed. Prior to the procedure, the treatment site was clearly identified and confirmed by the patient. All components of Universal Protocol/PAUSE Rule completed. Consent 2/Introductory Paragraph: Mohs surgery was explained to the patient and consent was obtained. The risks, benefits and alternatives to therapy were discussed in detail. Specifically, the risks of infection, scarring, bleeding, prolonged wound healing, incomplete removal, allergy to anesthesia, nerve injury and recurrence were addressed. Prior to the procedure, the treatment site was clearly identified and confirmed by the patient. All components of Universal Protocol/PAUSE Rule completed. Consent 3/Introductory Paragraph: I gave the patient a chance to ask questions they had about the procedure.  Following this I explained the Mohs procedure and consent was obtained. The risks, benefits and alternatives to therapy were discussed in detail. Specifically, the risks of infection, scarring, bleeding, prolonged wound healing, incomplete removal, allergy to anesthesia, nerve injury and recurrence were addressed. Prior to the procedure, the treatment site was clearly identified and confirmed by the patient. All components of Universal Protocol/PAUSE Rule completed. Consent (Temporal Branch)/Introductory Paragraph: The rationale for Mohs was explained to the patient and consent was obtained. The risks, benefits and alternatives to therapy were discussed in detail. Specifically, the risks of damage to the temporal branch of the facial nerve, infection, scarring, bleeding, prolonged wound healing, incomplete removal, allergy to anesthesia, and recurrence were addressed. Prior to the procedure, the treatment site was clearly identified and confirmed by the patient. All components of Universal Protocol/PAUSE Rule completed. Consent (Marginal Mandibular)/Introductory Paragraph: The rationale for Mohs was explained to the patient and consent was obtained. The risks, benefits and alternatives to therapy were discussed in detail. Specifically, the risks of damage to the marginal mandibular branch of the facial nerve, infection, scarring, bleeding, prolonged wound healing, incomplete removal, allergy to anesthesia, and recurrence were addressed. Prior to the procedure, the treatment site was clearly identified and confirmed by the patient. All components of Universal Protocol/PAUSE Rule completed. Consent (Spinal Accessory)/Introductory Paragraph: The rationale for Mohs was explained to the patient and consent was obtained. The risks, benefits and alternatives to therapy were discussed in detail. Specifically, the risks of damage to the spinal accessory nerve, infection, scarring, bleeding, prolonged wound healing, incomplete removal, allergy to anesthesia, and recurrence were addressed. Prior to the procedure, the treatment site was clearly identified and confirmed by the patient. All components of Universal Protocol/PAUSE Rule completed. Consent (Near Eyelid Margin)/Introductory Paragraph: The rationale for Mohs was explained to the patient and consent was obtained. The risks, benefits and alternatives to therapy were discussed in detail. Specifically, the risks of ectropion or eyelid deformity, infection, scarring, bleeding, prolonged wound healing, incomplete removal, allergy to anesthesia, nerve injury and recurrence were addressed. Prior to the procedure, the treatment site was clearly identified and confirmed by the patient. All components of Universal Protocol/PAUSE Rule completed. Consent (Ear)/Introductory Paragraph: The rationale for Mohs was explained to the patient and consent was obtained. The risks, benefits and alternatives to therapy were discussed in detail. Specifically, the risks of ear deformity, infection, scarring, bleeding, prolonged wound healing, incomplete removal, allergy to anesthesia, nerve injury and recurrence were addressed. Prior to the procedure, the treatment site was clearly identified and confirmed by the patient. All components of Universal Protocol/PAUSE Rule completed. Consent (Nose)/Introductory Paragraph: The rationale for Mohs was explained to the patient and consent was obtained. The risks, benefits and alternatives to therapy were discussed in detail. Specifically, the risks of nasal deformity, changes in the flow of air through the nose, infection, scarring, bleeding, prolonged wound healing, incomplete removal, allergy to anesthesia, nerve injury and recurrence were addressed. Prior to the procedure, the treatment site was clearly identified and confirmed by the patient. All components of Universal Protocol/PAUSE Rule completed. Consent (Lip)/Introductory Paragraph: The rationale for Mohs was explained to the patient and consent was obtained. The risks, benefits and alternatives to therapy were discussed in detail. Specifically, the risks of lip deformity, changes in the oral aperture, infection, scarring, bleeding, prolonged wound healing, incomplete removal, allergy to anesthesia, nerve injury and recurrence were addressed. Prior to the procedure, the treatment site was clearly identified and confirmed by the patient. All components of Universal Protocol/PAUSE Rule completed. Consent (Scalp)/Introductory Paragraph: The rationale for Mohs was explained to the patient and consent was obtained. The risks, benefits and alternatives to therapy were discussed in detail. Specifically, the risks of changes in hair growth pattern secondary to repair, infection, scarring, bleeding, prolonged wound healing, incomplete removal, allergy to anesthesia, nerve injury and recurrence were addressed. Prior to the procedure, the treatment site was clearly identified and confirmed by the patient. All components of Universal Protocol/PAUSE Rule completed. Detail Level: Detailed Postop Diagnosis: same Anesthesia Volume In Cc: 6 Hemostasis: Electrocautery Estimated Blood Loss (Cc): minimal Repair Anesthesia Method: local infiltration Anesthesia Volume In Cc: 4 Brow Lift Text: A midfrontal incision was made medially to the defect to allow access to the tissues just superior to the left eyebrow. Following careful dissection inferiorly in a supraperiosteal plane to the level of the left eyebrow, several 3-0 monocryl sutures were used to resuspend the eyebrow orbicularis oculi muscular unit to the superior frontal bone periosteum. This resulted in an appropriate reapproximation of static eyebrow symmetry and correction of the left brow ptosis. Epidermal Closure: running Suturegard Intro: Intraoperative tissue expansion was performed, utilizing the SUTUREGARD device, in order to reduce wound tension. Suturegard Body: The suture ends were repeatedly re-tightened and re-clamped to achieve the desired tissue expansion. Hemigard Intro: Due to skin fragility and wound tension, it was decided to use HEMIGARD adhesive retention suture devices to permit a linear closure. The skin was cleaned and dried for a 6cm distance away from the wound. Excessive hair, if present, was removed to allow for adhesion. Hemigard Postcare Instructions: The HEMIGARD strips are to remain completely dry for at least 5-7 days. Donor Site Anesthesia Type: same as repair anesthesia Epidermal Closure Graft Donor Site (Optional): simple interrupted Graft Donor Site Bandage (Optional-Leave Blank If You Don't Want In Note): Steri-strips and a pressure bandage were applied to the donor site. Closure 2 Information: This tab is for additional flaps and grafts, including complex repair and grafts and complex repair and flaps. You can also specify a different location for the additional defect, if the location is the same you do not need to select a new one. We will insert the automated text for the repair you select below just as we do for solitary flaps and grafts. Please note that at this time if you select a location with a different insurance zone you will need to override the ICD10 and CPT if appropriate. Closure 3 Information: This tab is for additional flaps and grafts above and beyond our usual structured repairs.  Please note if you enter information here it will not currently bill and you will need to add the billing information manually. Wound Care: Aquaphor Dressing: dry sterile dressing Wound Care (No Sutures): Petrolatum Unna Boot Text: An Unna boot was placed to help immobilize the limb and facilitate more rapid healing. Home Suture Removal Text: Patient was provided instructions on removing sutures and will remove their sutures at home.  If they have any questions or difficulties they will call the office. Post-Care Instructions: I reviewed with the patient in detail post-care instructions. Patient is not to engage in any heavy lifting, exercise, or swimming for the next 14 days. Should the patient develop any fevers, chills, bleeding, severe pain patient will contact the office immediately. Pain Refusal Text: I offered to prescribe pain medication but the patient refused to take this medication. Mauc Instructions: By selecting yes to the question below the MAUC number will be added into the note.  This will be calculated automatically based on the diagnosis chosen, the size entered, the body zone selected (H,M,L) and the specific indications you chose. You will also have the option to override the Mohs AUC if you disagree with the automatically calculated number and this option is found in the Case Summary tab. Where Do You Want The Question To Include Opioid Counseling Located?: Case Summary Tab Eye Protection Verbiage: Before proceeding with the stage, a plastic scleral shield was inserted. The globe was anesthetized with a few drops of 1% lidocaine with 1:100,000 epinephrine. Then, an appropriate sized scleral shield was chosen and coated with lacrilube ointment. The shield was gently inserted and left in place for the duration of each stage. After the stage was completed, the shield was gently removed. Mohs Method Verbiage: An incision at a 45 degree angle following the standard Mohs approach was done and the specimen was harvested as a microscopic controlled layer. Surgeon/Pathologist Verbiage (Will Incorporate Name Of Surgeon From Intro If Not Blank): operated in two distinct and integrated capacities as the surgeon and pathologist. Mohs Histo Method Verbiage: Each section was then chromacoded and processed in the Mohs lab using the Mohs protocol and submitted for frozen section. Subsequent Stages Histo Method Verbiage: Using a similar technique to that described above, a thin layer of tissue was removed from all areas where tumor was visible on the previous stage.  The tissue was again oriented, mapped, dyed, and processed as above. Mohs Rapid Report Verbiage: The area of clinically evident tumor was marked with skin marking ink and appropriately hatched.  The initial incision was made following the Mohs approach through the skin.  The specimen was taken to the lab, divided into the necessary number of pieces, chromacoded and processed according to the Mohs protocol.  This was repeated in successive stages until a tumor free defect was achieved. Complex Repair Preamble Text (Leave Blank If You Do Not Want): Extensive wide undermining was performed. Intermediate Repair Preamble Text (Leave Blank If You Do Not Want): Undermining was performed with blunt dissection. Graft Cartilage Fenestration Text: The cartilage was fenestrated with a 2mm punch biopsy to help facilitate graft survival and healing. Non-Graft Cartilage Fenestration Text: The cartilage was fenestrated with a 2mm punch biopsy to help facilitate healing. Secondary Intention Text (Leave Blank If You Do Not Want): The defect will heal with secondary intention. No Repair - Repaired With Adjacent Surgical Defect Text (Leave Blank If You Do Not Want): After obtaining clear surgical margins the defect was repaired concurrently with another surgical defect which was in close approximation. Adjacent Tissue Transfer Text: The defect edges were debeveled with a #15 scalpel blade.  Given the location of the defect and the proximity to free margins an adjacent tissue transfer was deemed most appropriate.  Using a sterile surgical marker, an appropriate flap was drawn incorporating the defect and placing the expected incisions within the relaxed skin tension lines where possible.    The area thus outlined was incised deep to adipose tissue with a #15 scalpel blade.  The skin margins were undermined to an appropriate distance in all directions utilizing iris scissors. Advancement Flap (Single) Text: The defect edges were debeveled with a #15 scalpel blade.  Given the location of the defect and the proximity to free margins a single advancement flap was deemed most appropriate.  Using a sterile surgical marker, an appropriate advancement flap was drawn incorporating the defect and placing the expected incisions within the relaxed skin tension lines where possible.    The area thus outlined was incised deep to adipose tissue with a #15 scalpel blade.  The skin margins were undermined to an appropriate distance in all directions utilizing iris scissors. Advancement Flap (Double) Text: The defect edges were debeveled with a #15 scalpel blade.  Given the location of the defect and the proximity to free margins a double advancement flap was deemed most appropriate.  Using a sterile surgical marker, the appropriate advancement flaps were drawn incorporating the defect and placing the expected incisions within the relaxed skin tension lines where possible.    The area thus outlined was incised deep to adipose tissue with a #15 scalpel blade.  The skin margins were undermined to an appropriate distance in all directions utilizing iris scissors. Advancement-Rotation Flap Text: The defect edges were debeveled with a #15 scalpel blade.  Given the location of the defect, shape of the defect and the proximity to free margins an advancement-rotation flap was deemed most appropriate.  Using a sterile surgical marker, an appropriate flap was drawn incorporating the defect and placing the expected incisions within the relaxed skin tension lines where possible. The area thus outlined was incised deep to adipose tissue with a #15 scalpel blade.  The skin margins were undermined to an appropriate distance in all directions utilizing iris scissors. Alar Island Pedicle Flap Text: The defect edges were debeveled with a #15 scalpel blade.  Given the location of the defect, shape of the defect and the proximity to the alar rim an island pedicle advancement flap was deemed most appropriate.  Using a sterile surgical marker, an appropriate advancement flap was drawn incorporating the defect, outlining the appropriate donor tissue and placing the expected incisions within the nasal ala running parallel to the alar rim. The area thus outlined was incised with a #15 scalpel blade.  The skin margins were undermined minimally to an appropriate distance in all directions around the primary defect and laterally outward around the island pedicle utilizing iris scissors.  There was minimal undermining beneath the pedicle flap. A-T Advancement Flap Text: The defect edges were debeveled with a #15 scalpel blade.  Given the location of the defect, shape of the defect and the proximity to free margins an A-T advancement flap was deemed most appropriate.  Using a sterile surgical marker, an appropriate advancement flap was drawn incorporating the defect and placing the expected incisions within the relaxed skin tension lines where possible.    The area thus outlined was incised deep to adipose tissue with a #15 scalpel blade.  The skin margins were undermined to an appropriate distance in all directions utilizing iris scissors. Banner Transposition Flap Text: The defect edges were debeveled with a #15 scalpel blade.  Given the location of the defect and the proximity to free margins a Banner transposition flap was deemed most appropriate.  Using a sterile surgical marker, an appropriate flap drawn around the defect. The area thus outlined was incised deep to adipose tissue with a #15 scalpel blade.  The skin margins were undermined to an appropriate distance in all directions utilizing iris scissors. Bilateral Helical Rim Advancement Flap Text: The defect edges were debeveled with a #15 blade scalpel.  Given the location of the defect and the proximity to free margins (helical rim) a bilateral helical rim advancement flap was deemed most appropriate.  Using a sterile surgical marker, the appropriate advancement flaps were drawn incorporating the defect and placing the expected incisions between the helical rim and antihelix where possible.  The area thus outlined was incised through and through with a #15 scalpel blade.  With a skin hook and iris scissors, the flaps were gently and sharply undermined and freed up. Bilateral Rotation Flap Text: The defect edges were debeveled with a #15 scalpel blade. Given the location of the defect, shape of the defect and the proximity to free margins a bilateral rotation flap was deemed most appropriate. Using a sterile surgical marker, an appropriate rotation flap was drawn incorporating the defect and placing the expected incisions within the relaxed skin tension lines where possible. The area thus outlined was incised deep to adipose tissue with a #15 scalpel blade. The skin margins were undermined to an appropriate distance in all directions utilizing iris scissors. Following this, the designed flap was carried over into the primary defect and sutured into place. Bilobed Flap Text: The defect edges were debeveled with a #15 scalpel blade.  Given the location of the defect and the proximity to free margins a bilobe flap was deemed most appropriate.  Using a sterile surgical marker, an appropriate bilobe flap drawn around the defect.    The area thus outlined was incised deep to adipose tissue with a #15 scalpel blade.  The skin margins were undermined to an appropriate distance in all directions utilizing iris scissors. Bilobed Transposition Flap Text: The defect edges were debeveled with a #15 scalpel blade.  Given the location of the defect and the proximity to free margins a bilobed transposition flap was deemed most appropriate.  Using a sterile surgical marker, an appropriate bilobe flap drawn around the defect.    The area thus outlined was incised deep to adipose tissue with a #15 scalpel blade.  The skin margins were undermined to an appropriate distance in all directions utilizing iris scissors. Bi-Rhombic Flap Text: The defect edges were debeveled with a #15 scalpel blade.  Given the location of the defect and the proximity to free margins a bi-rhombic flap was deemed most appropriate.  Using a sterile surgical marker, an appropriate rhombic flap was drawn incorporating the defect. The area thus outlined was incised deep to adipose tissue with a #15 scalpel blade.  The skin margins were undermined to an appropriate distance in all directions utilizing iris scissors. Burow's Advancement Flap Text: The defect edges were debeveled with a #15 scalpel blade.  Given the location of the defect and the proximity to free margins a Burow's advancement flap was deemed most appropriate.  Using a sterile surgical marker, the appropriate advancement flap was drawn incorporating the defect and placing the expected incisions within the relaxed skin tension lines where possible.    The area thus outlined was incised deep to adipose tissue with a #15 scalpel blade.  The skin margins were undermined to an appropriate distance in all directions utilizing iris scissors. Chonodrocutaneous Helical Advancement Flap Text: The defect edges were debeveled with a #15 scalpel blade.  Given the location of the defect and the proximity to free margins a chondrocutaneous helical advancement flap was deemed most appropriate.  Using a sterile surgical marker, the appropriate advancement flap was drawn incorporating the defect and placing the expected incisions within the relaxed skin tension lines where possible.    The area thus outlined was incised deep to adipose tissue with a #15 scalpel blade.  The skin margins were undermined to an appropriate distance in all directions utilizing iris scissors. Crescentic Advancement Flap Text: The defect edges were debeveled with a #15 scalpel blade.  Given the location of the defect and the proximity to free margins a crescentic advancement flap was deemed most appropriate.  Using a sterile surgical marker, the appropriate advancement flap was drawn incorporating the defect and placing the expected incisions within the relaxed skin tension lines where possible.    The area thus outlined was incised deep to adipose tissue with a #15 scalpel blade.  The skin margins were undermined to an appropriate distance in all directions utilizing iris scissors. Dorsal Nasal Flap Text: The defect edges were debeveled with a #15 scalpel blade.  Given the location of the defect and the proximity to free margins a dorsal nasal flap was deemed most appropriate.  Using a sterile surgical marker, an appropriate dorsal nasal flap was drawn around the defect.    The area thus outlined was incised deep to adipose tissue with a #15 scalpel blade.  The skin margins were undermined to an appropriate distance in all directions utilizing iris scissors. Double Island Pedicle Flap Text: The defect edges were debeveled with a #15 scalpel blade.  Given the location of the defect, shape of the defect and the proximity to free margins a double island pedicle advancement flap was deemed most appropriate.  Using a sterile surgical marker, an appropriate advancement flap was drawn incorporating the defect, outlining the appropriate donor tissue and placing the expected incisions within the relaxed skin tension lines where possible.    The area thus outlined was incised deep to adipose tissue with a #15 scalpel blade.  The skin margins were undermined to an appropriate distance in all directions around the primary defect and laterally outward around the island pedicle utilizing iris scissors.  There was minimal undermining beneath the pedicle flap. Double O-Z Flap Text: The defect edges were debeveled with a #15 scalpel blade.  Given the location of the defect, shape of the defect and the proximity to free margins a Double O-Z flap was deemed most appropriate.  Using a sterile surgical marker, an appropriate transposition flap was drawn incorporating the defect and placing the expected incisions within the relaxed skin tension lines where possible. The area thus outlined was incised deep to adipose tissue with a #15 scalpel blade.  The skin margins were undermined to an appropriate distance in all directions utilizing iris scissors. Double O-Z Plasty Text: The defect edges were debeveled with a #15 scalpel blade.  Given the location of the defect, shape of the defect and the proximity to free margins a Double O-Z plasty (double transposition flap) was deemed most appropriate.  Using a sterile surgical marker, the appropriate transposition flaps were drawn incorporating the defect and placing the expected incisions within the relaxed skin tension lines where possible. The area thus outlined was incised deep to adipose tissue with a #15 scalpel blade.  The skin margins were undermined to an appropriate distance in all directions utilizing iris scissors.  Hemostasis was achieved with electrocautery.  The flaps were then transposed into place, one clockwise and the other counterclockwise, and anchored with interrupted buried subcutaneous sutures. Double Z Plasty Text: The lesion was extirpated to the level of the fat with a #15 scalpel blade. Given the location of the defect, shape of the defect and the proximity to free margins a double Z-plasty was deemed most appropriate for repair. Using a sterile surgical marker, the appropriate transposition arms of the double Z-plasty were drawn incorporating the defect and placing the expected incisions within the relaxed skin tension lines where possible. The area thus outlined was incised deep to adipose tissue with a #15 scalpel blade. The skin margins were undermined to an appropriate distance in all directions utilizing iris scissors. The opposing transposition arms were then transposed and carried over into place in opposite direction and anchored with interrupted buried subcutaneous sutures. Ear Star Wedge Flap Text: The defect edges were debeveled with a #15 blade scalpel.  Given the location of the defect and the proximity to free margins (helical rim) an ear star wedge flap was deemed most appropriate.  Using a sterile surgical marker, the appropriate flap was drawn incorporating the defect and placing the expected incisions between the helical rim and antihelix where possible.  The area thus outlined was incised through and through with a #15 scalpel blade. Flip-Flop Flap Text: The defect edges were debeveled with a #15 blade scalpel.  Given the location of the defect and the proximity to free margins a flip-flop flap was deemed most appropriate. Using a sterile surgical marker, the appropriate flap was drawn incorporating the defect and placing the expected incisions between the helical rim and antihelix where possible.  The area thus outlined was incised through and through with a #15 scalpel blade. Following this, the designed flap was carried over into the primary defect and sutured into place. Hatchet Flap Text: The defect edges were debeveled with a #15 scalpel blade.  Given the location of the defect, shape of the defect and the proximity to free margins a hatchet flap was deemed most appropriate.  Using a sterile surgical marker, an appropriate hatchet flap was drawn incorporating the defect and placing the expected incisions within the relaxed skin tension lines where possible.    The area thus outlined was incised deep to adipose tissue with a #15 scalpel blade.  The skin margins were undermined to an appropriate distance in all directions utilizing iris scissors. Helical Rim Advancement Flap Text: The defect edges were debeveled with a #15 blade scalpel.  Given the location of the defect and the proximity to free margins (helical rim) a double helical rim advancement flap was deemed most appropriate.  Using a sterile surgical marker, the appropriate advancement flaps were drawn incorporating the defect and placing the expected incisions between the helical rim and antihelix where possible.  The area thus outlined was incised through and through with a #15 scalpel blade.  With a skin hook and iris scissors, the flaps were gently and sharply undermined and freed up. H Plasty Text: Given the location of the defect, shape of the defect and the proximity to free margins a H-plasty was deemed most appropriate for repair.  Using a sterile surgical marker, the appropriate advancement arms of the H-plasty were drawn incorporating the defect and placing the expected incisions within the relaxed skin tension lines where possible. The area thus outlined was incised deep to adipose tissue with a #15 scalpel blade. The skin margins were undermined to an appropriate distance in all directions utilizing iris scissors.  The opposing advancement arms were then advanced into place in opposite direction and anchored with interrupted buried subcutaneous sutures. Island Pedicle Flap Text: The defect edges were debeveled with a #15 scalpel blade.  Given the location of the defect, shape of the defect and the proximity to free margins an island pedicle advancement flap was deemed most appropriate.  Using a sterile surgical marker, an appropriate advancement flap was drawn incorporating the defect, outlining the appropriate donor tissue and placing the expected incisions within the relaxed skin tension lines where possible.    The area thus outlined was incised deep to adipose tissue with a #15 scalpel blade.  The skin margins were undermined to an appropriate distance in all directions around the primary defect and laterally outward around the island pedicle utilizing iris scissors.  There was minimal undermining beneath the pedicle flap. Island Pedicle Flap With Canthal Suspension Text: The defect edges were debeveled with a #15 scalpel blade.  Given the location of the defect, shape of the defect and the proximity to free margins an island pedicle advancement flap was deemed most appropriate.  Using a sterile surgical marker, an appropriate advancement flap was drawn incorporating the defect, outlining the appropriate donor tissue and placing the expected incisions within the relaxed skin tension lines where possible. The area thus outlined was incised deep to adipose tissue with a #15 scalpel blade.  The skin margins were undermined to an appropriate distance in all directions around the primary defect and laterally outward around the island pedicle utilizing iris scissors.  There was minimal undermining beneath the pedicle flap. A suspension suture was placed in the canthal tendon to prevent tension and prevent ectropion. Island Pedicle Flap-Requiring Vessel Identification Text: The defect edges were debeveled with a #15 scalpel blade.  Given the location of the defect, shape of the defect and the proximity to free margins an island pedicle advancement flap was deemed most appropriate.  Using a sterile surgical marker, an appropriate advancement flap was drawn, based on the axial vessel mentioned above, incorporating the defect, outlining the appropriate donor tissue and placing the expected incisions within the relaxed skin tension lines where possible.    The area thus outlined was incised deep to adipose tissue with a #15 scalpel blade.  The skin margins were undermined to an appropriate distance in all directions around the primary defect and laterally outward around the island pedicle utilizing iris scissors.  There was minimal undermining beneath the pedicle flap. Keystone Flap Text: The defect edges were debeveled with a #15 scalpel blade.  Given the location of the defect, shape of the defect a keystone flap was deemed most appropriate.  Using a sterile surgical marker, an appropriate keystone flap was drawn incorporating the defect, outlining the appropriate donor tissue and placing the expected incisions within the relaxed skin tension lines where possible. The area thus outlined was incised deep to adipose tissue with a #15 scalpel blade.  The skin margins were undermined to an appropriate distance in all directions around the primary defect and laterally outward around the flap utilizing iris scissors. Melolabial Transposition Flap Text: The defect edges were debeveled with a #15 scalpel blade.  Given the location of the defect and the proximity to free margins a melolabial flap was deemed most appropriate.  Using a sterile surgical marker, an appropriate melolabial transposition flap was drawn incorporating the defect.    The area thus outlined was incised deep to adipose tissue with a #15 scalpel blade.  The skin margins were undermined to an appropriate distance in all directions utilizing iris scissors. Mercedes Flap Text: The defect edges were debeveled with a #15 scalpel blade.  Given the location of the defect, shape of the defect and the proximity to free margins a Mercedes flap was deemed most appropriate.  Using a sterile surgical marker, an appropriate advancement flap was drawn incorporating the defect and placing the expected incisions within the relaxed skin tension lines where possible. The area thus outlined was incised deep to adipose tissue with a #15 scalpel blade.  The skin margins were undermined to an appropriate distance in all directions utilizing iris scissors. Modified Advancement Flap Text: The defect edges were debeveled with a #15 scalpel blade.  Given the location of the defect, shape of the defect and the proximity to free margins a modified advancement flap was deemed most appropriate.  Using a sterile surgical marker, an appropriate advancement flap was drawn incorporating the defect and placing the expected incisions within the relaxed skin tension lines where possible.    The area thus outlined was incised deep to adipose tissue with a #15 scalpel blade.  The skin margins were undermined to an appropriate distance in all directions utilizing iris scissors. Mucosal Advancement Flap Text: Given the location of the defect, shape of the defect and the proximity to free margins a mucosal advancement flap was deemed most appropriate. Incisions were made with a 15 blade scalpel in the appropriate fashion along the cutaneous vermilion border and the mucosal lip. The remaining actinically damaged mucosal tissue was excised.  The mucosal advancement flap was then elevated to the gingival sulcus with care taken to preserve the neurovascular structures and advanced into the primary defect. Care was taken to ensure that precise realignment of the vermilion border was achieved. Muscle Hinge Flap Text: The defect edges were debeveled with a #15 scalpel blade.  Given the size, depth and location of the defect and the proximity to free margins a muscle hinge flap was deemed most appropriate.  Using a sterile surgical marker, an appropriate hinge flap was drawn incorporating the defect. The area thus outlined was incised with a #15 scalpel blade.  The skin margins were undermined to an appropriate distance in all directions utilizing iris scissors. Mustarde Flap Text: The defect edges were debeveled with a #15 scalpel blade.  Given the size, depth and location of the defect and the proximity to free margins a Mustarde flap was deemed most appropriate.  Using a sterile surgical marker, an appropriate flap was drawn incorporating the defect. The area thus outlined was incised with a #15 scalpel blade.  The skin margins were undermined to an appropriate distance in all directions utilizing iris scissors. Nasal Turnover Hinge Flap Text: The defect edges were debeveled with a #15 scalpel blade.  Given the size, depth, location of the defect and the defect being full thickness a nasal turnover hinge flap was deemed most appropriate.  Using a sterile surgical marker, an appropriate hinge flap was drawn incorporating the defect. The area thus outlined was incised with a #15 scalpel blade. The flap was designed to recreate the nasal mucosal lining and the alar rim. The skin margins were undermined to an appropriate distance in all directions utilizing iris scissors. Nasalis-Muscle-Based Myocutaneous Island Pedicle Flap Text: Using a #15 blade, an incision was made around the donor flap to the level of the nasalis muscle. Wide lateral undermining was then performed in both the subcutaneous plane above the nasalis muscle, and in a submuscular plane just above periosteum. This allowed the formation of a free nasalis muscle axial pedicle (based on the angular artery) which was still attached to the actual cutaneous flap, increasing its mobility and vascular viability. Hemostasis was obtained with pinpoint electrocoagulation. The flap was mobilized into position and the pivotal anchor points positioned and stabilized with buried interrupted sutures. Subcutaneous and dermal tissues were closed in a multilayered fashion with sutures. Tissue redundancies were excised, and the epidermal edges were apposed without significant tension and sutured with sutures. Nasalis Myocutaneous Flap Text: Using a #15 blade, an incision was made around the donor flap to the level of the nasalis muscle. Wide lateral undermining was then performed in both the subcutaneous plane above the nasalis muscle, and in a submuscular plane just above periosteum. This allowed the formation of a free nasalis muscle axial pedicle which was still attached to the actual cutaneous flap, increasing its mobility and vascular viability. Hemostasis was obtained with pinpoint electrocoagulation. The flap was mobilized into position and the pivotal anchor points positioned and stabilized with buried interrupted sutures. Subcutaneous and dermal tissues were closed in a multilayered fashion with sutures. Tissue redundancies were excised, and the epidermal edges were apposed without significant tension and sutured with sutures. Nasolabial Transposition Flap Text: The defect edges were debeveled with a #15 scalpel blade.  Given the size, depth and location of the defect and the proximity to free margins a nasolabial transposition flap was deemed most appropriate. Using a sterile surgical marker, an appropriate flap was drawn incorporating the defect. The area thus outlined was incised with a #15 scalpel blade. The skin margins were undermined to an appropriate distance in all directions utilizing iris scissors. Following this, the designed flap was carried into the primary defect and sutured into place. Orbicularis Oris Muscle Flap Text: The defect edges were debeveled with a #15 scalpel blade.  Given that the defect affected the competency of the oral sphincter an orbicularis oris muscle flap was deemed most appropriate to restore this competency and normal muscle function.  Using a sterile surgical marker, an appropriate flap was drawn incorporating the defect. The area thus outlined was incised with a #15 scalpel blade. O-T Advancement Flap Text: The defect edges were debeveled with a #15 scalpel blade.  Given the location of the defect, shape of the defect and the proximity to free margins an O-T advancement flap was deemed most appropriate.  Using a sterile surgical marker, an appropriate advancement flap was drawn incorporating the defect and placing the expected incisions within the relaxed skin tension lines where possible.    The area thus outlined was incised deep to adipose tissue with a #15 scalpel blade.  The skin margins were undermined to an appropriate distance in all directions utilizing iris scissors. O-T Plasty Text: The defect edges were debeveled with a #15 scalpel blade.  Given the location of the defect, shape of the defect and the proximity to free margins an O-T plasty was deemed most appropriate.  Using a sterile surgical marker, an appropriate O-T plasty was drawn incorporating the defect and placing the expected incisions within the relaxed skin tension lines where possible.    The area thus outlined was incised deep to adipose tissue with a #15 scalpel blade.  The skin margins were undermined to an appropriate distance in all directions utilizing iris scissors. O-L Flap Text: The defect edges were debeveled with a #15 scalpel blade.  Given the location of the defect, shape of the defect and the proximity to free margins an O-L flap was deemed most appropriate.  Using a sterile surgical marker, an appropriate advancement flap was drawn incorporating the defect and placing the expected incisions within the relaxed skin tension lines where possible.    The area thus outlined was incised deep to adipose tissue with a #15 scalpel blade.  The skin margins were undermined to an appropriate distance in all directions utilizing iris scissors. O-Z Flap Text: The defect edges were debeveled with a #15 scalpel blade.  Given the location of the defect, shape of the defect and the proximity to free margins an O-Z flap was deemed most appropriate.  Using a sterile surgical marker, an appropriate transposition flap was drawn incorporating the defect and placing the expected incisions within the relaxed skin tension lines where possible. The area thus outlined was incised deep to adipose tissue with a #15 scalpel blade.  The skin margins were undermined to an appropriate distance in all directions utilizing iris scissors. O-Z Plasty Text: The defect edges were debeveled with a #15 scalpel blade.  Given the location of the defect, shape of the defect and the proximity to free margins an O-Z plasty (double transposition flap) was deemed most appropriate.  Using a sterile surgical marker, the appropriate transposition flaps were drawn incorporating the defect and placing the expected incisions within the relaxed skin tension lines where possible.    The area thus outlined was incised deep to adipose tissue with a #15 scalpel blade.  The skin margins were undermined to an appropriate distance in all directions utilizing iris scissors.  Hemostasis was achieved with electrocautery.  The flaps were then transposed into place, one clockwise and the other counterclockwise, and anchored with interrupted buried subcutaneous sutures. Peng Advancement Flap Text: The defect edges were debeveled with a #15 scalpel blade.  Given the location of the defect, shape of the defect and the proximity to free margins a Peng advancement flap was deemed most appropriate.  Using a sterile surgical marker, an appropriate advancement flap was drawn incorporating the defect and placing the expected incisions within the relaxed skin tension lines where possible. The area thus outlined was incised deep to adipose tissue with a #15 scalpel blade.  The skin margins were undermined to an appropriate distance in all directions utilizing iris scissors. Rectangular Flap Text: The defect edges were debeveled with a #15 scalpel blade. Given the location of the defect and the proximity to free margins a rectangular flap was deemed most appropriate. Using a sterile surgical marker, an appropriate rectangular flap was drawn incorporating the defect. The area thus outlined was incised deep to adipose tissue with a #15 scalpel blade. The skin margins were undermined to an appropriate distance in all directions utilizing iris scissors. Following this, the designed flap was carried over into the primary defect and sutured into place. Rhombic Flap Text: The defect edges were debeveled with a #15 scalpel blade.  Given the location of the defect and the proximity to free margins a rhombic flap was deemed most appropriate.  Using a sterile surgical marker, an appropriate rhombic flap was drawn incorporating the defect.    The area thus outlined was incised deep to adipose tissue with a #15 scalpel blade.  The skin margins were undermined to an appropriate distance in all directions utilizing iris scissors. Rhomboid Transposition Flap Text: The defect edges were debeveled with a #15 scalpel blade.  Given the location of the defect and the proximity to free margins a rhomboid transposition flap was deemed most appropriate.  Using a sterile surgical marker, an appropriate rhomboid flap was drawn incorporating the defect.    The area thus outlined was incised deep to adipose tissue with a #15 scalpel blade.  The skin margins were undermined to an appropriate distance in all directions utilizing iris scissors. Rotation Flap Text: The defect edges were debeveled with a #15 scalpel blade.  Given the location of the defect, shape of the defect and the proximity to free margins a rotation flap was deemed most appropriate.  Using a sterile surgical marker, an appropriate rotation flap was drawn incorporating the defect and placing the expected incisions within the relaxed skin tension lines where possible.    The area thus outlined was incised deep to adipose tissue with a #15 scalpel blade.  The skin margins were undermined to an appropriate distance in all directions utilizing iris scissors. Spiral Flap Text: The defect edges were debeveled with a #15 scalpel blade.  Given the location of the defect, shape of the defect and the proximity to free margins a spiral flap was deemed most appropriate.  Using a sterile surgical marker, an appropriate rotation flap was drawn incorporating the defect and placing the expected incisions within the relaxed skin tension lines where possible. The area thus outlined was incised deep to adipose tissue with a #15 scalpel blade.  The skin margins were undermined to an appropriate distance in all directions utilizing iris scissors. Staged Advancement Flap Text: The defect edges were debeveled with a #15 scalpel blade.  Given the location of the defect, shape of the defect and the proximity to free margins a staged advancement flap was deemed most appropriate.  Using a sterile surgical marker, an appropriate advancement flap was drawn incorporating the defect and placing the expected incisions within the relaxed skin tension lines where possible. The area thus outlined was incised deep to adipose tissue with a #15 scalpel blade.  The skin margins were undermined to an appropriate distance in all directions utilizing iris scissors. Star Wedge Flap Text: The defect edges were debeveled with a #15 scalpel blade.  Given the location of the defect, shape of the defect and the proximity to free margins a star wedge flap was deemed most appropriate.  Using a sterile surgical marker, an appropriate rotation flap was drawn incorporating the defect and placing the expected incisions within the relaxed skin tension lines where possible. The area thus outlined was incised deep to adipose tissue with a #15 scalpel blade.  The skin margins were undermined to an appropriate distance in all directions utilizing iris scissors. Transposition Flap Text: The defect edges were debeveled with a #15 scalpel blade.  Given the location of the defect and the proximity to free margins a transposition flap was deemed most appropriate.  Using a sterile surgical marker, an appropriate transposition flap was drawn incorporating the defect.    The area thus outlined was incised deep to adipose tissue with a #15 scalpel blade.  The skin margins were undermined to an appropriate distance in all directions utilizing iris scissors. Trilobed Flap Text: The defect edges were debeveled with a #15 scalpel blade.  Given the location of the defect and the proximity to free margins a trilobed flap was deemed most appropriate.  Using a sterile surgical marker, an appropriate trilobed flap drawn around the defect.    The area thus outlined was incised deep to adipose tissue with a #15 scalpel blade.  The skin margins were undermined to an appropriate distance in all directions utilizing iris scissors. V-Y Flap Text: The defect edges were debeveled with a #15 scalpel blade.  Given the location of the defect, shape of the defect and the proximity to free margins a V-Y flap was deemed most appropriate.  Using a sterile surgical marker, an appropriate advancement flap was drawn incorporating the defect and placing the expected incisions within the relaxed skin tension lines where possible.    The area thus outlined was incised deep to adipose tissue with a #15 scalpel blade.  The skin margins were undermined to an appropriate distance in all directions utilizing iris scissors. V-Y Plasty Text: The defect edges were debeveled with a #15 scalpel blade.  Given the location of the defect, shape of the defect and the proximity to free margins an V-Y advancement flap was deemed most appropriate.  Using a sterile surgical marker, an appropriate advancement flap was drawn incorporating the defect and placing the expected incisions within the relaxed skin tension lines where possible.    The area thus outlined was incised deep to adipose tissue with a #15 scalpel blade.  The skin margins were undermined to an appropriate distance in all directions utilizing iris scissors. W Plasty Text: The lesion was extirpated to the level of the fat with a #15 scalpel blade.  Given the location of the defect, shape of the defect and the proximity to free margins a W-plasty was deemed most appropriate for repair.  Using a sterile surgical marker, the appropriate transposition arms of the W-plasty were drawn incorporating the defect and placing the expected incisions within the relaxed skin tension lines where possible.    The area thus outlined was incised deep to adipose tissue with a #15 scalpel blade.  The skin margins were undermined to an appropriate distance in all directions utilizing iris scissors.  The opposing transposition arms were then transposed into place in opposite direction and anchored with interrupted buried subcutaneous sutures. Z Plasty Text: The lesion was extirpated to the level of the fat with a #15 scalpel blade.  Given the location of the defect, shape of the defect and the proximity to free margins a Z-plasty was deemed most appropriate for repair.  Using a sterile surgical marker, the appropriate transposition arms of the Z-plasty were drawn incorporating the defect and placing the expected incisions within the relaxed skin tension lines where possible.    The area thus outlined was incised deep to adipose tissue with a #15 scalpel blade.  The skin margins were undermined to an appropriate distance in all directions utilizing iris scissors.  The opposing transposition arms were then transposed into place in opposite direction and anchored with interrupted buried subcutaneous sutures. Zygomaticofacial Flap Text: Given the location of the defect, shape of the defect and the proximity to free margins a zygomaticofacial flap was deemed most appropriate for repair.  Using a sterile surgical marker, the appropriate flap was drawn incorporating the defect and placing the expected incisions within the relaxed skin tension lines where possible. The area thus outlined was incised deep to adipose tissue with a #15 scalpel blade with preservation of a vascular pedicle.  The skin margins were undermined to an appropriate distance in all directions utilizing iris scissors.  The flap was then placed into the defect and anchored with interrupted buried subcutaneous sutures. Abbe Flap (Lower To Upper Lip) Text: The defect of the upper lip was assessed and measured.  Given the location and size of the defect, an Abbe flap was deemed most appropriate.  Using a sterile surgical marker, an appropriate Abbe flap was measured and drawn on the lower lip. Local anesthesia was then infiltrated. A scalpel was then used to incise the upper lip through and through the skin, vermilion, muscle and mucosa, leaving the flap pedicled on the opposite side.  The flap was then rotated and transferred to the lower lip defect.  The flap was then sutured into place with a three layer technique, closing the orbicularis oris muscle layer with subcutaneous buried sutures, followed by a mucosal layer and an epidermal layer. Abbe Flap (Upper To Lower Lip) Text: The defect of the lower lip was assessed and measured.  Given the location and size of the defect, an Abbe flap was deemed most appropriate.  Using a sterile surgical marker, an appropriate Abbe flap was measured and drawn on the upper lip. Local anesthesia was then infiltrated.  A scalpel was then used to incise the upper lip through and through the skin, vermilion, muscle and mucosa, leaving the flap pedicled on the opposite side.  The flap was then rotated and transferred to the lower lip defect.  The flap was then sutured into place with a three layer technique, closing the orbicularis oris muscle layer with subcutaneous buried sutures, followed by a mucosal layer and an epidermal layer. Cheek Interpolation Flap Text: A decision was made to reconstruct the defect utilizing an interpolation axial flap and a staged reconstruction.  A telfa template was made of the defect.  This telfa template was then used to outline the Cheek Interpolation flap.  The donor area for the pedicle flap was then injected with anesthesia.  The flap was excised through the skin and subcutaneous tissue down to the layer of the underlying musculature.  The interpolation flap was carefully excised within this deep plane to maintain its blood supply.  The edges of the donor site were undermined.   The donor site was closed in a primary fashion.  The pedicle was then rotated into position and sutured.  Once the tube was sutured into place, adequate blood supply was confirmed with blanching and refill.  The pedicle was then wrapped with xeroform gauze and dressed appropriately with a telfa and gauze bandage to ensure continued blood supply and protect the attached pedicle. Cheek-To-Nose Interpolation Flap Text: A decision was made to reconstruct the defect utilizing an interpolation axial flap and a staged reconstruction.  A telfa template was made of the defect.  This telfa template was then used to outline the Cheek-To-Nose Interpolation flap.  The donor area for the pedicle flap was then injected with anesthesia.  The flap was excised through the skin and subcutaneous tissue down to the layer of the underlying musculature.  The interpolation flap was carefully excised within this deep plane to maintain its blood supply.  The edges of the donor site were undermined.   The donor site was closed in a primary fashion.  The pedicle was then rotated into position and sutured.  Once the tube was sutured into place, adequate blood supply was confirmed with blanching and refill.  The pedicle was then wrapped with xeroform gauze and dressed appropriately with a telfa and gauze bandage to ensure continued blood supply and protect the attached pedicle. Estlander Flap (Lower To Upper Lip) Text: The defect of the lower lip was assessed and measured.  Given the location and size of the defect, an Estlander flap was deemed most appropriate.  Using a sterile surgical marker, an appropriate Estlander flap was measured and drawn on the upper lip. Local anesthesia was then infiltrated. A scalpel was then used to incise the lateral aspect of the flap, through skin, muscle and mucosa, leaving the flap pedicled medially.  The flap was then rotated and positioned to fill the lower lip defect.  The flap was then sutured into place with a three layer technique, closing the orbicularis oris muscle layer with subcutaneous buried sutures, followed by a mucosal layer and an epidermal layer. Interpolation Flap Text: A decision was made to reconstruct the defect utilizing an interpolation axial flap and a staged reconstruction.  A telfa template was made of the defect.  This telfa template was then used to outline the interpolation flap.  The donor area for the pedicle flap was then injected with anesthesia.  The flap was excised through the skin and subcutaneous tissue down to the layer of the underlying musculature.  The interpolation flap was carefully excised within this deep plane to maintain its blood supply.  The edges of the donor site were undermined.   The donor site was closed in a primary fashion.  The pedicle was then rotated into position and sutured.  Once the tube was sutured into place, adequate blood supply was confirmed with blanching and refill.  The pedicle was then wrapped with xeroform gauze and dressed appropriately with a telfa and gauze bandage to ensure continued blood supply and protect the attached pedicle. Melolabial Interpolation Flap Text: A decision was made to reconstruct the defect utilizing an interpolation axial flap and a staged reconstruction.  A telfa template was made of the defect.  This telfa template was then used to outline the melolabial interpolation flap.  The donor area for the pedicle flap was then injected with anesthesia.  The flap was excised through the skin and subcutaneous tissue down to the layer of the underlying musculature.  The pedicle flap was carefully excised within this deep plane to maintain its blood supply.  The edges of the donor site were undermined.   The donor site was closed in a primary fashion.  The pedicle was then rotated into position and sutured.  Once the tube was sutured into place, adequate blood supply was confirmed with blanching and refill.  The pedicle was then wrapped with xeroform gauze and dressed appropriately with a telfa and gauze bandage to ensure continued blood supply and protect the attached pedicle. Mastoid Interpolation Flap Text: A decision was made to reconstruct the defect utilizing an interpolation axial flap and a staged reconstruction.  A telfa template was made of the defect.  This telfa template was then used to outline the mastoid interpolation flap.  The donor area for the pedicle flap was then injected with anesthesia.  The flap was excised through the skin and subcutaneous tissue down to the layer of the underlying musculature.  The pedicle flap was carefully excised within this deep plane to maintain its blood supply.  The edges of the donor site were undermined.   The donor site was closed in a primary fashion.  The pedicle was then rotated into position and sutured.  Once the tube was sutured into place, adequate blood supply was confirmed with blanching and refill.  The pedicle was then wrapped with xeroform gauze and dressed appropriately with a telfa and gauze bandage to ensure continued blood supply and protect the attached pedicle. Paramedian Forehead Flap Text: A decision was made to reconstruct the defect utilizing an interpolation axial flap and a staged reconstruction.  A telfa template was made of the defect.  This telfa template was then used to outline the paramedian forehead pedicle flap.  The donor area for the pedicle flap was then injected with anesthesia.  The flap was excised through the skin and subcutaneous tissue down to the layer of the underlying musculature.  The pedicle flap was carefully excised within this deep plane to maintain its blood supply.  The edges of the donor site were undermined.   The donor site was closed in a primary fashion.  The pedicle was then rotated into position and sutured.  Once the tube was sutured into place, adequate blood supply was confirmed with blanching and refill.  The pedicle was then wrapped with xeroform gauze and dressed appropriately with a telfa and gauze bandage to ensure continued blood supply and protect the attached pedicle. Posterior Auricular Interpolation Flap Text: A decision was made to reconstruct the defect utilizing an interpolation axial flap and a staged reconstruction.  A telfa template was made of the defect.  This telfa template was then used to outline the posterior auricular interpolation flap.  The donor area for the pedicle flap was then injected with anesthesia.  The flap was excised through the skin and subcutaneous tissue down to the layer of the underlying musculature.  The pedicle flap was carefully excised within this deep plane to maintain its blood supply.  The edges of the donor site were undermined.   The donor site was closed in a primary fashion.  The pedicle was then rotated into position and sutured.  Once the tube was sutured into place, adequate blood supply was confirmed with blanching and refill.  The pedicle was then wrapped with xeroform gauze and dressed appropriately with a telfa and gauze bandage to ensure continued blood supply and protect the attached pedicle. Cheiloplasty (Complex) Text: A decision was made to reconstruct the defect with a  cheiloplasty.  The defect was undermined extensively.  Additional orbicularis oris muscle was excised with a 15 blade scalpel.  The defect was converted into a full thickness wedge to facilite a better cosmetic result.  Small vessels were then tied off with 5-0 monocyrl. The orbicularis oris, superficial fascia, adipose and dermis were then reapproximated.  After the deeper layers were approximated the epidermis was reapproximated with particular care given to realign the vermilion border. Cheiloplasty (Less Than 50%) Text: A decision was made to reconstruct the defect with a  cheiloplasty.  The defect was undermined extensively.  Additional orbicularis oris muscle was excised with a 15 blade scalpel.  The defect was converted into a full thickness wedge, of less than 50% of the vertical height of the lip, to facilite a better cosmetic result.  Small vessels were then tied off with 5-0 monocyrl. The orbicularis oris, superficial fascia, adipose and dermis were then reapproximated.  After the deeper layers were approximated the epidermis was reapproximated with particular care given to realign the vermilion border. Ear Wedge Repair Text: A wedge excision was completed by carrying down an excision through the full thickness of the ear and cartilage with an inward facing Burow's triangle. The wound was then closed in a layered fashion. Full Thickness Lip Wedge Repair (Flap) Text: Given the location of the defect and the proximity to free margins a full thickness wedge repair was deemed most appropriate.  Using a sterile surgical marker, the appropriate repair was drawn incorporating the defect and placing the expected incisions perpendicular to the vermilion border.  The vermilion border was also meticulously outlined to ensure appropriate reapproximation during the repair.  The area thus outlined was incised through and through with a #15 scalpel blade.  The muscularis and dermis were reaproximated with deep sutures following hemostasis. Care was taken to realign the vermilion border before proceeding with the superficial closure.  Once the vermilion was realigned the superfical and mucosal closure was finished. Burow's Graft Text: The defect edges were debeveled with a #15 scalpel blade.  Given the location of the defect, shape of the defect, the proximity to free margins and the presence of a standing cone deformity a Burow's skin graft was deemed most appropriate. The standing cone was removed and this tissue was then trimmed to the shape of the primary defect. The adipose tissue was also removed until only dermis and epidermis were left.  The skin margins of the secondary defect were undermined to an appropriate distance in all directions utilizing iris scissors.  The secondary defect was closed with interrupted buried subcutaneous sutures.  The skin edges were then re-apposed with running  sutures.  The skin graft was then placed in the primary defect and oriented appropriately. Cartilage Graft Text: The defect edges were debeveled with a #15 scalpel blade.  Given the location of the defect, shape of the defect, the fact the defect involved a full thickness cartilage defect a cartilage graft was deemed most appropriate.  An appropriate donor site was identified, cleansed, and anesthetized. The cartilage graft was then harvested and transferred to the recipient site, oriented appropriately and then sutured into place.  The secondary defect was then repaired using a primary closure. Composite Graft Text: The defect edges were debeveled with a #15 scalpel blade.  Given the location of the defect, shape of the defect, the proximity to free margins and the fact the defect was full thickness a composite graft was deemed most appropriate.  The defect was outline and then transferred to the donor site.  A full thickness graft was then excised from the donor site. The graft was then placed in the primary defect, oriented appropriately and then sutured into place.  The secondary defect was then repaired using a primary closure. Epidermal Autograft Text: The defect edges were debeveled with a #15 scalpel blade.  Given the location of the defect, shape of the defect and the proximity to free margins an epidermal autograft was deemed most appropriate.  Using a sterile surgical marker, the primary defect shape was transferred to the donor site. The epidermal graft was then harvested.  The skin graft was then placed in the primary defect and oriented appropriately. Dermal Autograft Text: The defect edges were debeveled with a #15 scalpel blade.  Given the location of the defect, shape of the defect and the proximity to free margins a dermal autograft was deemed most appropriate.  Using a sterile surgical marker, the primary defect shape was transferred to the donor site. The area thus outlined was incised deep to adipose tissue with a #15 scalpel blade.  The harvested graft was then trimmed of adipose and epidermal tissue until only dermis was left.  The skin graft was then placed in the primary defect and oriented appropriately. Ftsg Text: The defect edges were debeveled with a #15 scalpel blade.  Given the location of the defect, shape of the defect and the proximity to free margins a full thickness skin graft was deemed most appropriate.  Using a sterile surgical marker, the primary defect shape was transferred to the donor site. The area thus outlined was incised deep to adipose tissue with a #15 scalpel blade.  The harvested graft was then trimmed of adipose tissue until only dermis and epidermis was left.  The skin margins of the secondary defect were undermined to an appropriate distance in all directions utilizing iris scissors.  The secondary defect was closed with interrupted buried subcutaneous sutures.  The skin edges were then re-apposed with running  sutures.  The skin graft was then placed in the primary defect and oriented appropriately. Pinch Graft Text: The defect edges were debeveled with a #15 scalpel blade. Given the location of the defect, shape of the defect and the proximity to free margins a pinch graft was deemed most appropriate. Using a sterile surgical marker, the primary defect shape was transferred to the donor site. The area thus outlined was incised deep to adipose tissue with a #15 scalpel blade.  The harvested graft was then trimmed of adipose tissue until only dermis and epidermis was left. The skin graft was then placed in the primary defect and oriented appropriately. Skin Substitute Text: The defect edges were debeveled with a #15 scalpel blade.  Given the location of the defect, shape of the defect and the proximity to free margins a skin substitute graft was deemed most appropriate.  The graft material was trimmed to fit the size of the defect. The graft was then placed in the primary defect and oriented appropriately. Split-Thickness Skin Graft Text: The defect edges were debeveled with a #15 scalpel blade.  Given the location of the defect, shape of the defect and the proximity to free margins a split thickness skin graft was deemed most appropriate.  Using a sterile surgical marker, the primary defect shape was transferred to the donor site. The split thickness graft was then harvested.  The skin graft was then placed in the primary defect and oriented appropriately. Tissue Cultured Epidermal Autograft Text: The defect edges were debeveled with a #15 scalpel blade.  Given the location of the defect, shape of the defect and the proximity to free margins a tissue cultured epidermal autograft was deemed most appropriate.  The graft was then trimmed to fit the size of the defect.  The graft was then placed in the primary defect and oriented appropriately. Xenograft Text: The defect edges were debeveled with a #15 scalpel blade.  Given the location of the defect, shape of the defect and the proximity to free margins a xenograft was deemed most appropriate.  The graft was then trimmed to fit the size of the defect.  The graft was then placed in the primary defect and oriented appropriately. Complex Repair And Flap Additional Text (Will Appearing After The Standard Complex Repair Text): The complex repair was not sufficient to completely close the primary defect. The remaining additional defect was repaired with the flap mentioned below. Complex Repair And Graft Additional Text (Will Appearing After The Standard Complex Repair Text): The complex repair was not sufficient to completely close the primary defect. The remaining additional defect was repaired with the graft mentioned below. Eyelid Full Thickness Repair - 17010: The eyelid defect was full thickness which required a wedge repair of the eyelid. Special care was taken to ensure that the eyelid margin was realligned when placing sutures. Eyelid Partial Thickness Repair - [id number]: The eyelid defect was partial thickness which required a wedge repair of the eyelid. Special care was taken to ensure that the eyelid margin was realligned when placing sutures. Intermediate Repair And Flap Additional Text (Will Appearing After The Standard Complex Repair Text): The intermediate repair was not sufficient to completely close the primary defect. The remaining additional defect was repaired with the flap mentioned below. Intermediate Repair And Graft Additional Text (Will Appearing After The Standard Complex Repair Text): The intermediate repair was not sufficient to completely close the primary defect. The remaining additional defect was repaired with the graft mentioned below. Localized Dermabrasion With 15 Blade Text: The patient was draped in routine manner.  Localized dermabrasion using a 15 blade was performed in routine manner to papillary dermis. This spot dermabrasion is being performed to complete skin cancer reconstruction. It also will eliminate the other sun damaged precancerous cells that are known to be part of the regional effect of a lifetime's worth of sun exposure. This localized dermabrasion is therapeutic and should not be considered cosmetic in any regard. Localized Dermabrasion With Sand Papertext: The patient was draped in routine manner.  Localized dermabrasion using sterile sand paper was performed in routine manner to papillary dermis. This spot dermabrasion is being performed to complete skin cancer reconstruction. It also will eliminate the other sun damaged precancerous cells that are known to be part of the regional effect of a lifetime's worth of sun exposure. This localized dermabrasion is therapeutic and should not be considered cosmetic in any regard. Localized Dermabrasion With Wire Brush Text: The patient was draped in routine manner.  Localized dermabrasion using 3 x 17 mm wire brush was performed in routine manner to papillary dermis. This spot dermabrasion is being performed to complete skin cancer reconstruction. It also will eliminate the other sun damaged precancerous cells that are known to be part of the regional effect of a lifetime's worth of sun exposure. This localized dermabrasion is therapeutic and should not be considered cosmetic in any regard. Purse String (Simple) Text: Given the location of the defect and the characteristics of the surrounding skin a purse string closure was deemed most appropriate.  Undermining was performed circumferentially around the surgical defect.  A purse string suture was then placed and tightened. Purse String (Intermediate) Text: Given the location of the defect and the characteristics of the surrounding skin a purse string intermediate closure was deemed most appropriate.  Undermining was performed circumferentially around the surgical defect.  A purse string suture was then placed and tightened. Partial Purse String (Simple) Text: Given the location of the defect and the characteristics of the surrounding skin a simple purse string closure was deemed most appropriate.  Undermining was performed circumferentially around the surgical defect.  A purse string suture was then placed and tightened. Wound tension only allowed a partial closure of the circular defect. Partial Purse String (Intermediate) Text: Given the location of the defect and the characteristics of the surrounding skin an intermediate purse string closure was deemed most appropriate.  Undermining was performed circumferentially around the surgical defect.  A purse string suture was then placed and tightened. Wound tension only allowed a partial closure of the circular defect. Tarsorrhaphy Text: A tarsorrhaphy was performed using Frost sutures. Manual Repair Warning Statement: We plan on removing the manually selected variable below in favor of our much easier automatic structured text blocks found in the previous tab. We decided to do this to help make the flow better and give you the full power of structured data. Manual selection is never going to be ideal in our platform and I would encourage you to avoid using manual selection from this point on, especially since I will be sunsetting this feature. It is important that you do one of two things with the customized text below. First, you can save all of the text in a word file so you can have it for future reference. Second, transfer the text to the appropriate area in the Library tab. Lastly, if there is a flap or graft type which we do not have you need to let us know right away so I can add it in before the variable is hidden. No need to panic, we plan to give you roughly 6 months to make the change. Same Histology In Subsequent Stages Text: The pattern and morphology of the tumor is as described in the first stage. No Residual Tumor Seen Histology Text: There were no malignant cells seen in the sections examined. Inflammation Suggestive Of Cancer Camouflage Histology Text: There was a dense lymphocytic infiltrate which prevented adequate histologic evaluation of adjacent structures. Area Suspicious For Tumor Histology Text: Area suspicious for tumor. Follicular Atypia Histology Text: Follicular atypia noted. Incidental Superficial Basal Cell Carcinoma Histology Text: Incidentally noted area atypical keratinocytes present throughout the entire thickness of the epidermis. Incidental Squamous Cell Carcinoma In Situ Histology Text: Atypical keratinocytes noted in the basal layer of the epidermis. Bcc Histology Text: There were numerous aggregates of basaloid cells. Bcc Adenoid Histology Text: There were numerous aggregates of basaloid cells demonstrating an adenoid pattern. Bcc Infiltrative Histology Text: There were numerous aggregates of basaloid cells demonstrating an infiltrative pattern. Bcc Infundibulocystic Histology Text: There were numerous aggregates of basaloid cells demonstrating an infundibulocystic pattern. Bcc Keratotic Histology Text: There were numerous aggregates of basaloid cells with squamous differentiation. Bcc Micronodular Histology Text: There were numerous aggregates of basaloid cells demonstrating a micronodular pattern. Bcc  Morpheaform/Sclerosing Histology Text: There were numerous aggregates of thin strands of basaloid cells in a fibrotic stroma. Bcc  Nodular Histology Text: There were numerous aggregates of basaloid cells demonstrating a nodular pattern. Bcc  Nodulocystic Histology Text: There were numerous aggregates of basaloid cells demonstrating a nodular pattern with cystic spaces. Bcc Pigmented Histology Text: There were numerous aggregates of basaloid cells, some with prominent melanin. Bcc Superficial Histology Text: There were numerous small buds of basaloid cells extending from the epidermis. Bcc Superficial Pigmented Histology Text: There were numerous small buds of pigmented basaloid cells extending from the epidermis. Mixed Superficial And Nodular Bcc Histology Text: There were numerous aggregates of basaloid cells demonstrating a superificial and nodular pattern. Mixed Nodular And Infiltrative Bcc Histology Text: There were numerous aggregates of basaloid cells demonstrating a nodular and infiltrative pattern. Mixed Nodular And Micronodular Bcc Histology Text: There were numerous aggregates of basaloid cells demonstrating a nodular and micronodular pattern. Metatypical Bcc Histology Text: There were numerous aggregates of basaloid cells, some demonstrating squamous differentiation. Fibroepithelioma Of Pinkus Histology Text: Net-like pattern of thin anastamosing basaloid cells in loose stroma. Scc Histology Text: Atypical keratinocytes with abundant pink cytoplasm extending into the dermis. Scc Well Differentiated Histology Text: Well differentiated atypical keratinocytes extending into the dermis. Scc Moderately Differentiated Histology Text: Moderately differentiated atypical keratinocytes extending into the dermis. Scc Poorly Differentiated Histology Text: Poorly differentiated atypical keratinocytes extending into the dermis. Scc Acantholytic Histology Text: Atypical keratinocytes extending into the dermis, demonstrating an acantholytic pattern. Scc Pigmented Histology Text: Atypical keratinocytes extending into the dermis, some of which are pigmented. Scc Desmoplastic Subtype Histology Text: Atypical keratinocytes extending into the dermis. Scc Spindle Histology Text: Atypical spindled cells extending into the dermis. Scc In Situ Histology Text: Atypical keratinocytes present throughout the entire thickness of the epidermis. Scc In Situ With Follicular Extension Histology Text: Atypical keratinocytes are present throughout the entire thickness of the epidermis with follicular extension. Afx Histology Text: Dermal cellular proliferation of pleomorphic, bizarre, spindled, epitheliod cells with many atypical mitoses. Basosquamous Cell Carcinoma Histology Text: Aggregates of atypical basaloid cells in the dermis with areas of squamous differentiation. Dfsp Histology Text: Cellular proliferation of spindled fibroblasts demonstrating a storiform pattern in some areas. Desmoplastic Trichoepithelioma Histology Text: Basaloid tumor islands in a reticulated pattern with peripheral palasading of nuclei in a fibrotic stroma. Trichoepithelioma Histology Text: Basaloid tumor islands in a reticulated pattern with peripheral palasading of nuclei and loose stroma with many fibroblasts. Mart-1 - Positive Histology Text: MART-1 staining demonstrates areas of higher density and clustering of melanocytes with Pagetoid spread upwards within the epidermis. The surgical margins are positive for tumor cells. Mart-1 - Negative Histology Text: MART-1 staining demonstrates a normal density and pattern of melanocytes along the dermal-epidermal junction. The surgical margins are negative for tumor cells. Information: Selecting Yes will display possible errors in your note based on the variables you have selected. This validation is only offered as a suggestion for you. PLEASE NOTE THAT THE VALIDATION TEXT WILL BE REMOVED WHEN YOU FINALIZE YOUR NOTE. IF YOU WANT TO FAX A PRELIMINARY NOTE YOU WILL NEED TO TOGGLE THIS TO 'NO' IF YOU DO NOT WANT IT IN YOUR FAXED NOTE. Bill 59 Modifier?: No - Continue to Bill 79 Modifier

## 2025-05-27 ENCOUNTER — TELEPHONE (OUTPATIENT)
Dept: INTERNAL MEDICINE CLINIC | Age: 75
End: 2025-05-27

## 2025-05-27 DIAGNOSIS — M51.369 DDD (DEGENERATIVE DISC DISEASE), LUMBAR: ICD-10-CM

## 2025-05-27 DIAGNOSIS — G89.4 CHRONIC PAIN DISORDER: ICD-10-CM

## 2025-05-27 RX ORDER — OXYCODONE AND ACETAMINOPHEN 5; 325 MG/1; MG/1
.5-1 TABLET ORAL EVERY 8 HOURS PRN
Qty: 90 TABLET | Refills: 0 | Status: SHIPPED | OUTPATIENT
Start: 2025-05-27 | End: 2025-06-26

## 2025-05-27 NOTE — TELEPHONE ENCOUNTER
Pt calling requesting refill of oxyCODONE-acetaminophen (PERCOCET) 5-325 MG per tablet     Last written 4/28/25  Last OV 4/29/25  Next OV 6/24/25    [facility] [address] [facility] [place].

## 2025-06-05 DIAGNOSIS — E78.5 HYPERLIPIDEMIA LDL GOAL <100: ICD-10-CM

## 2025-06-05 RX ORDER — ROSUVASTATIN CALCIUM 20 MG/1
20 TABLET, COATED ORAL DAILY
Qty: 90 TABLET | Refills: 1 | Status: SHIPPED | OUTPATIENT
Start: 2025-06-05

## 2025-06-18 RX ORDER — PREDNISONE 5 MG/1
5 TABLET ORAL DAILY
Qty: 90 TABLET | Refills: 1 | Status: SHIPPED | OUTPATIENT
Start: 2025-06-18

## 2025-06-24 ENCOUNTER — OFFICE VISIT (OUTPATIENT)
Dept: INTERNAL MEDICINE CLINIC | Age: 75
End: 2025-06-24

## 2025-06-24 VITALS
BODY MASS INDEX: 26.48 KG/M2 | HEIGHT: 66 IN | HEART RATE: 64 BPM | OXYGEN SATURATION: 97 % | DIASTOLIC BLOOD PRESSURE: 62 MMHG | SYSTOLIC BLOOD PRESSURE: 138 MMHG | WEIGHT: 164.8 LBS

## 2025-06-24 DIAGNOSIS — E78.5 HYPERLIPIDEMIA LDL GOAL <70: ICD-10-CM

## 2025-06-24 DIAGNOSIS — E55.9 VITAMIN D DEFICIENCY: ICD-10-CM

## 2025-06-24 DIAGNOSIS — N18.32 STAGE 3B CHRONIC KIDNEY DISEASE (HCC): ICD-10-CM

## 2025-06-24 DIAGNOSIS — F11.20 OPIOID DEPENDENCE WITH CURRENT USE (HCC): ICD-10-CM

## 2025-06-24 DIAGNOSIS — Z79.899 MEDICATION MANAGEMENT: ICD-10-CM

## 2025-06-24 DIAGNOSIS — E11.22 TYPE 2 DIABETES MELLITUS WITH STAGE 3B CHRONIC KIDNEY DISEASE, WITHOUT LONG-TERM CURRENT USE OF INSULIN (HCC): ICD-10-CM

## 2025-06-24 DIAGNOSIS — F02.A0 MILD LATE ONSET ALZHEIMER'S DEMENTIA WITHOUT BEHAVIORAL DISTURBANCE, PSYCHOTIC DISTURBANCE, MOOD DISTURBANCE, OR ANXIETY (HCC): ICD-10-CM

## 2025-06-24 DIAGNOSIS — N18.32 TYPE 2 DIABETES MELLITUS WITH STAGE 3B CHRONIC KIDNEY DISEASE, WITHOUT LONG-TERM CURRENT USE OF INSULIN (HCC): ICD-10-CM

## 2025-06-24 DIAGNOSIS — G89.4 CHRONIC PAIN DISORDER: ICD-10-CM

## 2025-06-24 DIAGNOSIS — I10 ESSENTIAL HYPERTENSION: Primary | ICD-10-CM

## 2025-06-24 DIAGNOSIS — G30.1 MILD LATE ONSET ALZHEIMER'S DEMENTIA WITHOUT BEHAVIORAL DISTURBANCE, PSYCHOTIC DISTURBANCE, MOOD DISTURBANCE, OR ANXIETY (HCC): ICD-10-CM

## 2025-06-24 ASSESSMENT — ENCOUNTER SYMPTOMS
DIARRHEA: 0
SHORTNESS OF BREATH: 0
CONSTIPATION: 0
CHEST TIGHTNESS: 0

## 2025-06-24 NOTE — PROGRESS NOTES
Secondary to hypertension and NSAID use.  No longer using NSAIDs.  Does follow with Dr. Brown for nephrology.  Check CMP.         Relevant Orders    CBC with Auto Differential    Vitamin D 25 Hydroxy    Albumin/Creatinine Ratio, Urine    Type 2 diabetes mellitus with kidney complication, without long-term current use of insulin (HCC)     Continue Jardiance 25 mg daily.  Continue working on diet.  Has lost 6 pounds since her appointment in April.         Relevant Orders    Hemoglobin A1C    Comprehensive Metabolic Panel    Albumin/Creatinine Ratio, Urine    Vitamin D deficiency    Continue weekly ergocalciferol.  Check vitamin D level.          Other Visit Diagnoses         Medication management        Relevant Orders    Vitamin B12 & Folate    Magnesium            Current Outpatient Medications   Medication Sig Dispense Refill    predniSONE (DELTASONE) 5 MG tablet TAKE 1 TABLET DAILY 90 tablet 1    rosuvastatin (CRESTOR) 20 MG tablet TAKE 1 TABLET DAILY 90 tablet 1    oxyCODONE-acetaminophen (PERCOCET) 5-325 MG per tablet Take 0.5-1 tablets by mouth every 8 hours as needed for Pain for up to 30 days. 90 tablet 0    olmesartan (BENICAR) 40 MG tablet Take 1 tablet by mouth daily 90 tablet 3    donepezil (ARICEPT) 10 MG tablet Take 1 tablet by mouth nightly      metoprolol succinate (TOPROL XL) 25 MG extended release tablet TAKE 1 TABLET TWICE A  tablet 3    lamoTRIgine (LAMICTAL) 100 MG tablet TAKE 1 TABLET TWICE A  tablet 3    venlafaxine (EFFEXOR XR) 150 MG extended release capsule TAKE 1 CAPSULE DAILY 90 capsule 1    amLODIPine (NORVASC) 10 MG tablet TAKE 1 TABLET DAILY 90 tablet 1    JARDIANCE 25 MG tablet Take 1 tablet by mouth daily 90 tablet 3    omeprazole (PRILOSEC) 20 MG delayed release capsule Take 1 capsule by mouth 2 times daily as needed (nauease and GI upset) = 180 capsule 3    Manganese 50 MG TABS Take 50 mg by mouth nightly      blood glucose monitor kit and supplies Check sugars qd and

## 2025-06-24 NOTE — PATIENT INSTRUCTIONS
Change donepezil to bedtime.  - if you are still having side effects, please let your neurologist know.     BP meds:  Metoprolol ER 25 mg 1 pill twice daily  Olmesartan 40 mg 1 pill once daily.  Amlodipine 10 mg 1 pill once daily.     Please have your labs drawn about 1 week prior to your next appointment in September . You may get your labs drawn in our office, Monday-Friday from 7:30 am-4:00 pm. You do not need an appointment. If this does not work for you, you may also have your labs drawn at [facility] earlier during the week or on weekends. If you prefer to have your labs draw elsewhere ([facility], [facility]), please allow a little more time for me to get your results and please call the office ahead of your appointment so that we may make sure that we have your labs at the time of your appointment. We sometimes need to call the lab directly (when not done at a  [facility]) to get your results.   Your labs are fasting.

## 2025-06-27 NOTE — ASSESSMENT & PLAN NOTE
Improved.  Continue olmesartan 40 mg daily, metoprolol ER 25 mg twice daily and amlodipine 10 mg daily.

## 2025-06-27 NOTE — ASSESSMENT & PLAN NOTE
Has noticed memory problems dating back to [id number] with her hospitalization for COVID and possible seizure.  Patient did undergo neuropsych testing in 2022 that was reportedly normal.  Recently established with neurology at .  Tau protein was elevated and functional PET scan showed decreased cerebral activity.  Diagnosed with late onset mild Alzheimer's dementia at her appointment in April.  Currently on Aricept 5 mg daily.  Discussed diagnosis with patient.  She does continue to function to her highest ability.  Support provided.

## 2025-06-27 NOTE — ASSESSMENT & PLAN NOTE
Bedside shift change report given to Mabel Sanchez RN (oncoming nurse) by Marine Klinefelter, RN (offgoing nurse). Report included the following information SBAR, Kardex, MAR, Accordion, Recent Results, Med Rec Status and Cardiac Rhythm NSR.      Hourly rounding was completed throughout this shift Secondary to hypertension and NSAID use.  No longer using NSAIDs.  Does follow with Dr. Brown for nephrology.  Check CMP.

## 2025-06-27 NOTE — ASSESSMENT & PLAN NOTE
Patient tried to avoid use of narcotics for many years but did not get relief with nonnarcotic modalities and is not able to take NSAIDs due to chronic kidney disease.  Patient does not feel that she is addicted to Percocet and that is a big concern for her as her son does struggle with addiction.  Patient's  helps keep track of pain medicines for her and her medications are locked up safely in and out of reach from their son.  Remains on Percocet 5/325 mg 3 times daily as needed for pain.    Controlled Substance Monitoring:    Acute and Chronic Pain Monitoring:   RX Monitoring Periodic Controlled Substance Monitoring   6/24/2025  12:33 PM Possible medication side effects, risk of tolerance/dependence & alternative treatments discussed.;No signs of potential drug abuse or diversion identified.;Assessed functional status (ability to engage in work or other purposeful activities, the pain intensity and its interference with activities of daily living, quality of family life and social activities, and the physical activity);Obtaining appropriate analgesic effect of treatment.

## 2025-06-27 NOTE — ASSESSMENT & PLAN NOTE
Continue Percocet 5/325 mg to 3 times daily as needed.  Unable to take NSAIDs due to chronic kidney disease.  Gabapentin was discontinued in March 2024 due to ineffectiveness.

## 2025-06-27 NOTE — ASSESSMENT & PLAN NOTE
Continue Jardiance 25 mg daily.  Continue working on diet.  Has lost 6 pounds since her appointment in April.

## 2025-07-03 RX ORDER — METHOCARBAMOL 750 MG/1
TABLET, FILM COATED ORAL
Qty: 90 TABLET | Refills: 2 | OUTPATIENT
Start: 2025-07-03

## 2025-07-08 DIAGNOSIS — M51.369 DDD (DEGENERATIVE DISC DISEASE), LUMBAR: ICD-10-CM

## 2025-07-08 DIAGNOSIS — G89.4 CHRONIC PAIN DISORDER: ICD-10-CM

## 2025-07-08 RX ORDER — OXYCODONE AND ACETAMINOPHEN 5; 325 MG/1; MG/1
.5-1 TABLET ORAL EVERY 8 HOURS PRN
Qty: 90 TABLET | Refills: 0 | Status: SHIPPED | OUTPATIENT
Start: 2025-07-08 | End: 2025-08-07

## 2025-07-08 NOTE — TELEPHONE ENCOUNTER
Medication: Oxycodone    Last Filled: 5/27/25    Pharmacy: [facility] [place]    Last appt: 6/24/2025   Next appt: 9/26/2025    Last OARRS:       6/24/2025    12:33 PM   RX Monitoring   Periodic Controlled Substance Monitoring Possible medication side effects, risk of tolerance/dependence & alternative treatments discussed.;No signs of potential drug abuse or diversion identified.;Assessed functional status (ability to engage in work or other purposeful activities, the pain intensity and its interference with activities of daily living, quality of family life and social activities, and the physical activity);Obtaining appropriate analgesic effect of treatment.

## 2025-08-04 ENCOUNTER — TELEPHONE (OUTPATIENT)
Dept: INTERNAL MEDICINE CLINIC | Age: 75
End: 2025-08-04

## 2025-08-04 RX ORDER — EMPAGLIFLOZIN 25 MG/1
25 TABLET, FILM COATED ORAL DAILY
Qty: 90 TABLET | Refills: 3 | Status: SHIPPED | OUTPATIENT
Start: 2025-08-04

## 2025-08-04 RX ORDER — AMLODIPINE BESYLATE 10 MG/1
10 TABLET ORAL DAILY
Qty: 90 TABLET | Refills: 1 | Status: SHIPPED | OUTPATIENT
Start: 2025-08-04

## 2025-08-18 DIAGNOSIS — M51.369 DDD (DEGENERATIVE DISC DISEASE), LUMBAR: ICD-10-CM

## 2025-08-18 DIAGNOSIS — G89.4 CHRONIC PAIN DISORDER: ICD-10-CM

## 2025-08-18 RX ORDER — OXYCODONE AND ACETAMINOPHEN 5; 325 MG/1; MG/1
.5-1 TABLET ORAL EVERY 8 HOURS PRN
Qty: 90 TABLET | Refills: 0 | Status: SHIPPED | OUTPATIENT
Start: 2025-08-18 | End: 2025-09-17